# Patient Record
Sex: MALE | Race: WHITE | NOT HISPANIC OR LATINO | Employment: OTHER | URBAN - METROPOLITAN AREA
[De-identification: names, ages, dates, MRNs, and addresses within clinical notes are randomized per-mention and may not be internally consistent; named-entity substitution may affect disease eponyms.]

---

## 2017-05-23 ENCOUNTER — HOSPITAL ENCOUNTER (OUTPATIENT)
Dept: LAB | Facility: HOSPITAL | Age: 67
Setting detail: SPECIMEN
Discharge: HOME | End: 2017-05-23
Attending: INTERNAL MEDICINE | Admitting: INTERNAL MEDICINE

## 2017-11-20 ENCOUNTER — HOSPITAL ENCOUNTER (OUTPATIENT)
Dept: LAB | Facility: HOSPITAL | Age: 67
Setting detail: SPECIMEN
Discharge: HOME | End: 2017-11-20
Attending: INTERNAL MEDICINE | Admitting: INTERNAL MEDICINE

## 2017-11-20 LAB
ALBUMIN SERPL-MCNC: 3.9 G/DL (ref 3.5–4.8)
ALBUMIN/GLOB SERPL: 1.6 {RATIO} (ref 1–1.7)
ALP SERPL-CCNC: 97 IU/L (ref 32–91)
ALT SERPL-CCNC: 24 IU/L (ref 17–63)
ANION GAP SERPL CALC-SCNC: 12.7 MMOL/L (ref 10–20)
AST SERPL-CCNC: 17 IU/L (ref 15–41)
BILIRUB SERPL-MCNC: 0.7 MG/DL (ref 0.3–1.2)
BUN SERPL-MCNC: 19 MG/DL (ref 8–20)
BUN/CREAT SERPL: 23.8 (ref 6.2–20.3)
CALCIUM SERPL-MCNC: 9.5 MG/DL (ref 8.9–10.3)
CHLORIDE SERPL-SCNC: 101 MMOL/L (ref 101–111)
CHOLEST SERPL-MCNC: 125 MG/DL
CHOLEST/HDLC SERPL: 3.3 {RATIO}
CONV CO2: 27 MMOL/L (ref 22–32)
CONV LDL CHOLESTEROL DIRECT: 67 MG/DL (ref 0–100)
CONV MICROALBUM.,U,RANDOM: 161 MG/L
CONV TOTAL PROTEIN: 6.4 G/DL (ref 6.1–7.9)
CREAT 24H UR-MCNC: 94 MG/DL
CREAT UR-MCNC: 0.8 MG/DL (ref 0.7–1.2)
GLOBULIN UR ELPH-MCNC: 2.5 G/DL (ref 2.5–3.8)
GLUCOSE SERPL-MCNC: 260 MG/DL (ref 65–99)
HDLC SERPL-MCNC: 38 MG/DL
LDLC/HDLC SERPL: 1.8 {RATIO}
LIPID INTERPRETATION: ABNORMAL
MICROALBUMIN/CREAT UR: 171.3 UG/MG
POTASSIUM SERPL-SCNC: 4.7 MMOL/L (ref 3.6–5.1)
SODIUM SERPL-SCNC: 136 MMOL/L (ref 136–144)
TRIGL SERPL-MCNC: 179 MG/DL
TSH SERPL-ACNC: 2.07 UIU/ML (ref 0.34–5.6)
VLDLC SERPL CALC-MCNC: 20 MG/DL

## 2018-01-29 ENCOUNTER — OFFICE (OUTPATIENT)
Dept: URBAN - METROPOLITAN AREA PATHOLOGY 4 | Facility: PATHOLOGY | Age: 68
End: 2018-01-29

## 2018-01-29 ENCOUNTER — ON CAMPUS - OUTPATIENT (OUTPATIENT)
Dept: URBAN - METROPOLITAN AREA HOSPITAL 2 | Facility: HOSPITAL | Age: 68
End: 2018-01-29

## 2018-01-29 ENCOUNTER — HOSPITAL ENCOUNTER (OUTPATIENT)
Dept: OTHER | Facility: HOSPITAL | Age: 68
Setting detail: SPECIMEN
Discharge: HOME | End: 2018-01-29
Attending: INTERNAL MEDICINE | Admitting: INTERNAL MEDICINE

## 2018-01-29 VITALS
OXYGEN SATURATION: 91 % | HEIGHT: 72 IN | HEART RATE: 76 BPM | DIASTOLIC BLOOD PRESSURE: 63 MMHG | DIASTOLIC BLOOD PRESSURE: 51 MMHG | SYSTOLIC BLOOD PRESSURE: 109 MMHG | DIASTOLIC BLOOD PRESSURE: 43 MMHG | DIASTOLIC BLOOD PRESSURE: 58 MMHG | RESPIRATION RATE: 20 BRPM | SYSTOLIC BLOOD PRESSURE: 131 MMHG | HEART RATE: 80 BPM | DIASTOLIC BLOOD PRESSURE: 93 MMHG | DIASTOLIC BLOOD PRESSURE: 75 MMHG | TEMPERATURE: 97.2 F | HEART RATE: 81 BPM | RESPIRATION RATE: 16 BRPM | DIASTOLIC BLOOD PRESSURE: 69 MMHG | DIASTOLIC BLOOD PRESSURE: 85 MMHG | HEART RATE: 88 BPM | SYSTOLIC BLOOD PRESSURE: 186 MMHG | SYSTOLIC BLOOD PRESSURE: 110 MMHG | SYSTOLIC BLOOD PRESSURE: 127 MMHG | HEART RATE: 82 BPM | HEART RATE: 87 BPM | OXYGEN SATURATION: 95 % | OXYGEN SATURATION: 99 % | SYSTOLIC BLOOD PRESSURE: 150 MMHG | OXYGEN SATURATION: 97 % | SYSTOLIC BLOOD PRESSURE: 126 MMHG | HEART RATE: 84 BPM | WEIGHT: 275.6 LBS | OXYGEN SATURATION: 96 % | SYSTOLIC BLOOD PRESSURE: 104 MMHG | DIASTOLIC BLOOD PRESSURE: 82 MMHG | RESPIRATION RATE: 18 BRPM | HEART RATE: 83 BPM | OXYGEN SATURATION: 98 % | SYSTOLIC BLOOD PRESSURE: 119 MMHG

## 2018-01-29 DIAGNOSIS — K62.1 RECTAL POLYP: ICD-10-CM

## 2018-01-29 DIAGNOSIS — D12.5 BENIGN NEOPLASM OF SIGMOID COLON: ICD-10-CM

## 2018-01-29 DIAGNOSIS — D12.4 BENIGN NEOPLASM OF DESCENDING COLON: ICD-10-CM

## 2018-01-29 DIAGNOSIS — Z86.010 PERSONAL HISTORY OF COLONIC POLYPS: ICD-10-CM

## 2018-01-29 DIAGNOSIS — D17.79 BENIGN LIPOMATOUS NEOPLASM OF OTHER SITES: ICD-10-CM

## 2018-01-29 DIAGNOSIS — K57.30 DIVERTICULOSIS OF LARGE INTESTINE WITHOUT PERFORATION OR ABS: ICD-10-CM

## 2018-01-29 DIAGNOSIS — D12.2 BENIGN NEOPLASM OF ASCENDING COLON: ICD-10-CM

## 2018-01-29 DIAGNOSIS — K63.5 POLYP OF COLON: ICD-10-CM

## 2018-01-29 LAB
GI HISTOLOGY: A. UNSPECIFIED: (no result)
GI HISTOLOGY: B. UNSPECIFIED: (no result)
GI HISTOLOGY: C. UNSPECIFIED: (no result)
GI HISTOLOGY: D. UNSPECIFIED: (no result)
GI HISTOLOGY: PDF REPORT: (no result)

## 2018-01-29 PROCEDURE — 45385 COLONOSCOPY W/LESION REMOVAL: CPT | Mod: PT | Performed by: INTERNAL MEDICINE

## 2018-01-29 PROCEDURE — 45380 COLONOSCOPY AND BIOPSY: CPT | Mod: 59,PT | Performed by: INTERNAL MEDICINE

## 2018-01-29 PROCEDURE — 88305 TISSUE EXAM BY PATHOLOGIST: CPT | Mod: 26 | Performed by: INTERNAL MEDICINE

## 2018-01-29 RX ADMIN — PROPOFOL: 10 INJECTION, EMULSION INTRAVENOUS at 10:59

## 2018-06-04 ENCOUNTER — HOSPITAL ENCOUNTER (OUTPATIENT)
Dept: LAB | Facility: HOSPITAL | Age: 68
Setting detail: SPECIMEN
Discharge: HOME | End: 2018-06-04
Attending: INTERNAL MEDICINE | Admitting: INTERNAL MEDICINE

## 2019-01-09 ENCOUNTER — HOSPITAL ENCOUNTER (OUTPATIENT)
Dept: LAB | Facility: HOSPITAL | Age: 69
Setting detail: SPECIMEN
Discharge: HOME | End: 2019-01-09
Attending: INTERNAL MEDICINE | Admitting: INTERNAL MEDICINE

## 2019-01-09 LAB
ALBUMIN SERPL-MCNC: 3.5 G/DL (ref 3.5–4.8)
ALBUMIN/GLOB SERPL: 1.4 {RATIO} (ref 1–1.7)
ALP SERPL-CCNC: 104 IU/L (ref 32–91)
ALT SERPL-CCNC: 30 IU/L (ref 17–63)
ANION GAP SERPL CALC-SCNC: 13.1 MMOL/L (ref 10–20)
AST SERPL-CCNC: 21 IU/L (ref 15–41)
BILIRUB SERPL-MCNC: 0.3 MG/DL (ref 0.3–1.2)
BUN SERPL-MCNC: 14 MG/DL (ref 8–20)
BUN/CREAT SERPL: 17.5 (ref 6.2–20.3)
CALCIUM SERPL-MCNC: 8.8 MG/DL (ref 8.9–10.3)
CHLORIDE SERPL-SCNC: 103 MMOL/L (ref 101–111)
CHOLEST SERPL-MCNC: 101 MG/DL
CHOLEST/HDLC SERPL: 3.6 {RATIO}
CONV CO2: 26 MMOL/L (ref 22–32)
CONV LDL CHOLESTEROL DIRECT: 60 MG/DL (ref 0–100)
CONV MICROALBUM.,U,RANDOM: 353 MG/L
CONV TOTAL PROTEIN: 6 G/DL (ref 6.1–7.9)
CREAT 24H UR-MCNC: 103 MG/DL
CREAT UR-MCNC: 0.8 MG/DL (ref 0.7–1.2)
GLOBULIN UR ELPH-MCNC: 2.5 G/DL (ref 2.5–3.8)
GLUCOSE SERPL-MCNC: 113 MG/DL (ref 65–99)
HDLC SERPL-MCNC: 28 MG/DL
LDLC/HDLC SERPL: 2.2 {RATIO}
LIPID INTERPRETATION: ABNORMAL
MICROALBUMIN/CREAT UR: 342.7 UG/MG
POTASSIUM SERPL-SCNC: 4.1 MMOL/L (ref 3.6–5.1)
SODIUM SERPL-SCNC: 138 MMOL/L (ref 136–144)
TRIGL SERPL-MCNC: 129 MG/DL
VLDLC SERPL CALC-MCNC: 12.9 MG/DL

## 2019-07-03 DIAGNOSIS — E10.49 TYPE 1 DIABETES MELLITUS WITH OTHER DIABETIC NEUROLOGICAL COMPLICATION (HCC): Primary | ICD-10-CM

## 2019-07-03 DIAGNOSIS — E55.9 VITAMIN D DEFICIENCY: ICD-10-CM

## 2019-07-03 PROBLEM — Z80.1 FAMILY HISTORY OF MALIGNANT NEOPLASM OF TRACHEA, BRONCHUS AND LUNG: Status: ACTIVE | Noted: 2019-07-03

## 2019-07-16 ENCOUNTER — LAB (OUTPATIENT)
Dept: LAB | Facility: HOSPITAL | Age: 69
End: 2019-07-16

## 2019-07-16 DIAGNOSIS — E10.49 TYPE 1 DIABETES MELLITUS WITH OTHER DIABETIC NEUROLOGICAL COMPLICATION (HCC): ICD-10-CM

## 2019-07-16 DIAGNOSIS — E55.9 VITAMIN D DEFICIENCY: ICD-10-CM

## 2019-07-16 LAB
25(OH)D3 SERPL-MCNC: 30.4 NG/ML (ref 30–100)
HBA1C MFR BLD: 7.4 % (ref 3.5–5.6)

## 2019-07-16 PROCEDURE — 82306 VITAMIN D 25 HYDROXY: CPT | Performed by: NURSE PRACTITIONER

## 2019-07-16 PROCEDURE — 36415 COLL VENOUS BLD VENIPUNCTURE: CPT

## 2019-07-16 PROCEDURE — 83036 HEMOGLOBIN GLYCOSYLATED A1C: CPT | Performed by: NURSE PRACTITIONER

## 2019-07-23 ENCOUNTER — OFFICE VISIT (OUTPATIENT)
Dept: ENDOCRINOLOGY | Facility: CLINIC | Age: 69
End: 2019-07-23

## 2019-07-23 VITALS
BODY MASS INDEX: 36.84 KG/M2 | HEART RATE: 74 BPM | WEIGHT: 272 LBS | DIASTOLIC BLOOD PRESSURE: 78 MMHG | HEIGHT: 72 IN | SYSTOLIC BLOOD PRESSURE: 142 MMHG | OXYGEN SATURATION: 98 %

## 2019-07-23 DIAGNOSIS — E10.49 TYPE 1 DIABETES MELLITUS WITH OTHER DIABETIC NEUROLOGICAL COMPLICATION (HCC): Primary | ICD-10-CM

## 2019-07-23 DIAGNOSIS — I10 ESSENTIAL HYPERTENSION: ICD-10-CM

## 2019-07-23 DIAGNOSIS — E78.5 HYPERLIPIDEMIA, UNSPECIFIED HYPERLIPIDEMIA TYPE: ICD-10-CM

## 2019-07-23 DIAGNOSIS — E55.9 VITAMIN D DEFICIENCY: ICD-10-CM

## 2019-07-23 LAB — GLUCOSE BLDC GLUCOMTR-MCNC: 169 MG/DL (ref 70–130)

## 2019-07-23 PROCEDURE — 82962 GLUCOSE BLOOD TEST: CPT | Performed by: INTERNAL MEDICINE

## 2019-07-23 PROCEDURE — 99214 OFFICE O/P EST MOD 30 MIN: CPT | Performed by: INTERNAL MEDICINE

## 2019-07-23 RX ORDER — ASCORBIC ACID 500 MG
500 TABLET ORAL DAILY
COMMUNITY

## 2019-07-23 RX ORDER — BLOOD SUGAR DIAGNOSTIC
1 STRIP MISCELLANEOUS 3 TIMES DAILY
Refills: 11 | COMMUNITY
Start: 2019-05-31 | End: 2023-03-20

## 2019-07-23 RX ORDER — BIMATOPROST 0.01 %
1 DROPS OPHTHALMIC (EYE) NIGHTLY
Refills: 2 | COMMUNITY
Start: 2019-07-08

## 2019-07-23 RX ORDER — ATORVASTATIN CALCIUM 20 MG/1
20 TABLET, FILM COATED ORAL
Refills: 3 | COMMUNITY
Start: 2019-07-06 | End: 2023-03-20

## 2019-07-23 RX ORDER — ASPIRIN 81 MG/1
81 TABLET ORAL DAILY
COMMUNITY
Start: 2015-11-16 | End: 2023-03-20

## 2019-07-23 RX ORDER — LISINOPRIL 20 MG/1
20 TABLET ORAL DAILY
Refills: 1 | COMMUNITY
Start: 2019-05-13 | End: 2019-07-23 | Stop reason: DRUGHIGH

## 2019-07-23 RX ORDER — MV-MN/FOLIC ACID/LUTEIN/HRB178 200-175MCG
1 TABLET ORAL DAILY
COMMUNITY
Start: 2015-11-16

## 2019-07-23 RX ORDER — PEN NEEDLE, DIABETIC 31 GX5/16"
NEEDLE, DISPOSABLE MISCELLANEOUS
Refills: 5 | COMMUNITY
Start: 2019-06-26 | End: 2023-03-20

## 2019-07-23 RX ORDER — LANCETS
EACH MISCELLANEOUS 3 TIMES DAILY
Refills: 10 | COMMUNITY
Start: 2019-04-22 | End: 2023-03-20

## 2019-07-23 RX ORDER — INSULIN GLARGINE 300 U/ML
80 INJECTION, SOLUTION SUBCUTANEOUS
Refills: 2 | COMMUNITY
Start: 2019-06-10 | End: 2020-02-26

## 2019-07-23 RX ORDER — LISINOPRIL 40 MG/1
40 TABLET ORAL DAILY
Qty: 90 TABLET | Refills: 4 | Status: SHIPPED | OUTPATIENT
Start: 2019-07-23 | End: 2020-07-22

## 2019-07-23 RX ORDER — INSULIN LISPRO 100 [IU]/ML
26 INJECTION, SOLUTION INTRAVENOUS; SUBCUTANEOUS
Refills: 2 | COMMUNITY
Start: 2019-05-24 | End: 2020-02-07

## 2019-07-23 NOTE — PATIENT INSTRUCTIONS
Increase Humalog to 26 units before meals.  Call if blood sugars are running under 100 or over 200.  Increase lisinopril to 2 daily until finished.  Then take 40 mg daily.  Continue exercise.  F/u in 6 months, with fasting labs prior.

## 2019-07-29 NOTE — PROGRESS NOTES
[facility]        Patient Care Team:  Neel Gusman MD as PCP - General  Neel Gusman MD as PCP - Claims Attributed    Chief Complaint:    Chief Complaint   Patient presents with   • Diabetes     type 1          Subjective   Here for diabetes f/u  Blood sugars up & down in am  Exercise program: golf  Had L eye laser this month  Interval History:     Patient Complaints: none  Patient Denies:  Severe hypoglycemia  History taken from: patient    Review of Systems:   Review of Systems   Eyes: Negative for blurred vision.   Gastrointestinal: Negative for nausea.   Endocrine: Negative for polyuria.   Neurological: Negative for headache.     Lost  3 lb since last visit    Objective     Vital Signs      Vitals:    07/23/19 0742   BP: 142/78   Pulse: 74   SpO2: 98%         Physical Exam:     General Appearance:    Alert, cooperative, in no acute distress. Obese   Head:    Normocephalic, without obvious abnormality, atraumatic   Eyes:            Lids and lashes normal, conjunctivae and sclerae normal, no   icterus, no pallor, corneas clear, PERRLA   Throat:   No oral lesions,  oral mucosa moist. Dentures   Neck:   No adenopathy, supple,  no thyromegaly, no   carotid bruit   Lungs:     Clear / decreased breath sounds    Heart:    Regular rhythm and normal rate   Chest Wall:    No abnormalities observed   Abdomen:     Normal bowel sounds, soft, obese                 Extremities:   Moves all extremities well, no edema               Pulses:   Pulses palpable and equal bilaterally   Skin:   Dry. No bruising or rash   Neurologic:  DTR absent, able to feel the 10g monofilament          Results Review:    I have reviewed the patient's new clinical results, labs & imaging.    Medication Review:   Prior to Admission medications    Medication Sig Start Date End Date Taking? Authorizing Provider   ACCU-CHEK HOLLI PLUS test strip 3 (Three) Times a Day. for testing three times daily breakfast lunch  and supper dx:e11.65 5/31/19  Yes ProviderDez MD   ACCU-CHEK SOFTCLIX LANCETS lancets 3 (Three) Times a Day. Breakfast lunch and supper testing dx:e11.65 4/22/19  Yes Dez Ambrose MD   aspirin (ADULT ASPIRIN EC LOW STRENGTH) 81 MG EC tablet Take one daily 11/16/15  Yes Dez Ambrose MD   atorvastatin (LIPITOR) 20 MG tablet Take 20 mg by mouth every night at bedtime. 7/6/19  Yes Dez Ambrose MD B-D ULTRAFINE III SHORT PEN 31G X 8 MM misc Use for injections four times daily 6/26/19  Yes Dez Ambrose MD   HUMALOG KWIKPEN 100 UNIT/ML solution pen-injector Inject 26 units ac breakfast, lunch, and supper with a sliding scale, max daily dose 100. Dx:e11.65 5/24/19  Yes ProviderDez MD   LUMIGAN 0.01 % ophthalmic drops Instill 1 drop into both eyes daily at bedtime 7/8/19  Yes ProviderDez MD   metFORMIN (GLUCOPHAGE) 1000 MG tablet Take 1,000 mg by mouth 2 (Two) Times a Day. 6/25/19  Yes ProviderDez MD   Multiple Vitamins-Minerals (GWENDOLYN MULTIVITAMIN FOR MEN) tablet Take one daily 11/16/15  Yes ProviderDez MD TOUJEO SOLOSTAR 300 UNIT/ML solution pen-injector inject 80 units at bedtime dx:e11.65 6/10/19  Yes ProviderDez MD   vitamin C (ASCORBIC ACID) 250 MG tablet Take 250 mg by mouth Daily.   Yes ProviderDez MD   lisinopril (PRINIVIL,ZESTRIL) 40 MG tablet Take 1 tablet by mouth Daily. 7/23/19 7/22/20  Julian Hall MD       Lab Results (most recent)     Procedure Component Value Units Date/Time    POC Glucose Fingerstick [[id number]]  (Abnormal) Collected:  07/23/19 0749    Specimen:  Blood Updated:  07/23/19 0749     Glucose 169 mg/dL      Comment: ate@ 630am this morning, 1.5 hours ago, insulin@ 6am               Lab Results   Component Value Date    HGBA1C 7.4 (H) 07/16/2019      Lab Results   Component Value Date    GLUCOSE 113 (H) 01/09/2019    BUN 14 01/09/2019    CREATININE 0.8 01/09/2019    BCR 17.5  01/09/2019    K 4.1 01/09/2019    CO2 26 01/09/2019    CALCIUM 8.8 (L) 01/09/2019    ALBUMIN 3.5 01/09/2019    LABIL2 1.4 01/09/2019    AST 21 01/09/2019    ALT 30 01/09/2019    CHOL 101 01/09/2019    LDL 60 01/09/2019    HDL 28 (L) 01/09/2019    TRIG 129 01/09/2019     Lab Results   Component Value Date    TSH 2.07 11/20/2017    PMTH39TW 30.4 07/16/2019       Assessment/Plan     Gerry was seen today for diabetes.    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        Increase Humalog to 26 units before meals.  Call if blood sugars are running under 100 or over 200.  Increase lisinopril to 2 daily until finished.  Then take 40 mg daily.  Continue exercise.          Julian Hall MD  07/29/19  9:55 AM         no

## 2019-10-16 ENCOUNTER — APPOINTMENT (OUTPATIENT)
Dept: PREADMISSION TESTING | Facility: HOSPITAL | Age: 69
End: 2019-10-16

## 2019-10-16 VITALS
DIASTOLIC BLOOD PRESSURE: 85 MMHG | BODY MASS INDEX: 37.25 KG/M2 | HEIGHT: 72 IN | HEART RATE: 66 BPM | OXYGEN SATURATION: 97 % | SYSTOLIC BLOOD PRESSURE: 178 MMHG | WEIGHT: 275 LBS

## 2019-10-16 RX ORDER — CHOLECALCIFEROL (VITAMIN D3) 125 MCG
1 CAPSULE ORAL DAILY
COMMUNITY

## 2019-10-16 RX ORDER — TAMSULOSIN HYDROCHLORIDE 0.4 MG/1
2 CAPSULE ORAL
COMMUNITY

## 2019-10-21 ENCOUNTER — ANESTHESIA EVENT (OUTPATIENT)
Dept: PERIOP | Facility: HOSPITAL | Age: 69
End: 2019-10-21

## 2019-10-21 NOTE — H&P
Patient Care Team:  Neel Gusman MD as PCP - General  Neel Gusman MD as PCP - Claims Attributed    Chief complaint Nonclearing diabetic vitreous hemorrhage    HPI Decreased vision from diabetic vitreous hemorrhage OD    Review of Systems  Review of Systems   Constitution: Negative.   HENT: Negative.    Eyes: Negative.    Cardiovascular: Negative.    Respiratory: Negative.    Endocrine: Negative.    Skin: Negative.    Musculoskeletal: Negative.    Gastrointestinal: Negative.    Genitourinary: Negative.    Neurological: Negative.    Psychiatric/Behavioral: Negative.    Allergic/Immunologic: Negative.    All other systems reviewed and are negative.      Physical Exam  Physical Exam   Constitutional: He is oriented to person, place, and time.   HENT:   Head: Normocephalic.   Cardiovascular: Normal rate, regular rhythm and normal heart sounds.   Pulmonary/Chest: Effort normal and breath sounds normal.   Neurological: He is alert and oriented to person, place, and time. He has normal strength.       No Known Allergies    History  Past Medical History:   Diagnosis Date   • Diabetes mellitus (CMS/HCC)    • Hyperlipidemia    • Hypertension        Past Surgical History:   Procedure Laterality Date   • HERNIA REPAIR     • KNEE ARTHROSCOPY Left    • TONSILLECTOMY         Social History     Tobacco Use   • Smoking status: Former Smoker     Last attempt to quit: 2018     Years since quittin.8   Substance Use Topics   • Alcohol use: Yes     Comment: occasionally   • Drug use: Yes     Types: Marijuana       No family history on file.    Vital Signs  There were no vitals taken for this visit.         Assessment:  (Diabetic vitreous hemorrhage OD).     Plan:   (Vitrectomy and endolaser OD).       Howard S. Lazarus, MD  10/21/19  3:13 PM

## 2019-10-22 ENCOUNTER — ANESTHESIA (OUTPATIENT)
Dept: PERIOP | Facility: HOSPITAL | Age: 69
End: 2019-10-22

## 2019-10-22 ENCOUNTER — HOSPITAL ENCOUNTER (OUTPATIENT)
Facility: HOSPITAL | Age: 69
Setting detail: HOSPITAL OUTPATIENT SURGERY
Discharge: HOME | End: 2019-10-22
Attending: OPHTHALMOLOGY | Admitting: OPHTHALMOLOGY

## 2019-10-22 VITALS
DIASTOLIC BLOOD PRESSURE: 74 MMHG | HEIGHT: 72 IN | HEART RATE: 82 BPM | RESPIRATION RATE: 16 BRPM | TEMPERATURE: 97.9 F | WEIGHT: 269.4 LBS | BODY MASS INDEX: 36.49 KG/M2 | SYSTOLIC BLOOD PRESSURE: 170 MMHG | OXYGEN SATURATION: 99 %

## 2019-10-22 LAB
GLUCOSE BLDC GLUCOMTR-MCNC: 200 MG/DL (ref 70–105)
GLUCOSE BLDC GLUCOMTR-MCNC: 254 MG/DL (ref 70–105)

## 2019-10-22 PROCEDURE — 25010000002 HYALURONIDASE (HUMAN) 150 UNIT/ML SOLUTION 1 ML VIAL: Performed by: OPHTHALMOLOGY

## 2019-10-22 PROCEDURE — 25010000003 CEFAZOLIN PER 500 MG: Performed by: OPHTHALMOLOGY

## 2019-10-22 PROCEDURE — 25010000002 HYDRALAZINE PER 20 MG: Performed by: NURSE ANESTHETIST, CERTIFIED REGISTERED

## 2019-10-22 PROCEDURE — 25010000002 DEXAMETHASONE PER 1 MG: Performed by: OPHTHALMOLOGY

## 2019-10-22 PROCEDURE — 25010000002 PROPOFOL 10 MG/ML EMULSION: Performed by: NURSE ANESTHETIST, CERTIFIED REGISTERED

## 2019-10-22 PROCEDURE — 63710000001 INSULIN REGULAR HUMAN PER 5 UNITS: Performed by: NURSE ANESTHETIST, CERTIFIED REGISTERED

## 2019-10-22 PROCEDURE — 82962 GLUCOSE BLOOD TEST: CPT

## 2019-10-22 RX ORDER — PHENYLEPHRINE HCL 2.5 %
1 DROPS OPHTHALMIC (EYE)
Status: COMPLETED | OUTPATIENT
Start: 2019-10-22 | End: 2019-10-22

## 2019-10-22 RX ORDER — CIPROFLOXACIN HYDROCHLORIDE 3.5 MG/ML
1 SOLUTION/ DROPS TOPICAL
Status: COMPLETED | OUTPATIENT
Start: 2019-10-22 | End: 2019-10-22

## 2019-10-22 RX ORDER — PROMETHAZINE HYDROCHLORIDE 25 MG/ML
6.25 INJECTION, SOLUTION INTRAMUSCULAR; INTRAVENOUS ONCE AS NEEDED
Status: DISCONTINUED | OUTPATIENT
Start: 2019-10-22 | End: 2019-10-22 | Stop reason: HOSPADM

## 2019-10-22 RX ORDER — ATROPINE SULFATE 10 MG/ML
SOLUTION/ DROPS OPHTHALMIC AS NEEDED
Status: DISCONTINUED | OUTPATIENT
Start: 2019-10-22 | End: 2019-10-22 | Stop reason: HOSPADM

## 2019-10-22 RX ORDER — SODIUM CHLORIDE 0.9 % (FLUSH) 0.9 %
3 SYRINGE (ML) INJECTION EVERY 12 HOURS SCHEDULED
Status: DISCONTINUED | OUTPATIENT
Start: 2019-10-22 | End: 2019-10-22 | Stop reason: HOSPADM

## 2019-10-22 RX ORDER — SODIUM CHLORIDE, SODIUM LACTATE, POTASSIUM CHLORIDE, CALCIUM CHLORIDE 600; 310; 30; 20 MG/100ML; MG/100ML; MG/100ML; MG/100ML
9 INJECTION, SOLUTION INTRAVENOUS CONTINUOUS PRN
Status: DISCONTINUED | OUTPATIENT
Start: 2019-10-22 | End: 2019-10-22 | Stop reason: HOSPADM

## 2019-10-22 RX ORDER — DIPHENHYDRAMINE HYDROCHLORIDE 50 MG/ML
12.5 INJECTION INTRAMUSCULAR; INTRAVENOUS
Status: DISCONTINUED | OUTPATIENT
Start: 2019-10-22 | End: 2019-10-22 | Stop reason: HOSPADM

## 2019-10-22 RX ORDER — PROMETHAZINE HYDROCHLORIDE 25 MG/1
25 TABLET ORAL ONCE AS NEEDED
Status: DISCONTINUED | OUTPATIENT
Start: 2019-10-22 | End: 2019-10-22 | Stop reason: HOSPADM

## 2019-10-22 RX ORDER — IPRATROPIUM BROMIDE AND ALBUTEROL SULFATE 2.5; .5 MG/3ML; MG/3ML
3 SOLUTION RESPIRATORY (INHALATION) ONCE AS NEEDED
Status: DISCONTINUED | OUTPATIENT
Start: 2019-10-22 | End: 2019-10-22 | Stop reason: HOSPADM

## 2019-10-22 RX ORDER — ACETAMINOPHEN 650 MG/1
650 SUPPOSITORY RECTAL ONCE AS NEEDED
Status: DISCONTINUED | OUTPATIENT
Start: 2019-10-22 | End: 2019-10-22 | Stop reason: HOSPADM

## 2019-10-22 RX ORDER — LIDOCAINE HYDROCHLORIDE 20 MG/ML
INJECTION, SOLUTION EPIDURAL; INFILTRATION; INTRACAUDAL; PERINEURAL AS NEEDED
Status: DISCONTINUED | OUTPATIENT
Start: 2019-10-22 | End: 2019-10-22 | Stop reason: SURG

## 2019-10-22 RX ORDER — ONDANSETRON 2 MG/ML
4 INJECTION INTRAMUSCULAR; INTRAVENOUS ONCE AS NEEDED
Status: DISCONTINUED | OUTPATIENT
Start: 2019-10-22 | End: 2019-10-22 | Stop reason: HOSPADM

## 2019-10-22 RX ORDER — PHENYLEPHRINE HCL IN 0.9% NACL 0.5 MG/5ML
.5-3 SYRINGE (ML) INTRAVENOUS
Status: DISCONTINUED | OUTPATIENT
Start: 2019-10-22 | End: 2019-10-22 | Stop reason: HOSPADM

## 2019-10-22 RX ORDER — ACETAMINOPHEN 325 MG/1
650 TABLET ORAL ONCE AS NEEDED
Status: DISCONTINUED | OUTPATIENT
Start: 2019-10-22 | End: 2019-10-22 | Stop reason: HOSPADM

## 2019-10-22 RX ORDER — NEOMYCIN SULFATE, POLYMYXIN B SULFATE AND BACITRACIN ZINC 3.5; 10000; 4 MG/G; [USP'U]/G; [USP'U]/G
OINTMENT OPHTHALMIC AS NEEDED
Status: DISCONTINUED | OUTPATIENT
Start: 2019-10-22 | End: 2019-10-22 | Stop reason: HOSPADM

## 2019-10-22 RX ORDER — HYDRALAZINE HYDROCHLORIDE 20 MG/ML
INJECTION INTRAMUSCULAR; INTRAVENOUS AS NEEDED
Status: DISCONTINUED | OUTPATIENT
Start: 2019-10-22 | End: 2019-10-22 | Stop reason: SURG

## 2019-10-22 RX ORDER — PROPOFOL 10 MG/ML
VIAL (ML) INTRAVENOUS AS NEEDED
Status: DISCONTINUED | OUTPATIENT
Start: 2019-10-22 | End: 2019-10-22 | Stop reason: SURG

## 2019-10-22 RX ORDER — HYDRALAZINE HYDROCHLORIDE 20 MG/ML
5 INJECTION INTRAMUSCULAR; INTRAVENOUS
Status: DISCONTINUED | OUTPATIENT
Start: 2019-10-22 | End: 2019-10-22 | Stop reason: HOSPADM

## 2019-10-22 RX ORDER — SODIUM CHLORIDE 0.9 % (FLUSH) 0.9 %
3-10 SYRINGE (ML) INJECTION AS NEEDED
Status: DISCONTINUED | OUTPATIENT
Start: 2019-10-22 | End: 2019-10-22 | Stop reason: HOSPADM

## 2019-10-22 RX ORDER — HOMATROPINE HYDROBROMIDE OPHTHALMIC 50 MG/ML
1 SOLUTION OPHTHALMIC
Status: COMPLETED | OUTPATIENT
Start: 2019-10-22 | End: 2019-10-22

## 2019-10-22 RX ORDER — DEXAMETHASONE SODIUM PHOSPHATE 4 MG/ML
INJECTION, SOLUTION INTRA-ARTICULAR; INTRALESIONAL; INTRAMUSCULAR; INTRAVENOUS; SOFT TISSUE AS NEEDED
Status: DISCONTINUED | OUTPATIENT
Start: 2019-10-22 | End: 2019-10-22 | Stop reason: HOSPADM

## 2019-10-22 RX ORDER — SODIUM CHLORIDE, SODIUM LACTATE, POTASSIUM CHLORIDE, CALCIUM CHLORIDE 600; 310; 30; 20 MG/100ML; MG/100ML; MG/100ML; MG/100ML
20 INJECTION, SOLUTION INTRAVENOUS CONTINUOUS
Status: DISCONTINUED | OUTPATIENT
Start: 2019-10-22 | End: 2019-10-22 | Stop reason: HOSPADM

## 2019-10-22 RX ORDER — EPHEDRINE SULFATE 50 MG/ML
5 INJECTION, SOLUTION INTRAVENOUS ONCE AS NEEDED
Status: DISCONTINUED | OUTPATIENT
Start: 2019-10-22 | End: 2019-10-22 | Stop reason: HOSPADM

## 2019-10-22 RX ORDER — PROMETHAZINE HYDROCHLORIDE 25 MG/1
25 SUPPOSITORY RECTAL ONCE AS NEEDED
Status: DISCONTINUED | OUTPATIENT
Start: 2019-10-22 | End: 2019-10-22 | Stop reason: HOSPADM

## 2019-10-22 RX ORDER — BALANCED SALT SOLUTION 6.4; .75; .48; .3; 3.9; 1.7 MG/ML; MG/ML; MG/ML; MG/ML; MG/ML; MG/ML
SOLUTION OPHTHALMIC AS NEEDED
Status: DISCONTINUED | OUTPATIENT
Start: 2019-10-22 | End: 2019-10-22 | Stop reason: HOSPADM

## 2019-10-22 RX ORDER — LABETALOL HYDROCHLORIDE 5 MG/ML
5 INJECTION, SOLUTION INTRAVENOUS
Status: DISCONTINUED | OUTPATIENT
Start: 2019-10-22 | End: 2019-10-22 | Stop reason: HOSPADM

## 2019-10-22 RX ADMIN — CIPROFLOXACIN 1 DROP: 3 SOLUTION OPHTHALMIC at 13:51

## 2019-10-22 RX ADMIN — INSULIN HUMAN 8 UNITS: 100 INJECTION, SOLUTION PARENTERAL at 14:23

## 2019-10-22 RX ADMIN — PHENYLEPHRINE HYDROCHLORIDE 1 DROP: 25 SOLUTION/ DROPS OPHTHALMIC at 14:01

## 2019-10-22 RX ADMIN — PROPOFOL 100 MG: 10 INJECTION, EMULSION INTRAVENOUS at 14:29

## 2019-10-22 RX ADMIN — CIPROFLOXACIN 1 DROP: 3 SOLUTION OPHTHALMIC at 14:00

## 2019-10-22 RX ADMIN — CIPROFLOXACIN 1 DROP: 3 SOLUTION OPHTHALMIC at 13:45

## 2019-10-22 RX ADMIN — HOMATROPINE HYDROBROMIDE OPHTHALMIC 1 DROP: 50 SOLUTION OPHTHALMIC at 13:46

## 2019-10-22 RX ADMIN — HYDRALAZINE HYDROCHLORIDE 10 MG: 20 INJECTION INTRAMUSCULAR; INTRAVENOUS at 14:54

## 2019-10-22 RX ADMIN — PHENYLEPHRINE HYDROCHLORIDE 1 DROP: 25 SOLUTION/ DROPS OPHTHALMIC at 13:55

## 2019-10-22 RX ADMIN — PHENYLEPHRINE HYDROCHLORIDE 1 DROP: 25 SOLUTION/ DROPS OPHTHALMIC at 13:50

## 2019-10-22 RX ADMIN — LIDOCAINE HYDROCHLORIDE 50 MG: 20 INJECTION, SOLUTION EPIDURAL; INFILTRATION; INTRACAUDAL; PERINEURAL at 14:29

## 2019-10-22 RX ADMIN — HOMATROPINE HYDROBROMIDE OPHTHALMIC 1 DROP: 50 SOLUTION OPHTHALMIC at 13:53

## 2019-10-22 RX ADMIN — HOMATROPINE HYDROBROMIDE OPHTHALMIC 1 DROP: 50 SOLUTION OPHTHALMIC at 14:05

## 2019-10-22 RX ADMIN — SODIUM CHLORIDE, SODIUM LACTATE, POTASSIUM CHLORIDE, AND CALCIUM CHLORIDE 20 ML/HR: 600; 310; 30; 20 INJECTION, SOLUTION INTRAVENOUS at 13:55

## 2019-10-22 RX ADMIN — HYDRALAZINE HYDROCHLORIDE 10 MG: 20 INJECTION INTRAMUSCULAR; INTRAVENOUS at 14:40

## 2019-10-22 NOTE — ANESTHESIA POSTPROCEDURE EVALUATION
Patient: Gerry Torres    Procedure Summary     Date:  10/22/19 Room / Location:  [facility] OR 05 / [facility] MAIN OR    Anesthesia Start:  1421 Anesthesia Stop:  1500    Procedure:  25G VITRECTOMY-ENDOLASER (Right Eye) Diagnosis:  (Type 2 diabetes mellitus with proliferative diabetic retinopathy with macular edema, right eye; Vitreous hemorrhage, right eye)    Surgeon:  Lazarus, Howard S., MD Provider:  Harshil Wong MD    Anesthesia Type:  MAC ASA Status:  3          Anesthesia Type: MAC  Last vitals  BP   170/74 (10/22/19 1515)   Temp   97.9 °F (36.6 °C) (10/22/19 1515)   Pulse   82 (10/22/19 1515)   Resp   16 (10/22/19 1515)     SpO2   99 % (10/22/19 1515)     Post Anesthesia Care and Evaluation    Pain management: adequate  Airway patency: patent  Anesthetic complications: No anesthetic complications  PONV Status: none  Cardiovascular status: acceptable  Respiratory status: acceptable  Hydration status: acceptable

## 2019-10-22 NOTE — OP NOTE
VITRECTOMY PARS PLANA 25GA  Procedure Report    Patient Name:  Gerry Torres  YOB: 1950    Date of Surgery:  10/22/2019     Indications: Nonclearing diabetic vitreous hemorrhage    Pre-op Diagnosis:   Type 2 diabetes mellitus with proliferative diabetic retinopathy with macular edema, right eye; Vitreous hemorrhage, right eye       Post-Op Diagnosis Codes:  Nonclearing diabetic vitreous hemorrhage secondary to proliferative diabetic retinopathy of the right eye    Procedure/CPT® Codes:      Procedure(s):  25G VITRECTOMY-panretinal ENDOLASER    Staff:       Anesthesia: Monitored Anesthesia Care    Implants:    Nothing was implanted during the procedure    Complications: None    Description of Procedure: After obtaining informed consent, the patient was taken to the operating room where a peribulbar block was administered to the operative eye.  The patient was then prepped and draped in the customary manner.  Using a 25 gauge entry system, an infusion cannula was inserted inferotemporally 3.5 mm posterior to the limbus.  Additional cannulas were inserted supratemporally superonasally in a similar manner.  A DORC dual chandelier was inserted superiorly.  The eye was entered with a light pipe vitreous cutter.  The vitreous was trimmed to the periphery for 360° Peripheral vitreous was removed employing scleral depression for visualization.  Laser was then applied in a peripheral scatter pattern for 360 degrees.   The periphery was  examined with scleral depression and no peripheral breaks or tears were noted.. The cannulas were removed and the eye was left slightly soft to palpation.  Subconjunctival injections of Decadron and cefazolin were administered into the inferior fornix. A drop of atropine and triple antibiotic ointment were placed on the eye and it was covered with a patch and Keenan shield.  The patient was taken to the postoperative recovery area in stable condition.      Howard S. Lazarus, MD      Date: 10/22/2019  Time: 3:13 PM

## 2019-10-22 NOTE — ANESTHESIA PREPROCEDURE EVALUATION
Anesthesia Evaluation     Patient summary reviewed and Nursing notes reviewed   NPO Solid Status: > 8 hours  NPO Liquid Status: > 8 hours           Airway   Mallampati: II  TM distance: >3 FB  Neck ROM: full  No difficulty expected  Dental    (+) upper dentures and partials    Pulmonary    Cardiovascular     (+) hypertension, hyperlipidemia,       Neuro/Psych  GI/Hepatic/Renal/Endo    (+)   diabetes mellitus using insulin,     Musculoskeletal     Abdominal    Substance History      OB/GYN          Other                        Anesthesia Plan    ASA 3     MAC     intravenous induction   Anesthetic plan, all risks, benefits, and alternatives have been provided, discussed and informed consent has been obtained with: patient.    Plan discussed with CRNA.

## 2019-11-08 ENCOUNTER — INPATIENT HOSPITAL (OUTPATIENT)
Dept: URBAN - METROPOLITAN AREA HOSPITAL 76 | Facility: HOSPITAL | Age: 69
End: 2019-11-08

## 2019-11-08 DIAGNOSIS — F10.10 ALCOHOL ABUSE, UNCOMPLICATED: ICD-10-CM

## 2019-11-08 DIAGNOSIS — D72.829 ELEVATED WHITE BLOOD CELL COUNT, UNSPECIFIED: ICD-10-CM

## 2019-11-08 DIAGNOSIS — R10.13 EPIGASTRIC PAIN: ICD-10-CM

## 2019-11-08 DIAGNOSIS — K85.90 ACUTE PANCREATITIS WITHOUT NECROSIS OR INFECTION, UNSPECIFIE: ICD-10-CM

## 2019-11-08 DIAGNOSIS — R93.2 ABNORMAL FINDINGS ON DIAGNOSTIC IMAGING OF LIVER AND BILIARY: ICD-10-CM

## 2019-11-08 PROCEDURE — 99222 1ST HOSP IP/OBS MODERATE 55: CPT | Performed by: NURSE PRACTITIONER

## 2019-11-09 ENCOUNTER — INPATIENT HOSPITAL (OUTPATIENT)
Dept: URBAN - METROPOLITAN AREA HOSPITAL 76 | Facility: HOSPITAL | Age: 69
End: 2019-11-09

## 2019-11-09 DIAGNOSIS — E11.9 TYPE 2 DIABETES MELLITUS WITHOUT COMPLICATIONS: ICD-10-CM

## 2019-11-09 DIAGNOSIS — D72.829 ELEVATED WHITE BLOOD CELL COUNT, UNSPECIFIED: ICD-10-CM

## 2019-11-09 DIAGNOSIS — D64.89 OTHER SPECIFIED ANEMIAS: ICD-10-CM

## 2019-11-09 DIAGNOSIS — K85.90 ACUTE PANCREATITIS WITHOUT NECROSIS OR INFECTION, UNSPECIFIE: ICD-10-CM

## 2019-11-09 DIAGNOSIS — R93.2 ABNORMAL FINDINGS ON DIAGNOSTIC IMAGING OF LIVER AND BILIARY: ICD-10-CM

## 2019-11-09 DIAGNOSIS — F10.10 ALCOHOL ABUSE, UNCOMPLICATED: ICD-10-CM

## 2019-11-09 PROCEDURE — 99232 SBSQ HOSP IP/OBS MODERATE 35: CPT | Performed by: INTERNAL MEDICINE

## 2019-11-10 PROCEDURE — 99232 SBSQ HOSP IP/OBS MODERATE 35: CPT | Performed by: INTERNAL MEDICINE

## 2019-11-11 ENCOUNTER — INPATIENT HOSPITAL (OUTPATIENT)
Dept: URBAN - METROPOLITAN AREA HOSPITAL 76 | Facility: HOSPITAL | Age: 69
End: 2019-11-11

## 2019-11-11 DIAGNOSIS — K85.90 ACUTE PANCREATITIS WITHOUT NECROSIS OR INFECTION, UNSPECIFIE: ICD-10-CM

## 2019-11-11 DIAGNOSIS — D72.829 ELEVATED WHITE BLOOD CELL COUNT, UNSPECIFIED: ICD-10-CM

## 2019-11-11 PROCEDURE — 99232 SBSQ HOSP IP/OBS MODERATE 35: CPT | Performed by: NURSE PRACTITIONER

## 2019-11-12 PROCEDURE — 99232 SBSQ HOSP IP/OBS MODERATE 35: CPT | Performed by: NURSE PRACTITIONER

## 2019-12-09 ENCOUNTER — OFFICE (OUTPATIENT)
Dept: URBAN - METROPOLITAN AREA CLINIC 64 | Facility: CLINIC | Age: 69
End: 2019-12-09

## 2019-12-09 VITALS
DIASTOLIC BLOOD PRESSURE: 75 MMHG | WEIGHT: 268 LBS | HEART RATE: 71 BPM | HEIGHT: 72 IN | SYSTOLIC BLOOD PRESSURE: 149 MMHG

## 2019-12-09 DIAGNOSIS — I10 ESSENTIAL (PRIMARY) HYPERTENSION: ICD-10-CM

## 2019-12-09 DIAGNOSIS — R93.3 ABNORMAL FINDINGS ON DIAGNOSTIC IMAGING OF OTHER PARTS OF DI: ICD-10-CM

## 2019-12-09 DIAGNOSIS — K85.20 ALCOHOL INDUCED ACUTE PANCREATITIS WITHOUT NECROSIS OR INFEC: ICD-10-CM

## 2019-12-09 PROCEDURE — 99214 OFFICE O/P EST MOD 30 MIN: CPT | Performed by: INTERNAL MEDICINE

## 2019-12-18 ENCOUNTER — ON CAMPUS - OUTPATIENT (OUTPATIENT)
Dept: URBAN - METROPOLITAN AREA HOSPITAL 77 | Facility: HOSPITAL | Age: 69
End: 2019-12-18

## 2019-12-18 DIAGNOSIS — R93.3 ABNORMAL FINDINGS ON DIAGNOSTIC IMAGING OF OTHER PARTS OF DI: ICD-10-CM

## 2019-12-18 DIAGNOSIS — K85.20 ALCOHOL INDUCED ACUTE PANCREATITIS WITHOUT NECROSIS OR INFEC: ICD-10-CM

## 2019-12-18 DIAGNOSIS — K44.9 DIAPHRAGMATIC HERNIA WITHOUT OBSTRUCTION OR GANGRENE: ICD-10-CM

## 2019-12-18 DIAGNOSIS — K86.89 OTHER SPECIFIED DISEASES OF PANCREAS: ICD-10-CM

## 2019-12-18 PROCEDURE — 43238 EGD US FINE NEEDLE BX/ASPIR: CPT | Performed by: INTERNAL MEDICINE

## 2020-01-09 PROBLEM — Z83.3 FAMILY HISTORY OF DIABETES MELLITUS: Status: ACTIVE | Noted: 2020-01-09

## 2020-02-07 RX ORDER — INSULIN LISPRO 100 [IU]/ML
INJECTION, SOLUTION INTRAVENOUS; SUBCUTANEOUS
Qty: 90 ML | Refills: 3 | Status: SHIPPED | OUTPATIENT
Start: 2020-02-07 | End: 2020-02-07

## 2020-02-07 RX ORDER — INSULIN LISPRO 100 [IU]/ML
INJECTION, SOLUTION INTRAVENOUS; SUBCUTANEOUS
Qty: 90 ML | Refills: 3 | Status: SHIPPED | OUTPATIENT
Start: 2020-02-07 | End: 2023-03-20

## 2020-02-07 NOTE — TELEPHONE ENCOUNTER
Wife called concerned that pt had a severe low BG yesterday after lunch and went to the ED.  Wife states pt's appetite has been poor and not eating as much. Per MD s/o, instructed wife to lower pt's Humalog to 22 units ac if BG is >100 or 10 units if BG is <100.  Wife states pt is checking his BG's QID ac and hs but had not been writing them down since home from the hospital on 2/4/20.  Asked wife to have pt write BG's down going forward and call them in next week.  MD at [facility] told wife pt may benefit from an insulin pump.  Wife wants pt to talk with Dr. JOLLEY about a pump at March f/u appt.

## 2020-02-26 RX ORDER — INSULIN GLARGINE 300 U/ML
INJECTION, SOLUTION SUBCUTANEOUS
Qty: 30 ML | Refills: 2 | Status: SHIPPED | OUTPATIENT
Start: 2020-02-26 | End: 2023-03-20 | Stop reason: SDUPTHER

## 2021-02-02 ENCOUNTER — ON CAMPUS - OUTPATIENT (OUTPATIENT)
Dept: URBAN - METROPOLITAN AREA HOSPITAL 77 | Facility: HOSPITAL | Age: 71
End: 2021-02-02

## 2021-02-02 DIAGNOSIS — D12.3 BENIGN NEOPLASM OF TRANSVERSE COLON: ICD-10-CM

## 2021-02-02 DIAGNOSIS — Z86.010 PERSONAL HISTORY OF COLONIC POLYPS: ICD-10-CM

## 2021-02-02 PROCEDURE — 45385 COLONOSCOPY W/LESION REMOVAL: CPT | Mod: PT | Performed by: INTERNAL MEDICINE

## 2022-06-04 ENCOUNTER — HOSPITAL ENCOUNTER (OUTPATIENT)
Facility: HOSPITAL | Age: 72
Setting detail: OBSERVATION
Discharge: HOME | End: 2022-06-05
Attending: EMERGENCY MEDICINE | Admitting: EMERGENCY MEDICINE

## 2022-06-04 DIAGNOSIS — N18.9 ACUTE RENAL FAILURE SUPERIMPOSED ON CHRONIC KIDNEY DISEASE, UNSPECIFIED CKD STAGE, UNSPECIFIED ACUTE RENAL FAILURE TYPE: Primary | ICD-10-CM

## 2022-06-04 DIAGNOSIS — E87.5 HYPERKALEMIA: ICD-10-CM

## 2022-06-04 DIAGNOSIS — N17.9 ACUTE RENAL FAILURE SUPERIMPOSED ON CHRONIC KIDNEY DISEASE, UNSPECIFIED CKD STAGE, UNSPECIFIED ACUTE RENAL FAILURE TYPE: Primary | ICD-10-CM

## 2022-06-04 LAB
ANION GAP SERPL CALCULATED.3IONS-SCNC: 15 MMOL/L (ref 5–15)
BACTERIA UR QL AUTO: ABNORMAL /HPF
BASOPHILS # BLD AUTO: 0.1 10*3/MM3 (ref 0–0.2)
BASOPHILS NFR BLD AUTO: 0.6 % (ref 0–1.5)
BILIRUB UR QL STRIP: NEGATIVE
BUN SERPL-MCNC: 88 MG/DL (ref 8–23)
BUN/CREAT SERPL: 21.1 (ref 7–25)
CALCIUM SPEC-SCNC: 8.3 MG/DL (ref 8.6–10.5)
CHLORIDE SERPL-SCNC: 107 MMOL/L (ref 98–107)
CLARITY UR: CLEAR
CO2 SERPL-SCNC: 18 MMOL/L (ref 22–29)
COLOR UR: YELLOW
CREAT SERPL-MCNC: 4.17 MG/DL (ref 0.76–1.27)
DEPRECATED RDW RBC AUTO: 48.6 FL (ref 37–54)
EGFRCR SERPLBLD CKD-EPI 2021: 14.6 ML/MIN/1.73
EOSINOPHIL # BLD AUTO: 0.2 10*3/MM3 (ref 0–0.4)
EOSINOPHIL NFR BLD AUTO: 1.6 % (ref 0.3–6.2)
ERYTHROCYTE [DISTWIDTH] IN BLOOD BY AUTOMATED COUNT: 14 % (ref 12.3–15.4)
GLUCOSE BLDC GLUCOMTR-MCNC: 230 MG/DL (ref 70–105)
GLUCOSE SERPL-MCNC: 46 MG/DL (ref 65–99)
GLUCOSE UR STRIP-MCNC: NEGATIVE MG/DL
HCT VFR BLD AUTO: 36.7 % (ref 37.5–51)
HGB BLD-MCNC: 11.5 G/DL (ref 13–17.7)
HGB UR QL STRIP.AUTO: NEGATIVE
HYALINE CASTS UR QL AUTO: ABNORMAL /LPF
KETONES UR QL STRIP: NEGATIVE
LEUKOCYTE ESTERASE UR QL STRIP.AUTO: NEGATIVE
LYMPHOCYTES # BLD AUTO: 3.1 10*3/MM3 (ref 0.7–3.1)
LYMPHOCYTES NFR BLD AUTO: 24.3 % (ref 19.6–45.3)
MCH RBC QN AUTO: 30.8 PG (ref 26.6–33)
MCHC RBC AUTO-ENTMCNC: 31.5 G/DL (ref 31.5–35.7)
MCV RBC AUTO: 97.9 FL (ref 79–97)
MONOCYTES # BLD AUTO: 1.4 10*3/MM3 (ref 0.1–0.9)
MONOCYTES NFR BLD AUTO: 11 % (ref 5–12)
NEUTROPHILS NFR BLD AUTO: 62.5 % (ref 42.7–76)
NEUTROPHILS NFR BLD AUTO: 7.9 10*3/MM3 (ref 1.7–7)
NITRITE UR QL STRIP: NEGATIVE
NRBC BLD AUTO-RTO: 0.1 /100 WBC (ref 0–0.2)
PH UR STRIP.AUTO: <=5 [PH] (ref 5–8)
PLATELET # BLD AUTO: 197 10*3/MM3 (ref 140–450)
PMV BLD AUTO: 10.2 FL (ref 6–12)
POTASSIUM SERPL-SCNC: 5.4 MMOL/L (ref 3.5–5.2)
PROT UR QL STRIP: ABNORMAL
RBC # BLD AUTO: 3.75 10*6/MM3 (ref 4.14–5.8)
RBC # UR STRIP: ABNORMAL /HPF
REF LAB TEST METHOD: ABNORMAL
SARS-COV-2 RNA PNL SPEC NAA+PROBE: NOT DETECTED
SODIUM SERPL-SCNC: 140 MMOL/L (ref 136–145)
SP GR UR STRIP: 1.01 (ref 1–1.03)
SQUAMOUS #/AREA URNS HPF: ABNORMAL /HPF
UROBILINOGEN UR QL STRIP: ABNORMAL
WBC # UR STRIP: ABNORMAL /HPF
WBC NRBC COR # BLD: 12.6 10*3/MM3 (ref 3.4–10.8)

## 2022-06-04 PROCEDURE — G0378 HOSPITAL OBSERVATION PER HR: HCPCS

## 2022-06-04 PROCEDURE — 99284 EMERGENCY DEPT VISIT MOD MDM: CPT

## 2022-06-04 PROCEDURE — 82962 GLUCOSE BLOOD TEST: CPT

## 2022-06-04 PROCEDURE — 87635 SARS-COV-2 COVID-19 AMP PRB: CPT | Performed by: EMERGENCY MEDICINE

## 2022-06-04 PROCEDURE — 85025 COMPLETE CBC W/AUTO DIFF WBC: CPT | Performed by: NURSE PRACTITIONER

## 2022-06-04 PROCEDURE — 81001 URINALYSIS AUTO W/SCOPE: CPT | Performed by: EMERGENCY MEDICINE

## 2022-06-04 PROCEDURE — 96360 HYDRATION IV INFUSION INIT: CPT

## 2022-06-04 PROCEDURE — 80048 BASIC METABOLIC PNL TOTAL CA: CPT | Performed by: NURSE PRACTITIONER

## 2022-06-04 PROCEDURE — 99283 EMERGENCY DEPT VISIT LOW MDM: CPT

## 2022-06-04 PROCEDURE — 96361 HYDRATE IV INFUSION ADD-ON: CPT

## 2022-06-04 PROCEDURE — C9803 HOPD COVID-19 SPEC COLLECT: HCPCS

## 2022-06-04 RX ORDER — FUROSEMIDE 20 MG/1
40 TABLET ORAL DAILY
COMMUNITY
End: 2022-06-05 | Stop reason: HOSPADM

## 2022-06-04 RX ORDER — ATORVASTATIN CALCIUM 20 MG/1
20 TABLET, FILM COATED ORAL NIGHTLY
Status: DISCONTINUED | OUTPATIENT
Start: 2022-06-04 | End: 2022-06-05 | Stop reason: HOSPADM

## 2022-06-04 RX ORDER — TAMSULOSIN HYDROCHLORIDE 0.4 MG/1
0.8 CAPSULE ORAL DAILY
COMMUNITY
End: 2023-03-20 | Stop reason: SDUPTHER

## 2022-06-04 RX ORDER — TAMSULOSIN HYDROCHLORIDE 0.4 MG/1
0.8 CAPSULE ORAL DAILY
Status: DISCONTINUED | OUTPATIENT
Start: 2022-06-05 | End: 2022-06-05

## 2022-06-04 RX ORDER — CHOLECALCIFEROL (VITAMIN D3) 125 MCG
5 CAPSULE ORAL NIGHTLY PRN
Status: DISCONTINUED | OUTPATIENT
Start: 2022-06-04 | End: 2022-06-05 | Stop reason: HOSPADM

## 2022-06-04 RX ORDER — LISINOPRIL AND HYDROCHLOROTHIAZIDE 25; 20 MG/1; MG/1
1 TABLET ORAL DAILY
COMMUNITY
End: 2022-06-05 | Stop reason: HOSPADM

## 2022-06-04 RX ORDER — ASPIRIN 81 MG/1
81 TABLET ORAL DAILY
Status: DISCONTINUED | OUTPATIENT
Start: 2022-06-04 | End: 2022-06-05 | Stop reason: HOSPADM

## 2022-06-04 RX ORDER — MULTIPLE VITAMINS W/ MINERALS TAB 9MG-400MCG
1 TAB ORAL DAILY
Status: DISCONTINUED | OUTPATIENT
Start: 2022-06-04 | End: 2022-06-05 | Stop reason: HOSPADM

## 2022-06-04 RX ORDER — SODIUM CHLORIDE 0.9 % (FLUSH) 0.9 %
10 SYRINGE (ML) INJECTION EVERY 12 HOURS SCHEDULED
Status: DISCONTINUED | OUTPATIENT
Start: 2022-06-04 | End: 2022-06-05 | Stop reason: HOSPADM

## 2022-06-04 RX ORDER — SODIUM CHLORIDE 0.9 % (FLUSH) 0.9 %
10 SYRINGE (ML) INJECTION AS NEEDED
Status: DISCONTINUED | OUTPATIENT
Start: 2022-06-04 | End: 2022-06-05 | Stop reason: HOSPADM

## 2022-06-04 RX ORDER — AMLODIPINE BESYLATE 5 MG/1
10 TABLET ORAL DAILY
Status: DISCONTINUED | OUTPATIENT
Start: 2022-06-04 | End: 2022-06-05 | Stop reason: HOSPADM

## 2022-06-04 RX ORDER — SODIUM CHLORIDE 9 MG/ML
100 INJECTION, SOLUTION INTRAVENOUS CONTINUOUS
Status: DISCONTINUED | OUTPATIENT
Start: 2022-06-04 | End: 2022-06-05 | Stop reason: HOSPADM

## 2022-06-04 RX ORDER — ONDANSETRON 2 MG/ML
4 INJECTION INTRAMUSCULAR; INTRAVENOUS EVERY 6 HOURS PRN
Status: DISCONTINUED | OUTPATIENT
Start: 2022-06-04 | End: 2022-06-05 | Stop reason: HOSPADM

## 2022-06-04 RX ORDER — ACETAMINOPHEN 325 MG/1
650 TABLET ORAL EVERY 4 HOURS PRN
Status: DISCONTINUED | OUTPATIENT
Start: 2022-06-04 | End: 2022-06-05 | Stop reason: HOSPADM

## 2022-06-04 RX ORDER — INSULIN GLARGINE 300 U/ML
47-57 INJECTION, SOLUTION SUBCUTANEOUS DAILY
Status: ON HOLD | COMMUNITY
Start: 2022-05-02 | End: 2023-03-25 | Stop reason: SDUPTHER

## 2022-06-04 RX ORDER — ASCORBIC ACID 500 MG
1000 TABLET ORAL DAILY
Status: DISCONTINUED | OUTPATIENT
Start: 2022-06-05 | End: 2022-06-05 | Stop reason: HOSPADM

## 2022-06-04 RX ORDER — AMLODIPINE BESYLATE 10 MG/1
10 TABLET ORAL DAILY
COMMUNITY

## 2022-06-04 RX ADMIN — METOPROLOL TARTRATE 25 MG: 25 TABLET, FILM COATED ORAL at 21:16

## 2022-06-04 RX ADMIN — SODIUM CHLORIDE 100 ML/HR: 9 INJECTION, SOLUTION INTRAVENOUS at 21:16

## 2022-06-04 RX ADMIN — Medication 10 ML: at 21:17

## 2022-06-04 RX ADMIN — SODIUM ZIRCONIUM CYCLOSILICATE 10 G: 10 POWDER, FOR SUSPENSION ORAL at 18:50

## 2022-06-04 NOTE — ED PROVIDER NOTES
Subjective   Patient is a 71 year old white male with history of diabetes, HTN , HLD, chronic kidney disease.  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  He presents today under instruction of his oncologist for abnormal labs.  He states he had routine labs done there today and was told his kidney function was worsening and was sent to ED for Evaluation.  Patient's creatitine normally runs in the low 2s.  He denies any new medications or changes to his medications.  He denies any recent illness.  He denies fever, chills, cough, congestion, vomiting, diarrhea, chest pain, or soa.  He reports no urinary symptoms and states he has been urinating without difficulty.  He states he is not on chemo or radiation at this time.          Review of Systems   Constitutional: Negative.    HENT: Negative.    Eyes: Negative.    Respiratory: Negative.    Cardiovascular: Negative.    Gastrointestinal: Negative.    Genitourinary: Negative.    Musculoskeletal: Negative.    Skin: Negative.    Neurological: Negative.        Past Medical History:   Diagnosis Date   • Diabetes mellitus (CMS/HCC)    • Hyperlipidemia    • Hypertension    • Sleep apnea        No Known Allergies    Past Surgical History:   Procedure Laterality Date   • HERNIA REPAIR  1962   • KNEE ARTHROSCOPY Left 1979   • TONSILLECTOMY  1974   • VITRECTOMY PARS PLANA Right 10/22/2019    Procedure: 25G VITRECTOMY-ENDOLASER;  Surgeon: Lazarus, Howard S., MD;  Location: [facility] OR;  Service: Ophthalmology       No family history on file.    Social History     Socioeconomic History   • Marital status:    Tobacco Use   • Smoking status: Former Smoker     Quit date: 12/2018     Years since quitting: 3.5   Substance and Sexual Activity   • Alcohol use: Yes     Alcohol/week: 4.0 standard drinks     Types: 4 Cans of beer per week   • Drug use: Yes     Frequency: 5.0 times per week      Types: Marijuana   • Sexual activity: Defer           Objective   Physical Exam  Vital signs and triage nurse note reviewed.  Constitutional: Awake, alert; well-developed and well-nourished. No acute distress is noted.  HEENT: Normocephalic, atraumatic; pupils are PERRL with intact EOM; oropharynx is pink and moist without exudate or erythema.  No drooling or pooling of oral secretions.  Neck: Supple, full range of motion without pain; no cervical lymphadenopathy. Normal phonation.  Cardiovascular: Regular rate and rhythm, normal S1-S2.  No murmur noted.  Pulmonary: Respiratory effort regular nonlabored, breath sounds clear to auscultation all fields.  Abdomen: Soft, nontender, nondistended with normoactive bowel sounds; no rebound or guarding.  Musculoskeletal: Independent range of motion of all extremities with no palpable tenderness or edema.  Neuro: Alert oriented x3, speech is clear and appropriate, GCS 15.    Skin: Flesh tone, warm, dry, intact; no erythematous or petechial rash or lesion.      Procedures           ED Course      Labs Reviewed   BASIC METABOLIC PANEL - Abnormal; Notable for the following components:       Result Value    Glucose 46 (*)     BUN 88 (*)     Creatinine 4.17 (*)     Potassium 5.4 (*)     CO2 18.0 (*)     Calcium 8.3 (*)     eGFR 14.6 (*)     All other components within normal limits    Narrative:     GFR Normal >60  Chronic Kidney Disease <60  Kidney Failure <15     CBC WITH AUTO DIFFERENTIAL - Abnormal; Notable for the following components:    WBC 12.60 (*)     RBC 3.75 (*)     Hemoglobin 11.5 (*)     Hematocrit 36.7 (*)     MCV 97.9 (*)     Neutrophils, Absolute 7.90 (*)     Monocytes, Absolute 1.40 (*)     All other components within normal limits   URINALYSIS W/ MICROSCOPIC IF INDICATED (NO CULTURE)   CBC AND DIFFERENTIAL    Narrative:     The following orders were created for panel order CBC & Differential.  Procedure                               Abnormality          Status                     ---------                               -----------         ------                     CBC Auto Differential[[id number]]        Abnormal            Final result                 Please view results for these tests on the individual orders.     No radiology results for the last day  Medications   sodium zirconium cyclosilicate (LOKELMA) pack 10 g (has no administration in time range)   sodium chloride 0.9 % flush 10 mL (has no administration in time range)   sodium chloride 0.9 % flush 10 mL (has no administration in time range)   acetaminophen (TYLENOL) tablet 650 mg (has no administration in time range)   ondansetron (ZOFRAN) injection 4 mg (has no administration in time range)   melatonin tablet 5 mg (has no administration in time range)   sodium chloride 0.9 % infusion (has no administration in time range)                                                MDM  Number of Diagnoses or Management Options  Acute renal failure superimposed on chronic kidney disease, unspecified CKD stage, unspecified acute renal failure type (HCC)  Hyperkalemia  Diagnosis management comments: Patient had an IV established.  He had labs obtained.      Workup:  CBC significant for WBC 12.6, no bands.  Metabolic panel significant for glucose 46, BUN 88, Creat 4.17, Potassium 5.4, CO2 18.    Patient was discussed with Dr. Tucker, on call nephrologist for Dr. Alaniz, who would like the patient to have a dose of Lokelma, hold his lisinopril/hctz and follow up labs in 3-4 days outpatient.  Case was also discussed with my attending, Dr. Ash, who would like the patient placed in observation for further management.      Patient given Lokelma.  He will be placed in observation unit, hydrated overnight, with repeat labs in the morning and inpatient nephrology consult.    Patient otherwise well appearing, in no distress, hemodynamically stable.  He was given food and orange juice for his hypoglycemia.  He has no complaints  at this time.      Diagnosis and treatment plan discussed with patient.  Patient agreeable to plan.          Amount and/or Complexity of Data Reviewed  Clinical lab tests: ordered and reviewed    Patient Progress  Patient progress: stable      Final diagnoses:   Acute renal failure superimposed on chronic kidney disease, unspecified CKD stage, unspecified acute renal failure type (HCC)   Hyperkalemia       ED Disposition  ED Disposition     ED Disposition   Decision to Admit    Condition   --    Comment   --             No follow-up provider specified.       Medication List      No changes were made to your prescriptions during this visit.          Delphine Muñoz, CURT  06/04/22 2620

## 2022-06-05 VITALS
RESPIRATION RATE: 14 BRPM | TEMPERATURE: 98.2 F | DIASTOLIC BLOOD PRESSURE: 78 MMHG | WEIGHT: 264.77 LBS | SYSTOLIC BLOOD PRESSURE: 158 MMHG | OXYGEN SATURATION: 96 % | HEIGHT: 72 IN | BODY MASS INDEX: 35.86 KG/M2 | HEART RATE: 60 BPM

## 2022-06-05 LAB
ANION GAP SERPL CALCULATED.3IONS-SCNC: 14 MMOL/L (ref 5–15)
BASOPHILS # BLD AUTO: 0.1 10*3/MM3 (ref 0–0.2)
BASOPHILS NFR BLD AUTO: 0.9 % (ref 0–1.5)
BUN SERPL-MCNC: 90 MG/DL (ref 8–23)
BUN/CREAT SERPL: 22.6 (ref 7–25)
CALCIUM SPEC-SCNC: 8.2 MG/DL (ref 8.6–10.5)
CHLORIDE SERPL-SCNC: 105 MMOL/L (ref 98–107)
CO2 SERPL-SCNC: 21 MMOL/L (ref 22–29)
CREAT SERPL-MCNC: 3.99 MG/DL (ref 0.76–1.27)
DEPRECATED RDW RBC AUTO: 47.7 FL (ref 37–54)
EGFRCR SERPLBLD CKD-EPI 2021: 15.4 ML/MIN/1.73
EOSINOPHIL # BLD AUTO: 0.3 10*3/MM3 (ref 0–0.4)
EOSINOPHIL NFR BLD AUTO: 2.2 % (ref 0.3–6.2)
ERYTHROCYTE [DISTWIDTH] IN BLOOD BY AUTOMATED COUNT: 13.9 % (ref 12.3–15.4)
GLUCOSE BLDC GLUCOMTR-MCNC: 111 MG/DL (ref 70–105)
GLUCOSE BLDC GLUCOMTR-MCNC: 171 MG/DL (ref 70–105)
GLUCOSE BLDC GLUCOMTR-MCNC: 25 MG/DL (ref 70–105)
GLUCOSE BLDC GLUCOMTR-MCNC: 38 MG/DL (ref 70–105)
GLUCOSE BLDC GLUCOMTR-MCNC: 83 MG/DL (ref 70–105)
GLUCOSE SERPL-MCNC: 235 MG/DL (ref 65–99)
HCT VFR BLD AUTO: 34.8 % (ref 37.5–51)
HGB BLD-MCNC: 11.1 G/DL (ref 13–17.7)
LYMPHOCYTES # BLD AUTO: 2 10*3/MM3 (ref 0.7–3.1)
LYMPHOCYTES NFR BLD AUTO: 17.2 % (ref 19.6–45.3)
MCH RBC QN AUTO: 30.9 PG (ref 26.6–33)
MCHC RBC AUTO-ENTMCNC: 31.8 G/DL (ref 31.5–35.7)
MCV RBC AUTO: 97.2 FL (ref 79–97)
MONOCYTES # BLD AUTO: 1.4 10*3/MM3 (ref 0.1–0.9)
MONOCYTES NFR BLD AUTO: 11.6 % (ref 5–12)
NEUTROPHILS NFR BLD AUTO: 68.1 % (ref 42.7–76)
NEUTROPHILS NFR BLD AUTO: 8 10*3/MM3 (ref 1.7–7)
NRBC BLD AUTO-RTO: 0.1 /100 WBC (ref 0–0.2)
PLATELET # BLD AUTO: 198 10*3/MM3 (ref 140–450)
PMV BLD AUTO: 10.8 FL (ref 6–12)
POTASSIUM SERPL-SCNC: 5.1 MMOL/L (ref 3.5–5.2)
RBC # BLD AUTO: 3.58 10*6/MM3 (ref 4.14–5.8)
SODIUM SERPL-SCNC: 140 MMOL/L (ref 136–145)
WBC NRBC COR # BLD: 11.8 10*3/MM3 (ref 3.4–10.8)

## 2022-06-05 PROCEDURE — G0378 HOSPITAL OBSERVATION PER HR: HCPCS

## 2022-06-05 PROCEDURE — 82962 GLUCOSE BLOOD TEST: CPT

## 2022-06-05 PROCEDURE — 85025 COMPLETE CBC W/AUTO DIFF WBC: CPT | Performed by: NURSE PRACTITIONER

## 2022-06-05 PROCEDURE — 36415 COLL VENOUS BLD VENIPUNCTURE: CPT | Performed by: NURSE PRACTITIONER

## 2022-06-05 PROCEDURE — 80048 BASIC METABOLIC PNL TOTAL CA: CPT | Performed by: NURSE PRACTITIONER

## 2022-06-05 PROCEDURE — 63710000001 INSULIN LISPRO (HUMAN) PER 5 UNITS: Performed by: PHYSICIAN ASSISTANT

## 2022-06-05 PROCEDURE — 96361 HYDRATE IV INFUSION ADD-ON: CPT

## 2022-06-05 RX ORDER — TAMSULOSIN HYDROCHLORIDE 0.4 MG/1
0.8 CAPSULE ORAL DAILY
Status: DISCONTINUED | OUTPATIENT
Start: 2022-06-05 | End: 2022-06-05 | Stop reason: HOSPADM

## 2022-06-05 RX ORDER — INSULIN LISPRO 100 [IU]/ML
16 INJECTION, SOLUTION INTRAVENOUS; SUBCUTANEOUS
Status: DISCONTINUED | OUTPATIENT
Start: 2022-06-05 | End: 2022-06-05 | Stop reason: HOSPADM

## 2022-06-05 RX ORDER — OLANZAPINE 10 MG/2ML
1 INJECTION, POWDER, LYOPHILIZED, FOR SOLUTION INTRAMUSCULAR
Status: DISCONTINUED | OUTPATIENT
Start: 2022-06-05 | End: 2022-06-05 | Stop reason: HOSPADM

## 2022-06-05 RX ORDER — INSULIN LISPRO 100 [IU]/ML
22 INJECTION, SOLUTION INTRAVENOUS; SUBCUTANEOUS
Status: DISCONTINUED | OUTPATIENT
Start: 2022-06-05 | End: 2022-06-05

## 2022-06-05 RX ORDER — INSULIN LISPRO 100 [IU]/ML
0-9 INJECTION, SOLUTION INTRAVENOUS; SUBCUTANEOUS
Status: DISCONTINUED | OUTPATIENT
Start: 2022-06-05 | End: 2022-06-05 | Stop reason: HOSPADM

## 2022-06-05 RX ORDER — NICOTINE POLACRILEX 4 MG
15 LOZENGE BUCCAL
Status: DISCONTINUED | OUTPATIENT
Start: 2022-06-05 | End: 2022-06-05 | Stop reason: HOSPADM

## 2022-06-05 RX ORDER — INSULIN LISPRO 100 [IU]/ML
0-9 INJECTION, SOLUTION INTRAVENOUS; SUBCUTANEOUS AS NEEDED
Status: DISCONTINUED | OUTPATIENT
Start: 2022-06-05 | End: 2022-06-05 | Stop reason: HOSPADM

## 2022-06-05 RX ORDER — DEXTROSE MONOHYDRATE 25 G/50ML
25 INJECTION, SOLUTION INTRAVENOUS
Status: DISCONTINUED | OUTPATIENT
Start: 2022-06-05 | End: 2022-06-05 | Stop reason: HOSPADM

## 2022-06-05 RX ADMIN — OXYCODONE HYDROCHLORIDE AND ACETAMINOPHEN 1000 MG: 500 TABLET ORAL at 08:36

## 2022-06-05 RX ADMIN — INSULIN LISPRO 2 UNITS: 100 INJECTION, SOLUTION INTRAVENOUS; SUBCUTANEOUS at 08:37

## 2022-06-05 RX ADMIN — TAMSULOSIN HYDROCHLORIDE 0.8 MG: 0.4 CAPSULE ORAL at 08:36

## 2022-06-05 RX ADMIN — METOPROLOL TARTRATE 25 MG: 25 TABLET, FILM COATED ORAL at 08:36

## 2022-06-05 RX ADMIN — DEXTROSE 15 G: 15 GEL ORAL at 11:34

## 2022-06-05 RX ADMIN — Medication 10 ML: at 08:37

## 2022-06-05 RX ADMIN — INSULIN LISPRO 16 UNITS: 100 INJECTION, SOLUTION INTRAVENOUS; SUBCUTANEOUS at 08:37

## 2022-06-05 RX ADMIN — AMLODIPINE BESYLATE 10 MG: 5 TABLET ORAL at 08:36

## 2022-06-05 RX ADMIN — MULTIPLE VITAMINS W/ MINERALS TAB 1 TABLET: TAB at 08:36

## 2022-06-05 RX ADMIN — SODIUM CHLORIDE 100 ML/HR: 9 INJECTION, SOLUTION INTRAVENOUS at 06:23

## 2022-06-05 NOTE — PLAN OF CARE
Problem: Adult Inpatient Plan of Care  Goal: Plan of Care Review  Outcome: Ongoing, Progressing  Goal: Patient-Specific Goal (Individualized)  Outcome: Ongoing, Progressing  Goal: Absence of Hospital-Acquired Illness or Injury  Outcome: Ongoing, Progressing  Intervention: Identify and Manage Fall Risk  Description: Perform standard risk assessment on admission using a validated tool or comprehensive approach appropriate to the patient; reassess fall risk frequently, with change in status or transfer to another level of care.  Communicate fall injury risk to interprofessional healthcare team.  Determine need for increased observation, equipment and environmental modification, such as low bed, signage and supportive, nonskid footwear.  Adjust safety measures to individual developmental age, stage and identified risk factors.  Reinforce the importance of safety and physical activity with patient and family.  Perform regular intentional rounding to assess need for position change, pain assessment and personal needs, including assistance with toileting.  Recent Flowsheet Documentation  Taken 6/5/2022 1300 by Sneha Rhodes RN  Safety Promotion/Fall Prevention:   assistive device/personal items within reach   clutter free environment maintained   safety round/check completed   room organization consistent  Taken 6/5/2022 1115 by Sneha Rhodes, RN  Safety Promotion/Fall Prevention:   assistive device/personal items within reach   clutter free environment maintained   safety round/check completed   room organization consistent  Intervention: Prevent Skin Injury  Description: Perform a screening for skin injury risk, such as pressure or moisture associated skin damage on admission and at regular intervals throughout hospital stay.  Keep all areas of skin (especially folds) clean and dry.  Maintain adequate skin hydration.  Relieve and redistribute pressure and protect bony prominences; implement measures based on  patient-specific risk factors.  Match turning and repositioning schedule to clinical condition.  Encourage weight shift frequently; assist with reposition if unable to complete independently.  Float heels off bed; avoid pressure on the Achilles tendon.  Keep skin free from extended contact with medical devices.  Encourage functional activity and mobility, as early as tolerated.  Use aids (e.g., slide boards, mechanical lift) during transfer.  Recent Flowsheet Documentation  Taken 6/5/2022 1300 by Sneha Rhodes RN  Body Position:   supine   position changed independently  Taken 6/5/2022 1115 by Sneha Rhodes RN  Body Position:   supine   position changed independently  Intervention: Prevent and Manage VTE (Venous Thromboembolism) Risk  Description: Assess for VTE (venous thromboembolism) risk.  Encourage and assist with early ambulation.  Initiate and maintain compression or other therapy, as indicated, based on identified risk in accordance with organizational protocol and provider order.  Encourage both active and passive leg exercises while in bed, if unable to ambulate.  Recent Flowsheet Documentation  Taken 6/5/2022 1300 by Sneha Rhodes RN  Activity Management:   up ad april   activity encouraged  Taken 6/5/2022 1115 by Sneha Rhodes RN  Activity Management: activity encouraged  Intervention: Prevent Infection  Description: Maintain skin and mucous membrane integrity; promote hand, oral and pulmonary hygiene.  Optimize fluid balance, nutrition, sleep and glycemic control to maximize infection resistance.  Identify potential sources of infection early to prevent or mitigate progression of infection (e.g., wound, lines, devices).  Evaluate ongoing need for invasive devices; remove promptly when no longer indicated.  Recent Flowsheet Documentation  Taken 6/5/2022 1300 by Sneha Rhodes RN  Infection Prevention:   single patient room provided   rest/sleep promoted  Taken 6/5/2022 1115 by Sneha Rhodes  RN  Infection Prevention:   single patient room provided   rest/sleep promoted  Goal: Optimal Comfort and Wellbeing  Outcome: Ongoing, Progressing  Intervention: Monitor Pain and Promote Comfort  Description: Assess pain level, treatment efficacy and patient response at regular intervals using a consistent pain scale.  Consider the presence and impact of preexisting chronic pain.  Encourage patient and caregiver involvement in pain assessment, interventions and safety measures.  Recent Flowsheet Documentation  Taken 6/5/2022 1115 by Sneha Rhodes RN  Pain Management Interventions: quiet environment facilitated  Intervention: Provide Person-Centered Care  Description: Use a family-focused approach to care.  Develop trust and rapport by proactively providing information, encouraging questions, addressing concerns and offering reassurance.  Acknowledge emotional response to hospitalization.  Recognize and utilize personal coping strategies.  Honor spiritual and cultural preferences.  Recent Flowsheet Documentation  Taken 6/5/2022 1115 by Sneha Rhodes RN  Trust Relationship/Rapport:   thoughts/feelings acknowledged   choices provided   care explained   empathic listening provided   emotional support provided   questions answered   questions encouraged   reassurance provided  Goal: Readiness for Transition of Care  Outcome: Ongoing, Progressing   Goal Outcome Evaluation:      Patient awaiting nephrology.

## 2022-06-05 NOTE — NURSING NOTE
BS 25, Patient alert, shaky and reports feeling that he can tell his blood sugar is low,  two 4 oz orange juices given and patient eating lunch tray. Repeat BS in 15 min, reading 38. Glucose gel given, will repeat BS in 15 min per protocol. Sean MCCOY.

## 2022-06-05 NOTE — CONSULTS
Name: Gerry Torres  Age: 71 y.o.  : 1950  Sex: male    23    Reason for Consultation:  Acute kidney injury    Chief Complaint:  Patient had blood work done which revealed worsening renal failure was advised to go to the emergency room.    History of Present Illness :  Patient is 71-year-old male with chronic kidney disease stage III followed by Dr. Alaniz.  He also has history of diabetes hypertension obesity obstructive sleep apnea.     Patient has history of pancreatic cancer for which he is undergoing chemotherapy.  He received as chemotherapy injection 3 to 4 days back.  Subsequently had blood work done which revealed worsening of renal function along withHyperkalemia.    Patient had a BUN of 88 creatinine was 4.1.  Serum potassium was 5.4.  Patient's Eesinet and diuretics were discontinued he was started on IV fluids.  His creatinine is down to 3.9 BUN is 19.    Review of Systems    Review of Systems   Constitutional: Negative.  Negative for appetite change, chills, diaphoresis, fatigue and fever.   HENT: Negative.  Negative for congestion, dental problem, ear discharge, facial swelling, mouth sores, nosebleeds, postnasal drip, sinus pressure, tinnitus and trouble swallowing.    Eyes: Negative.  Negative for pain, discharge, redness, itching and visual disturbance.   Respiratory: Negative.  Negative for cough, chest tightness, shortness of breath and wheezing.    Cardiovascular: Negative.  Negative for chest pain, palpitations and leg swelling.   Gastrointestinal: Negative.  Negative for abdominal distention, abdominal pain, blood in stool, constipation, diarrhea, nausea and vomiting.   Endocrine: Negative.  Negative for polydipsia, polyphagia and polyuria.   Genitourinary: Negative.  Negative for decreased urine volume, difficulty urinating, dysuria, flank pain, frequency, genital sores, hematuria, penile discharge, penile swelling, scrotal swelling, testicular pain and urgency.    Musculoskeletal: Negative.  Negative for arthralgias, back pain, joint swelling, myalgias, neck pain and neck stiffness.   Skin: Negative.  Negative for color change, pallor, rash and wound.   Allergic/Immunologic: Negative.    Neurological: Negative.  Negative for dizziness, tremors, seizures, syncope, speech difficulty, weakness, light-headedness, numbness and headaches.   Hematological: Negative.  Negative for adenopathy. Does not bruise/bleed easily.   Psychiatric/Behavioral: Negative.  Negative for agitation, behavioral problems, confusion, hallucinations and suicidal ideas. The patient is not nervous/anxious.      .ro    Past Medical History    Past Medical History:   Diagnosis Date   • Diabetes mellitus (CMS/HCC)    • Hyperlipidemia    • Hypertension    • Sleep apnea        Past Surgical History    Past Surgical History:   Procedure Laterality Date   • HERNIA REPAIR  1962   • KNEE ARTHROSCOPY Left 1979   • TONSILLECTOMY  1974   • VITRECTOMY PARS PLANA Right 10/22/2019    Procedure: 25G VITRECTOMY-ENDOLASER;  Surgeon: Lazarus, Howard S., MD;  Location: [facility] MAIN OR;  Service: Ophthalmology         Family History  No family history on file.    Objective:    Vital Signs  Temp:  [97.4 °F (36.3 °C)-98.5 °F (36.9 °C)] 97.6 °F (36.4 °C)  Heart Rate:  [55-71] 55  Resp:  [16-17] 17  BP: (125-181)/(58-80) 158/79        Intake/Output Summary (Last 24 hours) at 6/5/2022 1421  Last data filed at 6/5/2022 1320  Gross per 24 hour   Intake 240 ml   Output 800 ml   Net -560 ml             Physical Exam     Physical Exam  Constitutional:       General: He is not in acute distress.     Appearance: He is not diaphoretic.   HENT:      Head: Normocephalic and atraumatic.      Nose: Nose normal.   Eyes:      Pupils: Pupils are equal, round, and reactive to light.   Neck:      Thyroid: No thyromegaly.      Vascular: No JVD.      Trachea: No tracheal deviation.   Cardiovascular:      Rate and Rhythm: Normal rate and regular  rhythm.      Heart sounds: No murmur heard.    No friction rub. No gallop.   Pulmonary:      Effort: Pulmonary effort is normal. No respiratory distress.      Breath sounds: No stridor. No wheezing or rales.   Chest:      Chest wall: No tenderness.   Abdominal:      General: Bowel sounds are normal.      Palpations: Abdomen is soft. There is no mass.      Tenderness: There is no abdominal tenderness. There is no guarding or rebound.      Hernia: No hernia is present.   Musculoskeletal:         General: No tenderness or deformity. Normal range of motion.      Cervical back: Normal range of motion and neck supple.   Skin:     General: Skin is warm and dry.      Coloration: Skin is not pale.      Findings: No rash.   Neurological:      Mental Status: He is alert and oriented to person, place, and time.      Cranial Nerves: No cranial nerve deficit.      Motor: No abnormal muscle tone.      Coordination: Coordination normal.      Deep Tendon Reflexes: Reflexes are normal and symmetric. Reflexes normal.   Psychiatric:         Behavior: Behavior normal.         Thought Content: Thought content normal.         Judgment: Judgment normal.                Results Review:      Results from last 7 days   Lab Units 06/05/22  0517 06/04/22  1623   SODIUM mmol/L 140 140   CO2 mmol/L 21.0* 18.0*   BUN mg/dL 90* 88*   CREATININE mg/dL 3.99* 4.17*   CALCIUM mg/dL 8.2* 8.3*   EGFR mL/min/1.73 15.4* 14.6*       Results from last 7 days   Lab Units 06/05/22  0517 06/04/22  1623   WBC 10*3/mm3 11.80* 12.60*       Pertinent Imaging studies were reviewed      Medication Review:     amLODIPine, 10 mg, Oral, Daily  vitamin C, 1,000 mg, Oral, Daily  aspirin, 81 mg, Oral, Daily  atorvastatin, 20 mg, Oral, Nightly  insulin glargine, 50 Units, Subcutaneous, Nightly  insulin lispro, 0-9 Units, Subcutaneous, TID AC  insulin lispro, 16 Units, Subcutaneous, TID With Meals  metoprolol tartrate, 25 mg, Oral, BID  multivitamin with minerals, 1 tablet,  Oral, Daily  sodium chloride, 10 mL, Intravenous, Q12H  tamsulosin, 0.8 mg, Oral, Daily      sodium chloride, 100 mL/hr, Last Rate: 100 mL/hr (06/05/22 1115)        Assessment  Acute kidney injury on chronic renal disease increased creatinine at baseline likely secondary to peripheral vascular volume    Mild hyperkalemia likely secondary to renal failure and use of ACE inhibitor.    Hypertension    Diabetes    History of pancreatic cancer      Plan:  Patient's renal function is improved with hydration creatinine is down to 3.9.  Serum potassium 5.1.    Patient can be discharged from my standpoint.  Recommend holding off on ACE inhibitor's and diuretics and patient is seen by Dr. Alaniz in the office which is going to be in about a week's time.          Jonny Tucker MD  06/05/22  14:21 EDT  Tel: [phone]  Fax: [phone]

## 2022-06-05 NOTE — DISCHARGE SUMMARY
[facility]    Mary Le MD    CHIEF COMPLAINT:     Abnormal labs    HISTORY OF PRESENT ILLNESS:    Obtained from ED provider HPI on 6/4/2022:  Patient is a 71 year old white male with history of diabetes, HTN , HLD, chronic kidney disease.  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  He presents today under instruction of his oncologist for abnormal labs.  He states he had routine labs done there today and was told his kidney function was worsening and was sent to ED for Evaluation.  Patient's creatitine normally runs in the low 2s.  He denies any new medications or changes to his medications.  He denies any recent illness.  He denies fever, chills, cough, congestion, vomiting, diarrhea, chest pain, or soa.  He reports no urinary symptoms and states he has been urinating without difficulty.  He states he is not on chemo or radiation at this time.    6/5/2022: Patient confirms the HPI noted above including being contacted by outpatient provider regarding labs which were abnormal.  He denies any symptoms including dyspnea, muscle cramps, pain including chest pain, headache, nausea, vomiting or changes in bowel or bladder habits.  He does note that he has recently begun playing golf more frequently as the weather has improved and has been outdoors in the heat regularly.        Past Medical History:   Diagnosis Date   • Diabetes mellitus (CMS/HCC)    • Hyperlipidemia    • Hypertension    • Sleep apnea      Past Surgical History:   Procedure Laterality Date   • HERNIA REPAIR  1962   • KNEE ARTHROSCOPY Left 1979   • TONSILLECTOMY  1974   • VITRECTOMY PARS PLANA Right 10/22/2019    Procedure: 25G VITRECTOMY-ENDOLASER;  Surgeon: Lazarus, Howard S., MD;  Location: [facility];  Service: Ophthalmology     No family history on file.  Social History     Tobacco Use   • Smoking status: Former Smoker      Quit date: 12/2018     Years since quitting: 3.5   Substance Use Topics   • Alcohol use: Yes     Alcohol/week: 4.0 standard drinks     Types: 4 Cans of beer per week   • Drug use: Yes     Frequency: 5.0 times per week     Types: Marijuana     Medications Prior to Admission   Medication Sig Dispense Refill Last Dose   • ACCU-CHEK HOLLI PLUS test strip 1 each 3 (Three) Times a Day. for testing three times daily breakfast lunch and supper dx:e11.65  11 6/4/2022 at Unknown time   • ACCU-CHEK SOFTCLIX LANCETS lancets 3 (Three) Times a Day. Breakfast lunch and supper testing dx:e11.65  10 6/4/2022 at Unknown time   • amLODIPine (NORVASC) 10 MG tablet Take 10 mg by mouth Daily.   6/3/2022 at Unknown time   • B-D ULTRAFINE III SHORT PEN 31G X 8 MM misc Use for injections four times daily  5 6/4/2022 at Unknown time   • Cholecalciferol (VITAMIN D3) 2000 units tablet Take 2 capsules by mouth Daily.   6/4/2022 at Unknown time   • HUMALOG KWIKPEN 100 UNIT/ML solution pen-injector Inject 22 units ac breakfast, lunch, and supper with a sliding scale, max daily dose 100. Dx:e11.65 90 mL 3 6/4/2022 at Unknown time   • Insulin Glargine, 2 Unit Dial, (Toujeo Max SoloStar) 300 UNIT/ML solution pen-injector injection Inject 50 Units under the skin into the appropriate area as directed Daily.      • lisinopril-hydrochlorothiazide (PRINZIDE,ZESTORETIC) 20-25 MG per tablet Take 1 tablet by mouth Daily.   6/3/2022 at Unknown time   • tamsulosin (FLOMAX) 0.4 MG capsule 24 hr capsule Take 2 capsules by mouth every night at bedtime.   6/4/2022 at Unknown time   • TOUJEO SOLOSTAR 300 UNIT/ML solution pen-injector injection inject 80 units at bedtime dx:e11.65 30 mL 2 6/4/2022 at Unknown time   • vitamin C (ASCORBIC ACID) 250 MG tablet Take 1,000 mg by mouth Daily.   6/4/2022 at Unknown time   • aspirin (ADULT ASPIRIN EC LOW STRENGTH) 81 MG EC tablet Take 81 mg by mouth Daily. Take one daily      • atorvastatin (LIPITOR) 20 MG tablet Take 20 mg  by mouth every night at bedtime.  3    • furosemide (LASIX) 20 MG tablet Take 40 mg by mouth Daily.      • LUMIGAN 0.01 % ophthalmic drops Administer 1 drop to both eyes Every Night. Instill 1 drop into both eyes daily at bedtime  2    • metFORMIN (GLUCOPHAGE) 1000 MG tablet Take 1,000 mg by mouth 2 (Two) Times a Day.  0    • METOPROLOL TARTRATE PO Take 25 mg by mouth 2 (Two) Times a Day.      • Multiple Vitamins-Minerals (GWENDOLYN MULTIVITAMIN FOR MEN) tablet Take 1 tablet by mouth Daily. Take one daily      • tamsulosin (FLOMAX) 0.4 MG capsule 24 hr capsule Take 0.8 mg by mouth Daily.        Allergies:  Patient has no known allergies.    Immunization History   Administered Date(s) Administered   • Pneumococcal, Unspecified 01/01/2016           REVIEW OF SYSTEMS:    Review of Systems   Constitutional: Negative.   HENT: Negative.    Eyes: Negative.    Cardiovascular: Negative.    Respiratory: Negative.    Endocrine: Negative.    Skin: Negative.    Musculoskeletal: Negative.    Gastrointestinal: Negative.    Genitourinary: Negative.    Neurological: Negative.    Psychiatric/Behavioral: Negative.           Vital Signs  Temp:  [97.5 °F (36.4 °C)-98.5 °F (36.9 °C)] 97.6 °F (36.4 °C)  Heart Rate:  [55-71] 55  Resp:  [16-17] 17  BP: (125-181)/(58-80) 158/79          Physical Exam:  Physical Exam  Vitals reviewed.   Constitutional:       General: He is not in acute distress.     Appearance: Normal appearance. He is obese. He is not ill-appearing, toxic-appearing or diaphoretic.   HENT:      Head: Normocephalic and atraumatic.      Right Ear: External ear normal.      Left Ear: External ear normal.      Nose: Nose normal.      Mouth/Throat:      Mouth: Mucous membranes are moist.   Eyes:      Extraocular Movements: Extraocular movements intact.   Cardiovascular:      Rate and Rhythm: Normal rate and regular rhythm.      Pulses: Normal pulses.      Heart sounds: Normal heart sounds.   Pulmonary:      Effort: Pulmonary effort is  normal.      Breath sounds: Normal breath sounds.   Abdominal:      General: Bowel sounds are normal. There is no distension.      Palpations: Abdomen is soft.      Tenderness: There is no abdominal tenderness.   Musculoskeletal:         General: Normal range of motion.      Cervical back: Normal range of motion.      Right lower leg: No edema.      Left lower leg: No edema.   Skin:     General: Skin is warm and dry.      Capillary Refill: Capillary refill takes less than 2 seconds.   Neurological:      General: No focal deficit present.      Mental Status: He is alert and oriented to person, place, and time.   Psychiatric:         Mood and Affect: Mood normal.         Behavior: Behavior normal.         Thought Content: Thought content normal.         Judgment: Judgment normal.            Emotional Behavior:   Normal   Debilities:  None  Results Review:    I reviewed the patient's new clinical results.  Lab Results (most recent)     Procedure Component Value Units Date/Time    Basic Metabolic Panel [[id number]]  (Abnormal) Collected: 06/05/22 0517    Specimen: Blood Updated: 06/05/22 0601     Glucose 235 mg/dL      BUN 90 mg/dL      Creatinine 3.99 mg/dL      Sodium 140 mmol/L      Potassium 5.1 mmol/L      Chloride 105 mmol/L      CO2 21.0 mmol/L      Calcium 8.2 mg/dL      BUN/Creatinine Ratio 22.6     Anion Gap 14.0 mmol/L      eGFR 15.4 mL/min/1.73      Comment: National Kidney Foundation and American Society of Nephrology (ASN) Task Force recommended calculation based on the Chronic Kidney Disease Epidemiology Collaboration (CKD-EPI) equation refit without adjustment for race.       Narrative:      GFR Normal >60  Chronic Kidney Disease <60  Kidney Failure <15      CBC Auto Differential [[id number]]  (Abnormal) Collected: 06/05/22 0517    Specimen: Blood Updated: 06/05/22 0532     WBC 11.80 10*3/mm3      RBC 3.58 10*6/mm3      Hemoglobin 11.1 g/dL      Hematocrit 34.8 %      MCV 97.2 fL      MCH 30.9 pg      MCHC  31.8 g/dL      RDW 13.9 %      RDW-SD 47.7 fl      MPV 10.8 fL      Platelets 198 10*3/mm3      Neutrophil % 68.1 %      Lymphocyte % 17.2 %      Monocyte % 11.6 %      Eosinophil % 2.2 %      Basophil % 0.9 %      Neutrophils, Absolute 8.00 10*3/mm3      Lymphocytes, Absolute 2.00 10*3/mm3      Monocytes, Absolute 1.40 10*3/mm3      Eosinophils, Absolute 0.30 10*3/mm3      Basophils, Absolute 0.10 10*3/mm3      nRBC 0.1 /100 WBC     Urinalysis, Microscopic Only - Urine, Clean Catch [[id number]]  (Abnormal) Collected: 06/04/22 2209    Specimen: Urine, Clean Catch Updated: 06/04/22 2224     RBC, UA 0-2 /HPF      WBC, UA 0-2 /HPF      Bacteria, UA None Seen /HPF      Squamous Epithelial Cells, UA 0-2 /HPF      Hyaline Casts, UA 0-2 /LPF      Methodology Automated Microscopy    Urinalysis With Microscopic If Indicated (No Culture) - Urine, Clean Catch [[id number]]  (Abnormal) Collected: 06/04/22 2209    Specimen: Urine, Clean Catch Updated: 06/04/22 2224     Color, UA Yellow     Appearance, UA Clear     pH, UA <=5.0     Specific Gravity, UA 1.012     Glucose, UA Negative     Ketones, UA Negative     Bilirubin, UA Negative     Blood, UA Negative     Protein,  mg/dL (2+)     Leuk Esterase, UA Negative     Nitrite, UA Negative     Urobilinogen, UA 0.2 E.U./dL    POC Glucose Once [[id number]]  (Abnormal) Collected: 06/04/22 2011    Specimen: Blood Updated: 06/04/22 2013     Glucose 230 mg/dL      Comment: Serial Number: [id number]:  [id number]       COVID PRE-OP / PRE-PROCEDURE SCREENING ORDER (NO ISOLATION) - Swab, Nasopharynx [[id number]]  (Normal) Collected: 06/04/22 1849    Specimen: Swab from Nasopharynx Updated: 06/04/22 1915    Narrative:      The following orders were created for panel order COVID PRE-OP / PRE-PROCEDURE SCREENING ORDER (NO ISOLATION) - Swab, Nasopharynx.  Procedure                               Abnormality         Status                     ---------                                -----------         ------                     COVID-19,CEPHEID/CHRISTIE,CO...[[id number]]  Normal              Final result                 Please view results for these tests on the individual orders.    COVID-19,CEPHEID/CHRISTIE,COR/YULISA/PAD/ARSLAN IN-HOUSE(OR EMERGENT/ADD-ON),NP SWAB IN TRANSPORT MEDIA 3-4 HR TAT, RT-PCR - Swab, Nasopharynx [[id number]]  (Normal) Collected: 06/04/22 1849    Specimen: Swab from Nasopharynx Updated: 06/04/22 1915     COVID19 Not Detected    Narrative:      Fact sheet for providers: https://www.fda.gov/media/058815/download     Fact sheet for patients: https://www.fda.gov/media/041190/download  Fact sheet for providers: https://www.fda.gov/media/566788/download    Fact sheet for patients: https://www.fda.gov/media/003158/download    Test performed by PCR.    Basic Metabolic Panel [[id number]]  (Abnormal) Collected: 06/04/22 1623    Specimen: Blood Updated: 06/04/22 1654     Glucose 46 mg/dL      BUN 88 mg/dL      Creatinine 4.17 mg/dL      Sodium 140 mmol/L      Potassium 5.4 mmol/L      Comment: Slight hemolysis detected by analyzer. Results may be affected.        Chloride 107 mmol/L      CO2 18.0 mmol/L      Calcium 8.3 mg/dL      BUN/Creatinine Ratio 21.1     Anion Gap 15.0 mmol/L      eGFR 14.6 mL/min/1.73      Comment: <15 Indicative of kidney failure       Narrative:      GFR Normal >60  Chronic Kidney Disease <60  Kidney Failure <15      CBC & Differential [[id number]]  (Abnormal) Collected: 06/04/22 1623    Specimen: Blood Updated: 06/04/22 1628    Narrative:      The following orders were created for panel order CBC & Differential.  Procedure                               Abnormality         Status                     ---------                               -----------         ------                     CBC Auto Differential[[id number]]        Abnormal            Final result                 Please view results for these tests on the individual orders.    CBC Auto Differential [[id number]]   (Abnormal) Collected: 06/04/22 1623    Specimen: Blood Updated: 06/04/22 1628     WBC 12.60 10*3/mm3      RBC 3.75 10*6/mm3      Hemoglobin 11.5 g/dL      Hematocrit 36.7 %      MCV 97.9 fL      MCH 30.8 pg      MCHC 31.5 g/dL      RDW 14.0 %      RDW-SD 48.6 fl      MPV 10.2 fL      Platelets 197 10*3/mm3      Neutrophil % 62.5 %      Lymphocyte % 24.3 %      Monocyte % 11.0 %      Eosinophil % 1.6 %      Basophil % 0.6 %      Neutrophils, Absolute 7.90 10*3/mm3      Lymphocytes, Absolute 3.10 10*3/mm3      Monocytes, Absolute 1.40 10*3/mm3      Eosinophils, Absolute 0.20 10*3/mm3      Basophils, Absolute 0.10 10*3/mm3      nRBC 0.1 /100 WBC           Imaging Results (Most Recent)     None        not reviewed    ECG/EMG Results (most recent)     None        not reviewed            Microbiology Results (last 10 days)     Procedure Component Value - Date/Time    COVID PRE-OP / PRE-PROCEDURE SCREENING ORDER (NO ISOLATION) - Swab, Nasopharynx [[id number]]  (Normal) Collected: 06/04/22 1849    Lab Status: Final result Specimen: Swab from Nasopharynx Updated: 06/04/22 1915    Narrative:      The following orders were created for panel order COVID PRE-OP / PRE-PROCEDURE SCREENING ORDER (NO ISOLATION) - Swab, Nasopharynx.  Procedure                               Abnormality         Status                     ---------                               -----------         ------                     COVID-19,CEPHEID/CHRISTIE,CO...[[id number]]  Normal              Final result                 Please view results for these tests on the individual orders.    COVID-19,CEPHEID/CHRISTIE,COR/YULISA/PAD/ARSLAN IN-HOUSE(OR EMERGENT/ADD-ON),NP SWAB IN TRANSPORT MEDIA 3-4 HR TAT, RT-PCR - Swab, Nasopharynx [[id number]]  (Normal) Collected: 06/04/22 1849    Lab Status: Final result Specimen: Swab from Nasopharynx Updated: 06/04/22 1915     COVID19 Not Detected    Narrative:      Fact sheet for providers: https://www.fda.gov/media/365942/download     Fact  sheet for patients: https://www.fda.gov/media/869412/download  Fact sheet for providers: https://www.fda.gov/media/755699/download    Fact sheet for patients: https://www.fda.gov/media/731903/download    Test performed by PCR.          Assessment & Plan     Acute on chronic kidney failure (HCC)       Acute on chronic kidney injury with previous partial nephrectomy with hyperkalemia  - Creatine: 4.17, trended to 3.99 following admission, BUN: 88, BUN/creatinine ratio: 21.1, (Baseline creatinine: 1.7-2.0)  -Potassium initially 5.4, trended to 5.1 following admission and treatment  -UA showed 0-2 RBCs, 0-2 WBCs and 2+ protein  - Hold lisinopril-hydrochlorothiazide and Lasix  -Patient given Lokelma and saline infusion in ED  -Avoid nephrotoxic medication and IV dye unless urgently needed  -Monitor BMP and I's and O's while admitted  -Nephrology consulted in ED    Diabetes mellitus with history of neuroendocrine tumor of pancreas status post Whipple procedure  -Poorly controlled with glucoses ranging from 46-2 35 since admission  -Basal, prandial and sliding scale insulin  -Diabetic diet  -Monitor AC and HS    Hypertension  -Moderately controlled with a most recent blood pressure of 133/73 with fairly labile readings since presentation with a high of 181/66  - Continue metoprolol and amlodipine  -Hold lisinopril-hydrochlorothiazide and Lasix for now  - Monitor while admitted    BPH  -Flomax    Hyperlipidemia  -Statin    Obesity (BMI: 35.91)  -Encouraged on lifestyle modifications    I discussed the patients findings and my recommendations with patient and nursing staff.     Discharge Diagnosis:      Acute on chronic kidney failure (HCC)      Hospital Course  Patient is a 71 y.o. male presented with abnormal labs with increased serum creatinine and potassium noted on blood work at outpatient cancer center with an HPI noted above.  Serum creatinine was initially found to be 4.17 with a BUN of 88 and BUN/creatinine show of  21.1 with a baseline creatinine between 1.7 and 2.0 noted.  Potassium was elevated at 5.4 with UA being generally unremarkable.  Nephrology was contacted in ED who recommended patient be given Lokelma which was ordered along with saline infusion and he was admitted for continued monitoring overnight.  On the morning following admission creatinine had trended to 3.99 with a potassium of 5.1 which is generally patient's baseline potassium.  He was noted to have some hypoglycemia which was treated in the ED with glucose at that time being 46.  Continued monitoring showed glucose increased overnight to 235.  Basal, prandial and sliding scale insulin was ordered.  His lisinopril hydrochlorothiazide was also held.  Nephrology evaluated patient who recommended continuing to hold lisinopril-hydrochlorothiazide as well as Lasix and outpatient follow-up for reassessment in 1 to 2 weeks.  Patient was also given instructions to ensure adequate hydration especially when increasing activity while in warmer weather.  At this time patient is felt to be in good condition for discharge with close follow-up with his PCP as well as nephrology on an outpatient basis.  His full testing/results and plan were discussed with patient along with concerning/alarm symptoms which to call 911/return to the ED.  All questions were answered he verbalizes his understanding and agreement.    Past Medical History:     Past Medical History:   Diagnosis Date   • Diabetes mellitus (CMS/HCC)    • Hyperlipidemia    • Hypertension    • Sleep apnea        Past Surgical History:     Past Surgical History:   Procedure Laterality Date   • HERNIA REPAIR  1962   • KNEE ARTHROSCOPY Left 1979   • TONSILLECTOMY  1974   • VITRECTOMY PARS PLANA Right 10/22/2019    Procedure: 25G VITRECTOMY-ENDOLASER;  Surgeon: Lazarus, Howard S., MD;  Location: [facility] MAIN OR;  Service: Ophthalmology       Social History:   Social History     Socioeconomic History   • Marital status:     Tobacco Use   • Smoking status: Former Smoker     Quit date: 12/2018     Years since quitting: 3.5   Substance and Sexual Activity   • Alcohol use: Yes     Alcohol/week: 4.0 standard drinks     Types: 4 Cans of beer per week   • Drug use: Yes     Frequency: 5.0 times per week     Types: Marijuana   • Sexual activity: Defer       Procedures Performed         Consults:   Consults     Date and Time Order Name Status Description    6/4/2022  4:58 PM Nephrology (on -call MD unless specified)            Condition on Discharge:     Stable    Discharge Disposition  Home or Self Care    Discharge Medications     Discharge Medications      Continue These Medications      Instructions Start Date   Accu-Chek Eleni Plus test strip  Generic drug: glucose blood   1 each, 3 Times Daily, for testing three times daily breakfast lunch and supper dx:e11.65      Accu-Chek Softclix Lancets lancets   3 Times Daily, Breakfast lunch and supper testing dx:e11.65      Adult Aspirin EC Low Strength 81 MG EC tablet  Generic drug: aspirin   81 mg, Oral, Daily, Take one daily      amLODIPine 10 MG tablet  Commonly known as: NORVASC   10 mg, Oral, Daily      atorvastatin 20 MG tablet  Commonly known as: LIPITOR   20 mg, Oral, Every Night at Bedtime      B-D ULTRAFINE III SHORT PEN 31G X 8 MM misc  Generic drug: Insulin Pen Needle   Use for injections four times daily      HumaLOG KwikPen 100 UNIT/ML solution pen-injector  Generic drug: Insulin Lispro (1 Unit Dial)   Inject 22 units ac breakfast, lunch, and supper with a sliding scale, max daily dose 100. Dx:e11.65      Lumigan 0.01 % ophthalmic drops  Generic drug: bimatoprost   1 drop, Both Eyes, Nightly, Instill 1 drop into both eyes daily at bedtime      Manjinder Multivitamin for Men tablet tablet  Generic drug: multivitamin with minerals   1 tablet, Oral, Daily, Take one daily      METOPROLOL TARTRATE PO   25 mg, Oral, 2 Times Daily      tamsulosin 0.4 MG capsule 24 hr capsule  Commonly  known as: FLOMAX   2 capsules, Oral, Every Night at Bedtime      tamsulosin 0.4 MG capsule 24 hr capsule  Commonly known as: FLOMAX   0.8 mg, Oral, Daily      Toujeo SoloStar 300 UNIT/ML solution pen-injector injection  Generic drug: Insulin Glargine (1 Unit Dial)   inject 80 units at bedtime dx:e11.65      Toujeo Max SoloStar 300 UNIT/ML solution pen-injector injection  Generic drug: Insulin Glargine (2 Unit Dial)   50 Units, Subcutaneous, Daily      vitamin C 250 MG tablet  Commonly known as: ASCORBIC ACID   1,000 mg, Oral, Daily      Vitamin D3 50 MCG (2000 UT) tablet   2 capsules, Oral, Daily         Stop These Medications    furosemide 20 MG tablet  Commonly known as: LASIX     lisinopril-hydrochlorothiazide 20-25 MG per tablet  Commonly known as: PRINZIDE,ZESTORETIC     metFORMIN 1000 MG tablet  Commonly known as: GLUCOPHAGE            Discharge Diet:     Activity at Discharge:     Follow-up Appointments  No future appointments.  Additional Instructions for the Follow-ups that You Need to Schedule     Discharge Follow-up with PCP   As directed       Currently Documented PCP:    Mary Le MD    PCP Phone Number:    [phone]     Follow Up Details: 5 to 7 days         Discharge Follow-up with Specified Provider: Nephrology; 2 Weeks   As directed      To: Nephrology    Follow Up: 2 Weeks               Test Results Pending at Discharge       Risk for Readmission (LACE) Score: 6 (6/5/2022  6:00 AM)          Murtaza Jimenez PA-C  06/05/22  14:40 EDT

## 2022-06-05 NOTE — PLAN OF CARE
Problem: Adult Inpatient Plan of Care  Goal: Plan of Care Review  Outcome: Ongoing, Progressing  Goal: Patient-Specific Goal (Individualized)  Outcome: Ongoing, Progressing  Goal: Absence of Hospital-Acquired Illness or Injury  Outcome: Ongoing, Progressing  Intervention: Identify and Manage Fall Risk  Recent Flowsheet Documentation  Taken 6/5/2022 0320 by Annalise Parada RN  Safety Promotion/Fall Prevention: safety round/check completed  Taken 6/5/2022 0115 by Annalise Parada RN  Safety Promotion/Fall Prevention: safety round/check completed  Taken 6/4/2022 2320 by Annalise Parada RN  Safety Promotion/Fall Prevention: safety round/check completed  Taken 6/4/2022 2120 by Annalise Parada RN  Safety Promotion/Fall Prevention: safety round/check completed  Taken 6/4/2022 2001 by Annalise Parada RN  Safety Promotion/Fall Prevention: safety round/check completed  Intervention: Prevent and Manage VTE (Venous Thromboembolism) Risk  Recent Flowsheet Documentation  Taken 6/4/2022 2001 by Annalise Parada RN  VTE Prevention/Management:   bilateral   dorsiflexion/plantar flexion performed  Intervention: Prevent Infection  Recent Flowsheet Documentation  Taken 6/5/2022 0320 by Annalise Parada RN  Infection Prevention:   single patient room provided   rest/sleep promoted   hand hygiene promoted  Taken 6/5/2022 0115 by Annalise Parada RN  Infection Prevention:   hand hygiene promoted   rest/sleep promoted   single patient room provided  Taken 6/4/2022 2320 by Annalise Parada RN  Infection Prevention:   single patient room provided   rest/sleep promoted   hand hygiene promoted  Taken 6/4/2022 2120 by Annalise Parada RN  Infection Prevention:   hand hygiene promoted   rest/sleep promoted   single patient room provided  Taken 6/4/2022 2001 by Annalise Parada RN  Infection Prevention:   single patient room provided   rest/sleep promoted   hand hygiene promoted  Goal: Optimal Comfort and Wellbeing  Outcome: Ongoing, Progressing  Intervention: Provide  Person-Centered Care  Recent Flowsheet Documentation  Taken 6/4/2022 2001 by Annalise Parada, RN  Trust Relationship/Rapport:   care explained   questions answered   questions encouraged   thoughts/feelings acknowledged  Goal: Readiness for Transition of Care  Outcome: Ongoing, Progressing  Intervention: Mutually Develop Transition Plan  Recent Flowsheet Documentation  Taken 6/4/2022 2001 by Annalise Parada, RN  Equipment Currently Used at Home: none  Transportation Anticipated: family or friend will provide  Patient/Family Anticipated Services at Transition: none  Patient/Family Anticipates Transition to: home with family   Goal Outcome Evaluation:         Patient resting in bed comfortably.  No complaints of pain this shift.  Vitals stable and on room air.  Safety precautions in use.  Will continue to monitor.

## 2022-06-05 NOTE — PLAN OF CARE
Problem: Adult Inpatient Plan of Care  Goal: Plan of Care Review  6/5/2022 1555 by Sneha Rhodes RN  Outcome: Met  6/5/2022 1424 by Sneha Rhodes RN  Outcome: Ongoing, Progressing  Goal: Patient-Specific Goal (Individualized)  6/5/2022 1555 by Sneha Rhodes RN  Outcome: Met  6/5/2022 1424 by Sneha Rhodes RN  Outcome: Ongoing, Progressing  Goal: Absence of Hospital-Acquired Illness or Injury  6/5/2022 1555 by Sneha Rhodes RN  Outcome: Met  6/5/2022 1424 by Sneha Rhodes RN  Outcome: Ongoing, Progressing  Intervention: Identify and Manage Fall Risk  Description: Perform standard risk assessment on admission using a validated tool or comprehensive approach appropriate to the patient; reassess fall risk frequently, with change in status or transfer to another level of care.  Communicate fall injury risk to interprofessional healthcare team.  Determine need for increased observation, equipment and environmental modification, such as low bed, signage and supportive, nonskid footwear.  Adjust safety measures to individual developmental age, stage and identified risk factors.  Reinforce the importance of safety and physical activity with patient and family.  Perform regular intentional rounding to assess need for position change, pain assessment and personal needs, including assistance with toileting.  Recent Flowsheet Documentation  Taken 6/5/2022 1500 by Sneha Rhodes RN  Safety Promotion/Fall Prevention:   assistive device/personal items within reach   clutter free environment maintained   room organization consistent   safety round/check completed  Taken 6/5/2022 1300 by Sneha Rhodes RN  Safety Promotion/Fall Prevention:   assistive device/personal items within reach   clutter free environment maintained   safety round/check completed   room organization consistent  Taken 6/5/2022 1115 by Sneha Rhodes RN  Safety Promotion/Fall Prevention:   assistive device/personal items within reach   clutter free  environment maintained   safety round/check completed   room organization consistent  Intervention: Prevent Skin Injury  Description: Perform a screening for skin injury risk, such as pressure or moisture associated skin damage on admission and at regular intervals throughout hospital stay.  Keep all areas of skin (especially folds) clean and dry.  Maintain adequate skin hydration.  Relieve and redistribute pressure and protect bony prominences; implement measures based on patient-specific risk factors.  Match turning and repositioning schedule to clinical condition.  Encourage weight shift frequently; assist with reposition if unable to complete independently.  Float heels off bed; avoid pressure on the Achilles tendon.  Keep skin free from extended contact with medical devices.  Encourage functional activity and mobility, as early as tolerated.  Use aids (e.g., slide boards, mechanical lift) during transfer.  Recent Flowsheet Documentation  Taken 6/5/2022 1500 by Sneha Rhodes RN  Body Position:   position changed independently   sitting up in bed  Taken 6/5/2022 1300 by Sneha Rhodes RN  Body Position:   supine   position changed independently  Taken 6/5/2022 1115 by Sneha Rhodes RN  Body Position:   supine   position changed independently  Intervention: Prevent and Manage VTE (Venous Thromboembolism) Risk  Description: Assess for VTE (venous thromboembolism) risk.  Encourage and assist with early ambulation.  Initiate and maintain compression or other therapy, as indicated, based on identified risk in accordance with organizational protocol and provider order.  Encourage both active and passive leg exercises while in bed, if unable to ambulate.  Recent Flowsheet Documentation  Taken 6/5/2022 1500 by Sneha Rhodes RN  Activity Management:   activity encouraged   up ad april  Taken 6/5/2022 1300 by Sneha Rhodes RN  Activity Management:   up ad april   activity encouraged  Taken 6/5/2022 1115 by Sneha Rhodes  RN  Activity Management: activity encouraged  Intervention: Prevent Infection  Description: Maintain skin and mucous membrane integrity; promote hand, oral and pulmonary hygiene.  Optimize fluid balance, nutrition, sleep and glycemic control to maximize infection resistance.  Identify potential sources of infection early to prevent or mitigate progression of infection (e.g., wound, lines, devices).  Evaluate ongoing need for invasive devices; remove promptly when no longer indicated.  Recent Flowsheet Documentation  Taken 6/5/2022 1500 by Sneha Rhodes RN  Infection Prevention:   single patient room provided   rest/sleep promoted  Taken 6/5/2022 1300 by Sneha Rhodse RN  Infection Prevention:   single patient room provided   rest/sleep promoted  Taken 6/5/2022 1115 by Sneha Rhodes RN  Infection Prevention:   single patient room provided   rest/sleep promoted  Goal: Optimal Comfort and Wellbeing  6/5/2022 1555 by Sneha Rhodes RN  Outcome: Met  6/5/2022 1424 by Sneha Rhodes RN  Outcome: Ongoing, Progressing  Intervention: Monitor Pain and Promote Comfort  Description: Assess pain level, treatment efficacy and patient response at regular intervals using a consistent pain scale.  Consider the presence and impact of preexisting chronic pain.  Encourage patient and caregiver involvement in pain assessment, interventions and safety measures.  Recent Flowsheet Documentation  Taken 6/5/2022 1500 by Sneha Rhodes RN  Pain Management Interventions: quiet environment facilitated  Taken 6/5/2022 1115 by Sneha Rhodes RN  Pain Management Interventions: quiet environment facilitated  Intervention: Provide Person-Centered Care  Description: Use a family-focused approach to care.  Develop trust and rapport by proactively providing information, encouraging questions, addressing concerns and offering reassurance.  Acknowledge emotional response to hospitalization.  Recognize and utilize personal coping strategies.  Honor  spiritual and cultural preferences.  Recent Flowsheet Documentation  Taken 6/5/2022 1115 by Sneha Rhodes, RN  Trust Relationship/Rapport:   thoughts/feelings acknowledged   choices provided   care explained   empathic listening provided   emotional support provided   questions answered   questions encouraged   reassurance provided  Goal: Readiness for Transition of Care  6/5/2022 1555 by Sneha Rhodes, RN  Outcome: Met  6/5/2022 1424 by Sneha Rhodes, RN  Outcome: Ongoing, Progressing   Goal Outcome Evaluation:      Discharging home.

## 2022-06-06 NOTE — CASE MANAGEMENT/SOCIAL WORK
Case Management Discharge Note      Final Note: Discharged prior to CM assessment.     Transportation Services  Private: Car    Final Discharge Disposition Code: 01 - home or self-care

## 2023-01-09 ENCOUNTER — INPATIENT HOSPITAL (OUTPATIENT)
Dept: URBAN - METROPOLITAN AREA HOSPITAL 76 | Facility: HOSPITAL | Age: 73
End: 2023-01-09

## 2023-01-09 DIAGNOSIS — R07.89 OTHER CHEST PAIN: ICD-10-CM

## 2023-01-09 DIAGNOSIS — Z87.19 PERSONAL HISTORY OF OTHER DISEASES OF THE DIGESTIVE SYSTEM: ICD-10-CM

## 2023-01-09 DIAGNOSIS — D64.89 OTHER SPECIFIED ANEMIAS: ICD-10-CM

## 2023-01-09 DIAGNOSIS — I16.0 HYPERTENSIVE URGENCY: ICD-10-CM

## 2023-01-09 DIAGNOSIS — K59.00 CONSTIPATION, UNSPECIFIED: ICD-10-CM

## 2023-01-09 PROCEDURE — 99222 1ST HOSP IP/OBS MODERATE 55: CPT | Mod: FS | Performed by: NURSE PRACTITIONER

## 2023-02-15 ENCOUNTER — OFFICE (OUTPATIENT)
Dept: URBAN - METROPOLITAN AREA CLINIC 64 | Facility: CLINIC | Age: 73
End: 2023-02-15

## 2023-02-15 VITALS
DIASTOLIC BLOOD PRESSURE: 61 MMHG | HEIGHT: 72 IN | WEIGHT: 282 LBS | HEART RATE: 62 BPM | SYSTOLIC BLOOD PRESSURE: 125 MMHG

## 2023-02-15 DIAGNOSIS — N18.4 CHRONIC KIDNEY DISEASE, STAGE 4 (SEVERE): ICD-10-CM

## 2023-02-15 DIAGNOSIS — D3A.8 OTHER BENIGN NEUROENDOCRINE TUMORS: ICD-10-CM

## 2023-02-15 DIAGNOSIS — R10.13 EPIGASTRIC PAIN: ICD-10-CM

## 2023-02-15 DIAGNOSIS — R93.3 ABNORMAL FINDINGS ON DIAGNOSTIC IMAGING OF OTHER PARTS OF DI: ICD-10-CM

## 2023-02-15 PROCEDURE — 99214 OFFICE O/P EST MOD 30 MIN: CPT | Performed by: INTERNAL MEDICINE

## 2023-02-15 RX ORDER — PANTOPRAZOLE SODIUM 40 MG/1
40 TABLET, DELAYED RELEASE ORAL
Qty: 30 | Refills: 11 | Status: COMPLETED
Start: 2023-02-15 | End: 2024-08-22

## 2023-03-07 ENCOUNTER — ON CAMPUS - OUTPATIENT (OUTPATIENT)
Dept: URBAN - METROPOLITAN AREA HOSPITAL 77 | Facility: HOSPITAL | Age: 73
End: 2023-03-07

## 2023-03-07 DIAGNOSIS — Z90.411 ACQUIRED PARTIAL ABSENCE OF PANCREAS: ICD-10-CM

## 2023-03-07 DIAGNOSIS — R10.13 EPIGASTRIC PAIN: ICD-10-CM

## 2023-03-07 DIAGNOSIS — K31.84 GASTROPARESIS: ICD-10-CM

## 2023-03-07 DIAGNOSIS — K86.89 OTHER SPECIFIED DISEASES OF PANCREAS: ICD-10-CM

## 2023-03-07 DIAGNOSIS — K44.9 DIAPHRAGMATIC HERNIA WITHOUT OBSTRUCTION OR GANGRENE: ICD-10-CM

## 2023-03-07 DIAGNOSIS — Z85.09 PERSONAL HISTORY OF MALIGNANT NEOPLASM OF OTHER DIGESTIVE OR: ICD-10-CM

## 2023-03-07 DIAGNOSIS — R93.3 ABNORMAL FINDINGS ON DIAGNOSTIC IMAGING OF OTHER PARTS OF DI: ICD-10-CM

## 2023-03-07 DIAGNOSIS — T18.2XXA FOREIGN BODY IN STOMACH, INITIAL ENCOUNTER: ICD-10-CM

## 2023-03-07 DIAGNOSIS — R11.0 NAUSEA: ICD-10-CM

## 2023-03-07 DIAGNOSIS — K29.60 OTHER GASTRITIS WITHOUT BLEEDING: ICD-10-CM

## 2023-03-07 PROCEDURE — 43259 EGD US EXAM DUODENUM/JEJUNUM: CPT | Mod: XS | Performed by: INTERNAL MEDICINE

## 2023-03-07 PROCEDURE — 43247 EGD REMOVE FOREIGN BODY: CPT | Performed by: INTERNAL MEDICINE

## 2023-03-20 ENCOUNTER — APPOINTMENT (OUTPATIENT)
Dept: INTERVENTIONAL RADIOLOGY/VASCULAR | Facility: HOSPITAL | Age: 73
DRG: 673 | End: 2023-03-20
Payer: MEDICARE

## 2023-03-20 ENCOUNTER — APPOINTMENT (OUTPATIENT)
Dept: GENERAL RADIOLOGY | Facility: HOSPITAL | Age: 73
DRG: 673 | End: 2023-03-20
Payer: MEDICARE

## 2023-03-20 ENCOUNTER — HOSPITAL ENCOUNTER (INPATIENT)
Facility: HOSPITAL | Age: 73
LOS: 5 days | Discharge: HOME | DRG: 673 | End: 2023-03-25
Attending: EMERGENCY MEDICINE | Admitting: INTERNAL MEDICINE
Payer: MEDICARE

## 2023-03-20 DIAGNOSIS — N18.9 ACUTE KIDNEY INJURY SUPERIMPOSED ON CKD: Primary | ICD-10-CM

## 2023-03-20 DIAGNOSIS — E87.5 HYPERKALEMIA, DIMINISHED RENAL EXCRETION: ICD-10-CM

## 2023-03-20 DIAGNOSIS — E86.0 DEHYDRATION, MODERATE: ICD-10-CM

## 2023-03-20 DIAGNOSIS — D3A.8 PRIMARY PANCREATIC NEUROENDOCRINE TUMOR: ICD-10-CM

## 2023-03-20 DIAGNOSIS — N17.9 ACUTE KIDNEY INJURY SUPERIMPOSED ON CKD: Primary | ICD-10-CM

## 2023-03-20 LAB
ALBUMIN SERPL-MCNC: 3.9 G/DL (ref 3.5–5.2)
ALBUMIN/GLOB SERPL: 1.8 G/DL
ALP SERPL-CCNC: 100 U/L (ref 39–117)
ALT SERPL W P-5'-P-CCNC: 12 U/L (ref 1–41)
ANION GAP SERPL CALCULATED.3IONS-SCNC: 19 MMOL/L (ref 5–15)
APTT PPP: 29 SECONDS (ref 24–31)
AST SERPL-CCNC: 10 U/L (ref 1–40)
BACTERIA UR QL AUTO: ABNORMAL /HPF
BASOPHILS # BLD AUTO: 0.1 10*3/MM3 (ref 0–0.2)
BASOPHILS NFR BLD AUTO: 0.6 % (ref 0–1.5)
BILIRUB SERPL-MCNC: 0.3 MG/DL (ref 0–1.2)
BILIRUB UR QL STRIP: NEGATIVE
BUN SERPL-MCNC: 110 MG/DL (ref 8–23)
BUN/CREAT SERPL: 16 (ref 7–25)
CALCIUM SPEC-SCNC: 7.8 MG/DL (ref 8.6–10.5)
CHLORIDE SERPL-SCNC: 107 MMOL/L (ref 98–107)
CLARITY UR: ABNORMAL
CO2 SERPL-SCNC: 14 MMOL/L (ref 22–29)
COLOR UR: YELLOW
CREAT SERPL-MCNC: 6.89 MG/DL (ref 0.76–1.27)
DEPRECATED RDW RBC AUTO: 52.1 FL (ref 37–54)
EGFRCR SERPLBLD CKD-EPI 2021: 7.9 ML/MIN/1.73
EOSINOPHIL # BLD AUTO: 0.2 10*3/MM3 (ref 0–0.4)
EOSINOPHIL NFR BLD AUTO: 1.8 % (ref 0.3–6.2)
ERYTHROCYTE [DISTWIDTH] IN BLOOD BY AUTOMATED COUNT: 14.8 % (ref 12.3–15.4)
GLOBULIN UR ELPH-MCNC: 2.2 GM/DL
GLUCOSE BLDC GLUCOMTR-MCNC: 123 MG/DL (ref 70–105)
GLUCOSE BLDC GLUCOMTR-MCNC: 136 MG/DL (ref 70–105)
GLUCOSE BLDC GLUCOMTR-MCNC: 139 MG/DL (ref 70–105)
GLUCOSE SERPL-MCNC: 167 MG/DL (ref 65–99)
GLUCOSE UR STRIP-MCNC: NEGATIVE MG/DL
GRAN CASTS URNS QL MICRO: ABNORMAL /LPF
HBV SURFACE AB SER RIA-ACNC: NORMAL
HBV SURFACE AG SERPL QL IA: NORMAL
HCT VFR BLD AUTO: 28.2 % (ref 37.5–51)
HGB BLD-MCNC: 8.8 G/DL (ref 13–17.7)
HGB UR QL STRIP.AUTO: NEGATIVE
HOLD SPECIMEN: NORMAL
HOLD SPECIMEN: NORMAL
HYALINE CASTS UR QL AUTO: ABNORMAL /LPF
INR PPP: 1.06 (ref 0.93–1.1)
KETONES UR QL STRIP: ABNORMAL
LEUKOCYTE ESTERASE UR QL STRIP.AUTO: ABNORMAL
LIPASE SERPL-CCNC: 7 U/L (ref 13–60)
LYMPHOCYTES # BLD AUTO: 1.2 10*3/MM3 (ref 0.7–3.1)
LYMPHOCYTES NFR BLD AUTO: 12 % (ref 19.6–45.3)
MCH RBC QN AUTO: 29.4 PG (ref 26.6–33)
MCHC RBC AUTO-ENTMCNC: 31.2 G/DL (ref 31.5–35.7)
MCV RBC AUTO: 94.3 FL (ref 79–97)
MONOCYTES # BLD AUTO: 1.1 10*3/MM3 (ref 0.1–0.9)
MONOCYTES NFR BLD AUTO: 11.1 % (ref 5–12)
NEUTROPHILS NFR BLD AUTO: 7.3 10*3/MM3 (ref 1.7–7)
NEUTROPHILS NFR BLD AUTO: 74.5 % (ref 42.7–76)
NITRITE UR QL STRIP: NEGATIVE
NRBC BLD AUTO-RTO: 0.2 /100 WBC (ref 0–0.2)
NT-PROBNP SERPL-MCNC: 3192 PG/ML (ref 0–900)
PH UR STRIP.AUTO: <=5 [PH] (ref 5–8)
PLATELET # BLD AUTO: 164 10*3/MM3 (ref 140–450)
PMV BLD AUTO: 10.8 FL (ref 6–12)
POTASSIUM SERPL-SCNC: 5.7 MMOL/L (ref 3.5–5.2)
PROT SERPL-MCNC: 6.1 G/DL (ref 6–8.5)
PROT UR QL STRIP: ABNORMAL
PROTHROMBIN TIME: 10.9 SECONDS (ref 9.6–11.7)
RBC # BLD AUTO: 2.99 10*6/MM3 (ref 4.14–5.8)
RBC # UR STRIP: ABNORMAL /HPF
REF LAB TEST METHOD: ABNORMAL
SODIUM SERPL-SCNC: 140 MMOL/L (ref 136–145)
SP GR UR STRIP: 1.02 (ref 1–1.03)
SQUAMOUS #/AREA URNS HPF: ABNORMAL /HPF
UROBILINOGEN UR QL STRIP: ABNORMAL
WBC # UR STRIP: ABNORMAL /HPF
WBC NRBC COR # BLD: 9.8 10*3/MM3 (ref 3.4–10.8)
WHOLE BLOOD HOLD COAG: NORMAL
WHOLE BLOOD HOLD SPECIMEN: NORMAL

## 2023-03-20 PROCEDURE — 99152 MOD SED SAME PHYS/QHP 5/>YRS: CPT

## 2023-03-20 PROCEDURE — 93010 ELECTROCARDIOGRAM REPORT: CPT | Performed by: INTERNAL MEDICINE

## 2023-03-20 PROCEDURE — 36415 COLL VENOUS BLD VENIPUNCTURE: CPT

## 2023-03-20 PROCEDURE — 87086 URINE CULTURE/COLONY COUNT: CPT | Performed by: NURSE PRACTITIONER

## 2023-03-20 PROCEDURE — 25010000002 FENTANYL CITRATE (PF) 50 MCG/ML SOLUTION: Performed by: RADIOLOGY

## 2023-03-20 PROCEDURE — 87340 HEPATITIS B SURFACE AG IA: CPT | Performed by: INTERNAL MEDICINE

## 2023-03-20 PROCEDURE — 93005 ELECTROCARDIOGRAM TRACING: CPT | Performed by: NURSE PRACTITIONER

## 2023-03-20 PROCEDURE — 85025 COMPLETE CBC W/AUTO DIFF WBC: CPT | Performed by: NURSE PRACTITIONER

## 2023-03-20 PROCEDURE — C1894 INTRO/SHEATH, NON-LASER: HCPCS

## 2023-03-20 PROCEDURE — 25010000002 HYDROMORPHONE 1 MG/ML SOLUTION: Performed by: NURSE PRACTITIONER

## 2023-03-20 PROCEDURE — 81001 URINALYSIS AUTO W/SCOPE: CPT | Performed by: NURSE PRACTITIONER

## 2023-03-20 PROCEDURE — 77001 FLUOROGUIDE FOR VEIN DEVICE: CPT

## 2023-03-20 PROCEDURE — 86704 HEP B CORE ANTIBODY TOTAL: CPT | Performed by: INTERNAL MEDICINE

## 2023-03-20 PROCEDURE — 02HV33Z INSERTION OF INFUSION DEVICE INTO SUPERIOR VENA CAVA, PERCUTANEOUS APPROACH: ICD-10-PCS | Performed by: RADIOLOGY

## 2023-03-20 PROCEDURE — 25010000002 HEPARIN (PORCINE) PER 1000 UNITS: Performed by: RADIOLOGY

## 2023-03-20 PROCEDURE — 99285 EMERGENCY DEPT VISIT HI MDM: CPT

## 2023-03-20 PROCEDURE — 63710000001 INSULIN GLARGINE PER 5 UNITS: Performed by: NURSE PRACTITIONER

## 2023-03-20 PROCEDURE — 83880 ASSAY OF NATRIURETIC PEPTIDE: CPT | Performed by: NURSE PRACTITIONER

## 2023-03-20 PROCEDURE — 25010000002 MIDAZOLAM PER 1 MG: Performed by: RADIOLOGY

## 2023-03-20 PROCEDURE — 86706 HEP B SURFACE ANTIBODY: CPT | Performed by: INTERNAL MEDICINE

## 2023-03-20 PROCEDURE — C1750 CATH, HEMODIALYSIS,LONG-TERM: HCPCS

## 2023-03-20 PROCEDURE — 36558 INSERT TUNNELED CV CATH: CPT

## 2023-03-20 PROCEDURE — 82962 GLUCOSE BLOOD TEST: CPT

## 2023-03-20 PROCEDURE — 5A1D70Z PERFORMANCE OF URINARY FILTRATION, INTERMITTENT, LESS THAN 6 HOURS PER DAY: ICD-10-PCS | Performed by: INTERNAL MEDICINE

## 2023-03-20 PROCEDURE — 99153 MOD SED SAME PHYS/QHP EA: CPT

## 2023-03-20 PROCEDURE — 25010000002 MORPHINE PER 10 MG: Performed by: NURSE PRACTITIONER

## 2023-03-20 PROCEDURE — 0 LIDOCAINE 1 % SOLUTION: Performed by: RADIOLOGY

## 2023-03-20 PROCEDURE — 0JH63XZ INSERTION OF TUNNELED VASCULAR ACCESS DEVICE INTO CHEST SUBCUTANEOUS TISSUE AND FASCIA, PERCUTANEOUS APPROACH: ICD-10-PCS | Performed by: RADIOLOGY

## 2023-03-20 PROCEDURE — 85730 THROMBOPLASTIN TIME PARTIAL: CPT | Performed by: RADIOLOGY

## 2023-03-20 PROCEDURE — 83690 ASSAY OF LIPASE: CPT | Performed by: NURSE PRACTITIONER

## 2023-03-20 PROCEDURE — 25010000002 ONDANSETRON PER 1 MG: Performed by: NURSE PRACTITIONER

## 2023-03-20 PROCEDURE — 85610 PROTHROMBIN TIME: CPT | Performed by: RADIOLOGY

## 2023-03-20 PROCEDURE — 71045 X-RAY EXAM CHEST 1 VIEW: CPT

## 2023-03-20 PROCEDURE — 76937 US GUIDE VASCULAR ACCESS: CPT

## 2023-03-20 PROCEDURE — 80053 COMPREHEN METABOLIC PANEL: CPT | Performed by: NURSE PRACTITIONER

## 2023-03-20 RX ORDER — NICOTINE POLACRILEX 4 MG
15 LOZENGE BUCCAL
Status: DISCONTINUED | OUTPATIENT
Start: 2023-03-20 | End: 2023-03-22

## 2023-03-20 RX ORDER — DOXAZOSIN 8 MG/1
8 TABLET ORAL 2 TIMES DAILY
COMMUNITY

## 2023-03-20 RX ORDER — OLANZAPINE 10 MG/2ML
1 INJECTION, POWDER, LYOPHILIZED, FOR SOLUTION INTRAMUSCULAR
Status: DISCONTINUED | OUTPATIENT
Start: 2023-03-20 | End: 2023-03-22

## 2023-03-20 RX ORDER — CALCITRIOL 0.5 UG/1
0.5 CAPSULE, LIQUID FILLED ORAL 3 TIMES WEEKLY
COMMUNITY

## 2023-03-20 RX ORDER — IBANDRONATE SODIUM 150 MG/1
150 TABLET, FILM COATED ORAL
COMMUNITY

## 2023-03-20 RX ORDER — LEVOCETIRIZINE DIHYDROCHLORIDE 5 MG/1
5 TABLET, FILM COATED ORAL EVERY EVENING
COMMUNITY

## 2023-03-20 RX ORDER — SODIUM CHLORIDE 0.9 % (FLUSH) 0.9 %
10 SYRINGE (ML) INJECTION AS NEEDED
Status: DISCONTINUED | OUTPATIENT
Start: 2023-03-20 | End: 2023-03-25 | Stop reason: HOSPADM

## 2023-03-20 RX ORDER — INSULIN LISPRO 100 [IU]/ML
9 INJECTION, SOLUTION INTRAVENOUS; SUBCUTANEOUS EVERY MORNING
COMMUNITY
End: 2023-03-25 | Stop reason: HOSPADM

## 2023-03-20 RX ORDER — ACETAMINOPHEN 650 MG/1
650 SUPPOSITORY RECTAL EVERY 4 HOURS PRN
Status: DISCONTINUED | OUTPATIENT
Start: 2023-03-20 | End: 2023-03-25 | Stop reason: HOSPADM

## 2023-03-20 RX ORDER — FUROSEMIDE 40 MG/1
40 TABLET ORAL DAILY
COMMUNITY
End: 2023-03-25 | Stop reason: HOSPADM

## 2023-03-20 RX ORDER — HYDROCODONE BITARTRATE AND ACETAMINOPHEN 5; 325 MG/1; MG/1
1 TABLET ORAL EVERY 4 HOURS PRN
Status: DISCONTINUED | OUTPATIENT
Start: 2023-03-20 | End: 2023-03-25 | Stop reason: HOSPADM

## 2023-03-20 RX ORDER — LISINOPRIL AND HYDROCHLOROTHIAZIDE 25; 20 MG/1; MG/1
1 TABLET ORAL DAILY
COMMUNITY
End: 2023-03-25 | Stop reason: HOSPADM

## 2023-03-20 RX ORDER — DEXTROSE MONOHYDRATE 25 G/50ML
25 INJECTION, SOLUTION INTRAVENOUS
Status: DISCONTINUED | OUTPATIENT
Start: 2023-03-20 | End: 2023-03-22

## 2023-03-20 RX ORDER — CALCITRIOL 0.25 UG/1
0.5 CAPSULE, LIQUID FILLED ORAL 3 TIMES WEEKLY
Status: DISCONTINUED | OUTPATIENT
Start: 2023-03-20 | End: 2023-03-25 | Stop reason: HOSPADM

## 2023-03-20 RX ORDER — LATANOPROST 50 UG/ML
1 SOLUTION/ DROPS OPHTHALMIC NIGHTLY
Status: DISCONTINUED | OUTPATIENT
Start: 2023-03-20 | End: 2023-03-25 | Stop reason: HOSPADM

## 2023-03-20 RX ORDER — CETIRIZINE HYDROCHLORIDE 10 MG/1
10 TABLET ORAL DAILY
Status: DISCONTINUED | OUTPATIENT
Start: 2023-03-20 | End: 2023-03-25 | Stop reason: HOSPADM

## 2023-03-20 RX ORDER — ONDANSETRON 2 MG/ML
4 INJECTION INTRAMUSCULAR; INTRAVENOUS ONCE
Status: COMPLETED | OUTPATIENT
Start: 2023-03-20 | End: 2023-03-20

## 2023-03-20 RX ORDER — LIDOCAINE HYDROCHLORIDE 10 MG/ML
INJECTION, SOLUTION INFILTRATION; PERINEURAL AS NEEDED
Status: COMPLETED | OUTPATIENT
Start: 2023-03-20 | End: 2023-03-20

## 2023-03-20 RX ORDER — INSULIN LISPRO 100 [IU]/ML
9 INJECTION, SOLUTION INTRAVENOUS; SUBCUTANEOUS
Status: DISCONTINUED | OUTPATIENT
Start: 2023-03-20 | End: 2023-03-22

## 2023-03-20 RX ORDER — MIDAZOLAM HYDROCHLORIDE 1 MG/ML
INJECTION INTRAMUSCULAR; INTRAVENOUS AS NEEDED
Status: COMPLETED | OUTPATIENT
Start: 2023-03-20 | End: 2023-03-20

## 2023-03-20 RX ORDER — ONDANSETRON 2 MG/ML
4 INJECTION INTRAMUSCULAR; INTRAVENOUS EVERY 6 HOURS PRN
Status: DISCONTINUED | OUTPATIENT
Start: 2023-03-20 | End: 2023-03-25 | Stop reason: HOSPADM

## 2023-03-20 RX ORDER — HEPARIN SODIUM 5000 [USP'U]/ML
5000 INJECTION, SOLUTION INTRAVENOUS; SUBCUTANEOUS EVERY 8 HOURS SCHEDULED
Status: DISCONTINUED | OUTPATIENT
Start: 2023-03-21 | End: 2023-03-25 | Stop reason: HOSPADM

## 2023-03-20 RX ORDER — CYANOCOBALAMIN 1000 UG/ML
1000 INJECTION, SOLUTION INTRAMUSCULAR; SUBCUTANEOUS
COMMUNITY

## 2023-03-20 RX ORDER — ACETAMINOPHEN 160 MG/5ML
650 SOLUTION ORAL EVERY 4 HOURS PRN
Status: DISCONTINUED | OUTPATIENT
Start: 2023-03-20 | End: 2023-03-25 | Stop reason: HOSPADM

## 2023-03-20 RX ORDER — SODIUM CHLORIDE 0.9 % (FLUSH) 0.9 %
10 SYRINGE (ML) INJECTION EVERY 12 HOURS SCHEDULED
Status: DISCONTINUED | OUTPATIENT
Start: 2023-03-20 | End: 2023-03-25 | Stop reason: HOSPADM

## 2023-03-20 RX ORDER — INSULIN LISPRO 100 [IU]/ML
13 INJECTION, SOLUTION INTRAVENOUS; SUBCUTANEOUS
COMMUNITY
End: 2023-03-25 | Stop reason: HOSPADM

## 2023-03-20 RX ORDER — ONDANSETRON 4 MG/1
4 TABLET, FILM COATED ORAL EVERY 6 HOURS PRN
Status: DISCONTINUED | OUTPATIENT
Start: 2023-03-20 | End: 2023-03-25 | Stop reason: HOSPADM

## 2023-03-20 RX ORDER — ACETAMINOPHEN 325 MG/1
650 TABLET ORAL EVERY 4 HOURS PRN
Status: DISCONTINUED | OUTPATIENT
Start: 2023-03-20 | End: 2023-03-25 | Stop reason: HOSPADM

## 2023-03-20 RX ORDER — INSULIN LISPRO 100 [IU]/ML
9 INJECTION, SOLUTION INTRAVENOUS; SUBCUTANEOUS EVERY MORNING
Status: DISCONTINUED | OUTPATIENT
Start: 2023-03-21 | End: 2023-03-22

## 2023-03-20 RX ORDER — SODIUM CHLORIDE 9 MG/ML
40 INJECTION, SOLUTION INTRAVENOUS AS NEEDED
Status: DISCONTINUED | OUTPATIENT
Start: 2023-03-20 | End: 2023-03-25 | Stop reason: HOSPADM

## 2023-03-20 RX ORDER — NITROGLYCERIN 0.4 MG/1
0.4 TABLET SUBLINGUAL
Status: DISCONTINUED | OUTPATIENT
Start: 2023-03-20 | End: 2023-03-25 | Stop reason: HOSPADM

## 2023-03-20 RX ORDER — HEPARIN SODIUM 1000 [USP'U]/ML
INJECTION, SOLUTION INTRAVENOUS; SUBCUTANEOUS AS NEEDED
Status: COMPLETED | OUTPATIENT
Start: 2023-03-20 | End: 2023-03-20

## 2023-03-20 RX ORDER — FENTANYL CITRATE 50 UG/ML
INJECTION, SOLUTION INTRAMUSCULAR; INTRAVENOUS AS NEEDED
Status: COMPLETED | OUTPATIENT
Start: 2023-03-20 | End: 2023-03-20

## 2023-03-20 RX ORDER — METOCLOPRAMIDE 10 MG/1
10 TABLET ORAL 2 TIMES DAILY
Status: DISCONTINUED | OUTPATIENT
Start: 2023-03-20 | End: 2023-03-25 | Stop reason: HOSPADM

## 2023-03-20 RX ORDER — INSULIN LISPRO 100 [IU]/ML
9 INJECTION, SOLUTION INTRAVENOUS; SUBCUTANEOUS
COMMUNITY
End: 2023-03-25 | Stop reason: HOSPADM

## 2023-03-20 RX ORDER — INSULIN LISPRO 100 [IU]/ML
13 INJECTION, SOLUTION INTRAVENOUS; SUBCUTANEOUS
Status: DISCONTINUED | OUTPATIENT
Start: 2023-03-20 | End: 2023-03-22

## 2023-03-20 RX ORDER — MIDODRINE HYDROCHLORIDE 5 MG/1
5 TABLET ORAL 3 TIMES DAILY PRN
Status: DISCONTINUED | OUTPATIENT
Start: 2023-03-20 | End: 2023-03-25 | Stop reason: HOSPADM

## 2023-03-20 RX ORDER — HYDRALAZINE HYDROCHLORIDE 100 MG/1
100 TABLET, FILM COATED ORAL 3 TIMES DAILY
COMMUNITY

## 2023-03-20 RX ORDER — METOCLOPRAMIDE 10 MG/1
10 TABLET ORAL 4 TIMES DAILY
COMMUNITY

## 2023-03-20 RX ADMIN — HYDROMORPHONE HYDROCHLORIDE 1 MG: 1 INJECTION, SOLUTION INTRAMUSCULAR; INTRAVENOUS; SUBCUTANEOUS at 12:41

## 2023-03-20 RX ADMIN — MIDAZOLAM 1 MG: 1 INJECTION INTRAMUSCULAR; INTRAVENOUS at 14:53

## 2023-03-20 RX ADMIN — HEPARIN SODIUM 4500 UNITS: 1000 INJECTION, SOLUTION INTRAVENOUS; SUBCUTANEOUS at 15:26

## 2023-03-20 RX ADMIN — LIDOCAINE HYDROCHLORIDE 4 ML: 10 INJECTION, SOLUTION INFILTRATION; PERINEURAL at 15:00

## 2023-03-20 RX ADMIN — SODIUM CHLORIDE 500 ML: 9 INJECTION, SOLUTION INTRAVENOUS at 12:41

## 2023-03-20 RX ADMIN — ONDANSETRON 4 MG: 2 INJECTION INTRAMUSCULAR; INTRAVENOUS at 10:48

## 2023-03-20 RX ADMIN — MORPHINE SULFATE 4 MG: 4 INJECTION INTRAVENOUS at 10:48

## 2023-03-20 RX ADMIN — LIDOCAINE HYDROCHLORIDE 10 ML: 10 INJECTION, SOLUTION INFILTRATION; PERINEURAL at 15:04

## 2023-03-20 RX ADMIN — HYDROCODONE BITARTRATE AND ACETAMINOPHEN 1 TABLET: 5; 325 TABLET ORAL at 22:51

## 2023-03-20 RX ADMIN — FENTANYL CITRATE 100 MCG: 50 INJECTION, SOLUTION INTRAMUSCULAR; INTRAVENOUS at 14:52

## 2023-03-20 RX ADMIN — METOCLOPRAMIDE 10 MG: 10 TABLET ORAL at 21:03

## 2023-03-20 RX ADMIN — SODIUM CHLORIDE 500 ML: 9 INJECTION, SOLUTION INTRAVENOUS at 11:08

## 2023-03-20 RX ADMIN — INSULIN GLARGINE 47 UNITS: 100 INJECTION, SOLUTION SUBCUTANEOUS at 21:02

## 2023-03-20 RX ADMIN — Medication 10 ML: at 21:14

## 2023-03-20 RX ADMIN — HYDROCODONE BITARTRATE AND ACETAMINOPHEN 1 TABLET: 5; 325 TABLET ORAL at 18:21

## 2023-03-20 NOTE — ED NOTES
"Patient reports the he went to see his kidney doctor this morning and while in the office he was told that his \"kidney levels\" were up. He was told to come to the ER.  "

## 2023-03-20 NOTE — PLAN OF CARE
Goal Outcome Evaluation:  Plan of Care Reviewed With: patient, spouse        Progress: no change  Outcome Evaluation: pt had tunnel hd cath placed in IR this shift; will be going for 1st hd treatment today

## 2023-03-20 NOTE — CONSULTS
[facility]                                                                                             Nephrology Initial Consult Note    Patient Identification:  Name: Gerry Torres MRN: [id number]  Age: 72 y.o. : 1950  Sex: male  Date:3/20/2023    Requesting Physician: As per consult order.  Reason for Consultation: Progressive CKD with edema  Information from:patient/ family/ chart      History of Present Illness: This is a 72 y.o. year old male with stage IV chronic kidney disease followed in our office by Dr. Jame Alaniz.  History of pancreatic cancer status post Whipple procedure last year, renal mass status post partial nephrectomy and progressive CKD over the last 12 to 24 months.  His most recent creatinine was 4.1 and his lisinopril and HCTZ were held.  Arrangements are being made as an outpatient to place an AV fistula but his appointment is not until later this month to see vascular.  He was at his oncologist earlier today, labs were drawn that showed worsening renal failure, azotemia with hyperkalemia.  Patient's main complaint today is feeling swollen, some mild shortness of breath especially with exertion.  No chest pain.  Does describe some uremic symptoms today with nausea and decreased appetite.      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  (HCC) [E10.49]    • Type 1 diabetes mellitus with hyperglycemia (HCC) [E10.65]        Past Medical History:  Past Medical History:   Diagnosis Date   • Diabetes mellitus (HCC)    • Hyperlipidemia    • Hypertension    • Sleep apnea        Past Surgical History:  Past Surgical History:   Procedure Laterality Date   • HERNIA REPAIR  1962   • KNEE ARTHROSCOPY Left 1979   • SPLENECTOMY     • TONSILLECTOMY  1974   • VITRECTOMY PARS PLANA Right 10/22/2019    Procedure: 25G VITRECTOMY-ENDOLASER;  Surgeon: Lazarus, Howard S., MD;  Location: [facility] MAIN OR;  Service: Ophthalmology        Home Meds:   Medications Prior to Admission   Medication Sig Dispense Refill Last Dose   • amLODIPine (NORVASC) 10 MG tablet Take 1 tablet by mouth Daily.   3/19/2023   • calcitriol (ROCALTROL) 0.5 MCG capsule Take 1 capsule by mouth 3 (Three) Times a Week.   3/17/2023   • Cholecalciferol (VITAMIN D3) 2000 units tablet Take 1 tablet by mouth Daily.   3/19/2023   • cyanocobalamin 1000 MCG/ML injection Inject 1 mL into the appropriate muscle as directed by prescriber Every 30 (Thirty) Days.   3/19/2023   • doxazosin (CARDURA) 8 MG tablet Take 1 tablet by mouth 2 (Two) Times a Day.   3/19/2023   • furosemide (LASIX) 40 MG tablet Take 1 tablet by mouth Daily.   3/19/2023   • hydrALAZINE (APRESOLINE) 100 MG tablet Take 1 tablet by mouth 3 (Three) Times a Day.   3/19/2023   • ibandronate (BONIVA) 150 MG tablet Take 1 tablet by mouth Every 30 (Thirty) Days.   Past Week   • Insulin Glargine, 2 Unit Dial, (Toujeo Max SoloStar) 300 UNIT/ML solution pen-injector injection Inject 47-57 Units under the skin into the appropriate area as directed Daily.   3/19/2023   • Insulin Lispro, 1 Unit Dial, (HumaLOG KwikPen) 100 UNIT/ML solution pen-injector Inject 9 Units under the skin into the appropriate area as directed Every Morning.   3/19/2023   • Insulin Lispro, 1 Unit Dial, (HumaLOG KwikPen) 100 UNIT/ML solution pen-injector Inject 9 Units under the skin  into the appropriate area as directed Daily With Lunch.   3/19/2023   • Insulin Lispro, 1 Unit Dial, (HumaLOG KwikPen) 100 UNIT/ML solution pen-injector Inject 13 Units under the skin into the appropriate area as directed Daily With Dinner.   3/19/2023   • levocetirizine (XYZAL) 5 MG tablet Take 1 tablet by mouth Every Evening.   3/19/2023   • lisinopril-hydrochlorothiazide (PRINZIDE,ZESTORETIC) 20-25 MG per tablet Take 1 tablet by mouth Daily.   3/19/2023   • LUMIGAN 0.01 % ophthalmic drops Administer 1 drop to both eyes Every Night. Instill 1 drop into both eyes daily at bedtime  2 3/19/2023   • metoclopramide (REGLAN) 10 MG tablet Take 1 tablet by mouth 2 (Two) Times a Day. Morning and lunch   3/19/2023   • metoprolol tartrate (LOPRESSOR) 25 MG tablet Take 1 tablet by mouth 2 (Two) Times a Day.   3/19/2023   • Multiple Vitamins-Minerals (GWENDOLYN MULTIVITAMIN FOR MEN) tablet Take 1 tablet by mouth Daily. Take one daily   3/19/2023   • tamsulosin (FLOMAX) 0.4 MG capsule 24 hr capsule Take 2 capsules by mouth every night at bedtime.   3/19/2023   • vitamin C (ASCORBIC ACID) 500 MG tablet Take 1 tablet by mouth Daily.   3/19/2023       Current Meds:   Current Facility-Administered Medications   Medication Dose Route Frequency Provider Last Rate Last Admin   • acetaminophen (TYLENOL) tablet 650 mg  650 mg Oral Q4H PRN Keiry Riddle APRN        Or   • acetaminophen (TYLENOL) 160 MG/5ML solution 650 mg  650 mg Oral Q4H PRN Keiry Riddle APRN        Or   • acetaminophen (TYLENOL) suppository 650 mg  650 mg Rectal Q4H PRN Keiry Riddle APRN       • calcitriol (ROCALTROL) capsule 0.5 mcg  0.5 mcg Oral Once per day on Mon Wed Fri Keiry Riddle APRN       • cetirizine (zyrTEC) tablet 10 mg  10 mg Oral Daily Keiry Riddle APRN       • dextrose (D50W) (25 g/50 mL) IV injection 25 g  25 g Intravenous Q15 Min PRN Keiry Riddle, CURT       • dextrose (GLUTOSE) oral gel 15 g  15 g Oral Q15 Min PRN Keiry Riddle, APRN        • fentaNYL citrate (PF) (SUBLIMAZE) injection   Intravenous PRN Annmarie Madden MD   100 mcg at 03/20/23 1452   • glucagon (human recombinant) (GLUCAGEN DIAGNOSTIC) 1 mg in sterile water (preservative free) 1 mL injection  1 mg Intramuscular Q15 Min PRN Keiry Riddle APRN       • [START ON 3/21/2023] heparin (porcine) 5000 UNIT/ML injection 5,000 Units  5,000 Units Subcutaneous Q8H Keiry Riddle APRN       • HYDROcodone-acetaminophen (NORCO) 5-325 MG per tablet 1 tablet  1 tablet Oral Q4H PRN Keiry Riddle APRN       • insulin glargine (LANTUS, SEMGLEE) injection 47 Units  47 Units Subcutaneous Nightly Keiry Riddle APRN       • Insulin Lispro (1 Unit Dial) (HUMALOG) solution pen-injector 13 Units  13 Units Subcutaneous Daily With Dinner Keiry Riddle APRN       • [START ON 3/21/2023] Insulin Lispro (1 Unit Dial) (HUMALOG) solution pen-injector 9 Units  9 Units Subcutaneous QAM Keiry Riddle APRN       • Insulin Lispro (1 Unit Dial) (HUMALOG) solution pen-injector 9 Units  9 Units Subcutaneous Daily With Lunch Keiry Riddle APRN       • latanoprost (XALATAN) 0.005 % ophthalmic solution 1 drop  1 drop Both Eyes Nightly Keiry Riddle APRN       • lidocaine (XYLOCAINE) 1 % injection   Infiltration PRN Annmarie Madden MD   10 mL at 03/20/23 1504   • metoclopramide (REGLAN) tablet 10 mg  10 mg Oral BID Keiry Riddle APRN       • midazolam (VERSED) injection   Intravenous PRN Annmarie Madden MD   1 mg at 03/20/23 1453   • nitroglycerin (NITROSTAT) SL tablet 0.4 mg  0.4 mg Sublingual Q5 Min PRN Keiry Riddle APRN       • ondansetron (ZOFRAN) tablet 4 mg  4 mg Oral Q6H PRN Keiry Riddle APRN        Or   • ondansetron (ZOFRAN) injection 4 mg  4 mg Intravenous Q6H PRN Keiry Riddle APRN       • sodium chloride 0.9 % flush 10 mL  10 mL Intravenous PRN Keiry Riddle APRN       • sodium chloride 0.9 % flush 10 mL  10 mL Intravenous Q12H Keiry Riddle APRN       • sodium chloride 0.9 % flush 10  "mL  10 mL Intravenous PRN Keiry Riddle APRN       • sodium chloride 0.9 % infusion 40 mL  40 mL Intravenous PRN Keiry Riddle APRN           Allergies:  No Known Allergies    Social History:   Social History     Socioeconomic History   • Marital status:    Tobacco Use   • Smoking status: Former     Types: Cigarettes     Quit date: 2018     Years since quittin.3   Substance and Sexual Activity   • Alcohol use: Yes     Alcohol/week: 4.0 standard drinks     Types: 4 Cans of beer per week   • Drug use: Yes     Frequency: 5.0 times per week     Types: Marijuana   • Sexual activity: Defer        Family History:  No family history on file.     Review of Systems: as per HPI, in addition:    General:      Complains of weakness / fatigue,                       No fevers / chills                       no weight loss  HEENT:       no dysphagia / odynophagia  Neck:           normal range of motion, no swelling  Respiratory: no cough / congestion                      No shortness of air                       No wheezing  CV:              No chest pain                       No palpitations  Abdomen/GI: no nausea / vomiting                      No diarrhea / constipation                      No abdominal pain  :             no dysuria / urinary frequency                       No urgency, normal output  Endocrine:   no polyuria / polydipsia,                      No heat or cold intolerance  Skin:           no rashes or skin breakdown   Vascular:   + Edema                     No claudication  Psych:        no depression/ anxiety  Neuro:        no focal weakness, no seizures  Musculoskeletal: no joint pain or deformities      Physical Exam:  Vitals:   Temp (24hrs), Av.8 °F (36 °C), Min:96.8 °F (36 °C), Max:96.8 °F (36 °C)    BP 97/42   Pulse 50   Temp 96.8 °F (36 °C) (Oral)   Resp 10   Ht 182.9 cm (72\")   Wt 135 kg (296 lb 8.3 oz)   SpO2 94% Comment: 3lpm/nc  BMI 40.22 kg/m²   Intake/Output: "     Intake/Output Summary (Last 24 hours) at 3/20/2023 1518  Last data filed at 3/20/2023 1138  Gross per 24 hour   Intake 500 ml   Output --   Net 500 ml        Wt Readings from Last 1 Encounters:   03/20/23 0933 135 kg (296 lb 8.3 oz)       Exam:    General Appearance:  Awake, alert, oriented x3, no acute distress  Obese, chronically ill-appearing   Head and Face:  Normocephalic, atraumatic, mucus membranes moist, oropharynx clear   Eyes:  No icterus, pupils equal round and reactive to light, extraocular movements intact    ENMT: Moist mucosa, tongue symmetric    Neck: Supple  no jugular venous distention  no thyromegaly   Pulmonary:  Respiratory effort: Normal  Auscultation of lungs: Clear bilaterally  No wheezes  No rhonchi  Good air movement, good expansion   Chest wall:  No tenderness or deformity   Cardiovascular:  Auscultation of the heart: Normal rhythm, no murmurs  2+ edema of bilateral lower extremities   Abdomen:  Abdomen: soft, non-tender, normal bowel sounds all four quadrants, no masses   Liver and spleen: no hepatosplenomegaly   Musculoskeletal: Digits and nails: normal  Normal range of motion  No joint swelling or gross deformities    Skin: Skin inspection: color normal, no visible rashes or lesions  Skin palpation: texture, turgor normal, no palpable lesions   Lymphatic:  no cervical lymphadenopathy    Psychiatric: Judgement and insight: normal  Orientation to person place and time: normal  Mood and affect: normal       DATA:  Radiology and Labs:  The following labs independently reviewed by me, additional AM labs ordered  Old records independently reviewed showing progressive CKD now ESRD  The following radiologic studies independently viewed by me, findings chest x-ray showed borderline heart size with vascular congestion  Interval notes, chart personally reviewed by me.  I have reviewed and summarized old records as detailed above  Plan of care discussed with patient and his wife at bedside  New  problems include progressive CKD, now ESRD with hyperkalemia and metabolic acidosis and volume overload    Dialysis patient access: Nothing current    Risk/ complexity of medical care/ medical decision making: High risk, need for vascular access placement surgically and dialysis initiation  Chronic illness with severe exacerbation or progression: CKD      Labs:   Recent Results (from the past 24 hour(s))   ECG 12 Lead Drug Monitoring    Collection Time: 03/20/23  9:57 AM   Result Value Ref Range    QT Interval 466 ms   Comprehensive Metabolic Panel    Collection Time: 03/20/23 10:05 AM    Specimen: Arm, Left; Blood   Result Value Ref Range    Glucose 167 (H) 65 - 99 mg/dL     (H) 8 - 23 mg/dL    Creatinine 6.89 (H) 0.76 - 1.27 mg/dL    Sodium 140 136 - 145 mmol/L    Potassium 5.7 (H) 3.5 - 5.2 mmol/L    Chloride 107 98 - 107 mmol/L    CO2 14.0 (L) 22.0 - 29.0 mmol/L    Calcium 7.8 (L) 8.6 - 10.5 mg/dL    Total Protein 6.1 6.0 - 8.5 g/dL    Albumin 3.9 3.5 - 5.2 g/dL    ALT (SGPT) 12 1 - 41 U/L    AST (SGOT) 10 1 - 40 U/L    Alkaline Phosphatase 100 39 - 117 U/L    Total Bilirubin 0.3 0.0 - 1.2 mg/dL    Globulin 2.2 gm/dL    A/G Ratio 1.8 g/dL    BUN/Creatinine Ratio 16.0 7.0 - 25.0    Anion Gap 19.0 (H) 5.0 - 15.0 mmol/L    eGFR 7.9 (L) >60.0 mL/min/1.73   CBC Auto Differential    Collection Time: 03/20/23 10:05 AM    Specimen: Arm, Left; Blood   Result Value Ref Range    WBC 9.80 3.40 - 10.80 10*3/mm3    RBC 2.99 (L) 4.14 - 5.80 10*6/mm3    Hemoglobin 8.8 (L) 13.0 - 17.7 g/dL    Hematocrit 28.2 (L) 37.5 - 51.0 %    MCV 94.3 79.0 - 97.0 fL    MCH 29.4 26.6 - 33.0 pg    MCHC 31.2 (L) 31.5 - 35.7 g/dL    RDW 14.8 12.3 - 15.4 %    RDW-SD 52.1 37.0 - 54.0 fl    MPV 10.8 6.0 - 12.0 fL    Platelets 164 140 - 450 10*3/mm3    Neutrophil % 74.5 42.7 - 76.0 %    Lymphocyte % 12.0 (L) 19.6 - 45.3 %    Monocyte % 11.1 5.0 - 12.0 %    Eosinophil % 1.8 0.3 - 6.2 %    Basophil % 0.6 0.0 - 1.5 %    Neutrophils, Absolute 7.30  (H) 1.70 - 7.00 10*3/mm3    Lymphocytes, Absolute 1.20 0.70 - 3.10 10*3/mm3    Monocytes, Absolute 1.10 (H) 0.10 - 0.90 10*3/mm3    Eosinophils, Absolute 0.20 0.00 - 0.40 10*3/mm3    Basophils, Absolute 0.10 0.00 - 0.20 10*3/mm3    nRBC 0.2 0.0 - 0.2 /100 WBC   Green Top (Gel)    Collection Time: 03/20/23 10:05 AM   Result Value Ref Range    Extra Tube Hold for add-ons.    Lavender Top    Collection Time: 03/20/23 10:05 AM   Result Value Ref Range    Extra Tube hold for add-on    Gold Top - SST    Collection Time: 03/20/23 10:05 AM   Result Value Ref Range    Extra Tube Hold for add-ons.    Light Blue Top    Collection Time: 03/20/23 10:05 AM   Result Value Ref Range    Extra Tube Hold for add-ons.    Lipase    Collection Time: 03/20/23 10:05 AM    Specimen: Arm, Left; Blood   Result Value Ref Range    Lipase 7 (L) 13 - 60 U/L   BNP    Collection Time: 03/20/23 10:05 AM    Specimen: Arm, Left; Blood   Result Value Ref Range    proBNP 3,192.0 (H) 0.0 - 900.0 pg/mL   Protime-INR    Collection Time: 03/20/23 10:05 AM    Specimen: Arm, Left; Blood   Result Value Ref Range    Protime 10.9 9.6 - 11.7 Seconds    INR 1.06 0.93 - 1.10   aPTT    Collection Time: 03/20/23 10:05 AM    Specimen: Arm, Left; Blood   Result Value Ref Range    PTT 29.0 24.0 - 31.0 seconds   Hepatitis B Surface Antigen    Collection Time: 03/20/23 10:05 AM    Specimen: Arm, Left; Blood   Result Value Ref Range    Hepatitis B Surface Ag Non-Reactive Non-Reactive   Urinalysis With Culture If Indicated - Urine, Clean Catch    Collection Time: 03/20/23 12:33 PM    Specimen: Urine, Clean Catch   Result Value Ref Range    Color, UA Yellow Yellow, Straw    Appearance, UA Slightly Cloudy (A) Clear    pH, UA <=5.0 5.0 - 8.0    Specific Gravity, UA 1.018 1.005 - 1.030    Glucose, UA Negative Negative    Ketones, UA Trace (A) Negative    Bilirubin, UA Negative Negative    Blood, UA Negative Negative    Protein, UA >=300 mg/dL (3+) (A) Negative    Leuk Esterase,  UA Moderate (2+) (A) Negative    Nitrite, UA Negative Negative    Urobilinogen, UA 0.2 E.U./dL 0.2 - 1.0 E.U./dL   Urinalysis, Microscopic Only - Urine, Clean Catch    Collection Time: 03/20/23 12:33 PM    Specimen: Urine, Clean Catch   Result Value Ref Range    RBC, UA None Seen None Seen /HPF    WBC, UA 6-12 (A) None Seen /HPF    Bacteria, UA Trace (A) None Seen /HPF    Squamous Epithelial Cells, UA None Seen None Seen, 0-2 /HPF    Hyaline Casts, UA None Seen None Seen /LPF    Granular Casts, UA 0-2 None Seen /LPF    Methodology Manual Light Microscopy    POC Glucose Once    Collection Time: 03/20/23 12:37 PM    Specimen: Blood   Result Value Ref Range    Glucose 139 (H) 70 - 105 mg/dL   POC Glucose Once    Collection Time: 03/20/23  2:24 PM    Specimen: Blood   Result Value Ref Range    Glucose 123 (H) 70 - 105 mg/dL       Radiology:  Imaging Results (Last 24 Hours)     Procedure Component Value Units Date/Time    IR Insert Tunneled CV Catheter Without Port 5 Plus [[id number]] Resulted: 03/20/23 1432     Updated: 03/20/23 1432    XR Chest 1 View [[id number]] Collected: 03/20/23 1144     Updated: 03/20/23 1148    Narrative:      XR CHEST 1 VW    Date of Exam: 3/20/2023 11:01 AM EDT    Indication: wheezing.    Comparison: 4/18/2022    Findings:  Heart size is at the upper limits of normal. Proximal pulmonary arteries are enlarged suggesting changes of pulmonary arterial hypertension. There is minimal left basilar atelectasis. Lungs are otherwise clear. No definite pleural effusion or   pneumothorax.      Impression:      Impression:    1. Borderline heart size with enlarged main pulmonary artery segments suggesting changes of pulmonary arterial hypertension.  2. No focal airspace consolidation.    Electronically Signed: Grover Mccabe    3/20/2023 11:46 AM EDT    Workstation ID: [id number]               ASSESSMENT:   CKD stage IV with progression to ESRD  Worsening azotemia with uremia  Edema/volume overload  Metabolic  acidosis  Hyperkalemia  Anemia of CKD  Proteinuria    Acute kidney injury superimposed on CKD (HCC)  Atrophic left kidney  Renal cell carcinoma status post partial left nephrectomy  Pancreatic cancer status post Whipple  Hypertension  Diabetes mellitus type 2  Secondary hyperparathyroidism      DISCUSSION/PLAN:   Discussed with Dr. Alaniz.  Patient has likely progressed to end-stage renal disease and will need dialysis initiation  Indications for dialysis include uremia, volume overload, hyperkalemia and acidosis  Consult IR for tunneled catheter placement  Has an appointment later this month for AV fistula mapping with vascular  Start Epogen for anemia of CKD  Check phosphorus and add binders if levels elevated  Check iron studies  Will need outpatient dialysis arranged in [place]    Continue to monitor electrolytes and volume closely    I appreciate the consult request, please call if any questions      Garrett King M.D  [facility]  Office phone number: [phone]  Answering service phone number: [phone]      3/20/2023        Dictation via Dragon dictation software

## 2023-03-20 NOTE — ED NOTES
Provider informed about patient's blood pressure being somewhat low before giving pain medication. Provider gave the verbal to give pain medicine.

## 2023-03-20 NOTE — H&P
[facility] Medicine Services  History & Physical    Patient Name: Gerry Torres  : 1950  MRN: [id number]  Primary Care Physician:  Mary Le MD  Date of admission: 3/20/2023  Date and Time of Service: 3/20/2023 at 1330    Subjective      Chief Complaint: Back pain, shortness of breath, weight gain    History of Present Illness: Gerry Torres is a 72 y.o. male with CKD, hypertension, diabetes mellitus type 1, GREGG, neuroendocrine tumor, pancreatic cancer with splenectomy, partial pancreatitis, and 10% of left kidney removal who presented to [facility] on 3/20/2023 complaining of increased shortness of breath over the last 3 months in addition to 25 pound weight gain.  Patient states that his shortness of breath has progressively continued to increase, he was able to walk around his block 3 months ago and now is no longer to do so, states that he can walk to his mailbox and back without difficulty.  Patient states that he began having back pain the last few days, he was at his oncology office today and had labs drawn that demonstrated acute renal failure and presented to the emergency department based on those labs.  Patient states that he was anticipating having to go on dialysis at some point this year, he does not currently have a fistula in place, states he was due to be evaluated for fistula within the next few weeks.    His laboratory studies are positive for BNP of 3192, potassium 5.7, , creatinine 6.8, hemoglobin 8.8, UA with moderate leukocytes, 6-12 WBC, and trace bacteria with culture pending (patient denies dysuria), no significant abnormality on chest x-ray.    Personal History     Past Medical History:   Diagnosis Date   • Diabetes mellitus (HCC)    • Hyperlipidemia    • Hypertension    • Sleep apnea        Past Surgical History:   Procedure Laterality Date   • HERNIA REPAIR     • KNEE ARTHROSCOPY Left    • SPLENECTOMY     • TONSILLECTOMY      • VITRECTOMY PARS PLANA Right 10/22/2019    Procedure: 25G VITRECTOMY-ENDOLASER;  Surgeon: Lazarus, Howard S., MD;  Location: [facility] MAIN OR;  Service: Ophthalmology       Family History: family history is not on file. Otherwise pertinent FHx was reviewed and not pertinent to current issue.    Social History:  reports that he quit smoking about 4 years ago. His smoking use included cigarettes. He does not have any smokeless tobacco history on file. He reports current alcohol use of about 4.0 standard drinks per week. He reports current drug use. Frequency: 5.00 times per week. Drug: Marijuana.    Home Medications:  Prior to Admission Medications     Prescriptions Last Dose Informant Patient Reported? Taking?    amLODIPine (NORVASC) 10 MG tablet 3/19/2023  Yes Yes    Take 1 tablet by mouth Daily.    Cholecalciferol (VITAMIN D3) 2000 units tablet 3/19/2023  Yes Yes    Take 1 tablet by mouth Daily.    Insulin Glargine, 2 Unit Dial, (Toujeo Max SoloStar) 300 UNIT/ML solution pen-injector injection 3/19/2023  Yes Yes    Inject 47-57 Units under the skin into the appropriate area as directed Daily.    Insulin Lispro, 1 Unit Dial, (HumaLOG KwikPen) 100 UNIT/ML solution pen-injector 3/19/2023  Yes Yes    Inject 9 Units under the skin into the appropriate area as directed Every Morning.    Insulin Lispro, 1 Unit Dial, (HumaLOG KwikPen) 100 UNIT/ML solution pen-injector 3/19/2023  Yes Yes    Inject 9 Units under the skin into the appropriate area as directed Daily With Lunch.    Insulin Lispro, 1 Unit Dial, (HumaLOG KwikPen) 100 UNIT/ML solution pen-injector 3/19/2023  Yes Yes    Inject 13 Units under the skin into the appropriate area as directed Daily With Dinner.    LUMIGAN 0.01 % ophthalmic drops 3/19/2023  Yes Yes    Administer 1 drop to both eyes Every Night. Instill 1 drop into both eyes daily at bedtime    metoprolol tartrate (LOPRESSOR) 25 MG tablet 3/19/2023  Yes Yes    Take 1 tablet by mouth 2 (Two) Times a Day.     Multiple Vitamins-Minerals (GWENDOLYN MULTIVITAMIN FOR MEN) tablet 3/19/2023  Yes Yes    Take 1 tablet by mouth Daily. Take one daily    tamsulosin (FLOMAX) 0.4 MG capsule 24 hr capsule 3/19/2023  Yes Yes    Take 2 capsules by mouth every night at bedtime.    vitamin C (ASCORBIC ACID) 250 MG tablet   Yes No    Take 1,000 mg by mouth Daily.        ROS: Denies fever, positive shortness of breath, negative chest pain, negative syncope, increased edema bilateral lower extremities    Allergies:  No Known Allergies    Objective      Vitals:   Temp:  [96.8 °F (36 °C)] 96.8 °F (36 °C)  Heart Rate:  [50-64] 58  Resp:  [14-20] 14  BP: ()/(39-52) 117/48        Result Review    Result Review:  I have personally reviewed the results from the time of this admission to 3/20/2023 14:53 EDT and agree with these findings:  [x]  Laboratory  [x]  Microbiology  [x]  Radiology  [x]  EKG/Telemetry   []  Cardiology/Vascular   []  Pathology  []  Old records  []  Other:  Most notable findings include: See history of present illness    Physical exam  Constitutional: Alert and oriented  Neck: Palpable carotid pulses, no masses, no bruit  Cardiac: Normal S1-S2 no murmur, gallop, or rub, 1+ bilateral lower extremity edema  Pulmonary: Lung sounds clear, normal effort  Skin: Warm and dry without erythema or rash    Assessment & Plan        Active Hospital Problems:  Active Hospital Problems    Diagnosis    • **Acute kidney injury superimposed on CKD (HCC)      Plan:   ILIANA on CKD  -Renal consulted  -Tunnel cath placement this afternoon for HD initiation    Shortness of breath, elevated BNP, lower extremity edema  -Add serial troponins  -check echocardiogram  -Check EKG    Hypotension, history HTN  -Hold hypertensive medications    Back pain, CVA tenderness  -Follow UA culture    Normocytic anemia    DM 1   -Resume home insulin therapy    GREGG  -Home CPAP      DVT prophylaxis:  Medical and mechanical DVT prophylaxis orders are present.    CODE  STATUS:    Code Status (Patient has no pulse and is not breathing): CPR (Attempt to Resuscitate)  Medical Interventions (Patient has pulse or is breathing): Full Support    Admission Status:  I believe this patient meets inpatient status.    I discussed the patient's findings and my recommendations with patient and family.    This patient has been examined wearing appropriate Personal Protective Equipment and discussed with Bedside nurse. 03/20/23      Signature: Electronically signed by CURT Garibay, 03/20/23, 14:53 EDT.  [facility] Hospitalist Team

## 2023-03-20 NOTE — ED PROVIDER NOTES
Subjective   History of Present Illness  Patient is a 72-year-old white male who presents to the ER with complaints of 25 pound weight gain and back pain over the last few days.  He has a past medical history of a neuroendocrine tumor, type II DM, and pancreatic cancer, for which they removed his spleen, 10% of his left kidney, and half of his pancreas.  Patient admits to tenderness over the CVA area to the back.  He admits to bilateral lower extremity edema, which has acutely worsened. he denies any urinary symptoms such as dysuria, frequency, hesitancy, retention, or hematuria.  Patient denies shortness of air or chest pain.        Review of Systems   Constitutional: Negative for chills, fatigue and fever.   HENT: Negative for congestion, tinnitus and trouble swallowing.    Eyes: Negative for photophobia, discharge and redness.   Respiratory: Negative for cough and shortness of breath.    Cardiovascular: Negative for chest pain and palpitations.   Gastrointestinal: Negative for abdominal pain, diarrhea, nausea and vomiting.   Genitourinary: Positive for flank pain. Negative for dysuria, frequency and urgency.   Musculoskeletal: Positive for back pain. Negative for joint swelling and myalgias.   Skin: Negative for rash.   Neurological: Negative for dizziness and headaches.   Psychiatric/Behavioral: Negative for confusion.   All other systems reviewed and are negative.      Past Medical History:   Diagnosis Date   • Diabetes mellitus (CMS/HCC)    • Hyperlipidemia    • Hypertension    • Sleep apnea        No Known Allergies    Past Surgical History:   Procedure Laterality Date   • HERNIA REPAIR  1962   • KNEE ARTHROSCOPY Left 1979   • TONSILLECTOMY  1974   • VITRECTOMY PARS PLANA Right 10/22/2019    Procedure: 25G VITRECTOMY-ENDOLASER;  Surgeon: Lazarus, Howard S., MD;  [facility];  Service: Ophthalmology       No family history on file.    Social History     Socioeconomic History   • Marital status:     Tobacco Use   • Smoking status: Former     Types: Cigarettes     Quit date: 2018     Years since quittin.3   Substance and Sexual Activity   • Alcohol use: Yes     Alcohol/week: 4.0 standard drinks     Types: 4 Cans of beer per week   • Drug use: Yes     Frequency: 5.0 times per week     Types: Marijuana   • Sexual activity: Defer           Objective   Physical Exam  Vitals reviewed.   Constitutional:       General: He is not in acute distress.     Appearance: Normal appearance. He is well-developed. He is obese. He is not toxic-appearing.   HENT:      Head: Normocephalic and atraumatic.   Eyes:      Conjunctiva/sclera: Conjunctivae normal.      Pupils: Pupils are equal, round, and reactive to light.   Cardiovascular:      Rate and Rhythm: Normal rate and regular rhythm.      Pulses: Normal pulses.      Heart sounds: Normal heart sounds.   Pulmonary:      Effort: Pulmonary effort is normal.      Breath sounds: Normal breath sounds.   Abdominal:      General: Bowel sounds are normal. There is distension.      Palpations: Abdomen is soft.      Tenderness: There is no abdominal tenderness.   Musculoskeletal:         General: Swelling present. Normal range of motion.      Cervical back: Normal range of motion and neck supple.   Skin:     General: Skin is warm and dry.      Capillary Refill: Capillary refill takes 2 to 3 seconds.      Findings: No rash.   Neurological:      General: No focal deficit present.      Mental Status: He is alert and oriented to person, place, and time. Mental status is at baseline.      GCS: GCS eye subscore is 4. GCS verbal subscore is 5. GCS motor subscore is 6.      Motor: Motor function is intact.      Coordination: Coordination is intact.   Psychiatric:         Attention and Perception: Attention normal.         Mood and Affect: Mood is depressed. Affect is flat.         Speech: Speech normal.         Behavior: Behavior normal. Behavior is cooperative.         Cognition  "and Memory: Cognition and memory normal.         Procedures       EKG was reviewed by myself and interpreted as sinus rhythm with a rate of 60 no ectopy no ST elevation no previous for comparison read by Dr. Brewer    ED Course  ED Course as of 03/20/23 1250   Mon Mar 20, 2023   1214 Spoke with Dr. Vasquez who states he had talked to Dr. Cook earlier today about this patient and feels that he needs dialysis and states that he will contact IR for tunnel catheter placement [KW]   1246 It was discussed with Lizzie the nurse practitioner who admitted the patient for Dr. Salcido [KW]      ED Course User Index  [KW] Juli Ortiz, APRN    /43   Pulse 54   Temp 96.8 °F (36 °C) (Oral)   Resp 20   Ht 182.9 cm (72\")   Wt 135 kg (296 lb 8.3 oz)   SpO2 95%   BMI 40.22 kg/m²   Labs Reviewed   COMPREHENSIVE METABOLIC PANEL - Abnormal; Notable for the following components:       Result Value    Glucose 167 (*)      (*)     Creatinine 6.89 (*)     Potassium 5.7 (*)     CO2 14.0 (*)     Calcium 7.8 (*)     Anion Gap 19.0 (*)     eGFR 7.9 (*)     All other components within normal limits    Narrative:     GFR Normal >60  Chronic Kidney Disease <60  Kidney Failure <15    The GFR formula is only valid for adults with stable renal function between ages 18 and 70.   CBC WITH AUTO DIFFERENTIAL - Abnormal; Notable for the following components:    RBC 2.99 (*)     Hemoglobin 8.8 (*)     Hematocrit 28.2 (*)     MCHC 31.2 (*)     Lymphocyte % 12.0 (*)     Neutrophils, Absolute 7.30 (*)     Monocytes, Absolute 1.10 (*)     All other components within normal limits   LIPASE - Abnormal; Notable for the following components:    Lipase 7 (*)     All other components within normal limits   URINALYSIS W/ CULTURE IF INDICATED - Abnormal; Notable for the following components:    Appearance, UA Slightly Cloudy (*)     Ketones, UA Trace (*)     Protein, UA >=300 mg/dL (3+) (*)     Leuk Esterase, UA Moderate (2+) (*)     All other " components within normal limits    Narrative:     In absence of clinical symptoms, the presence of pyuria, bacteria, and/or nitrites on the urinalysis result does not correlate with infection.   BNP (IN-HOUSE) - Abnormal; Notable for the following components:    proBNP 3,192.0 (*)     All other components within normal limits    Narrative:     Among patients with dyspnea, NT-proBNP is highly sensitive for the detection of acute congestive heart failure. In addition NT-proBNP of <300 pg/ml effectively rules out acute congestive heart failure with 99% negative predictive value.    Results may be falsely decreased if patient taking Biotin.     POCT GLUCOSE FINGERSTICK - Abnormal; Notable for the following components:    Glucose 139 (*)     All other components within normal limits   RAINBOW DRAW    Narrative:     The following orders were created for panel order [place] Draw.  Procedure                               Abnormality         Status                     ---------                               -----------         ------                     Green Top (Gel)[[id number]]                                  Final result               Lavender Top[[id number]]                                     Final result               Gold Top - SST[[id number]]                                   Final result               Light Blue Top[[id number]]                                   Final result                 Please view results for these tests on the individual orders.   PROTIME-INR   APTT   HEPATITIS B SURFACE ANTIGEN   URINALYSIS, MICROSCOPIC ONLY   CBC AND DIFFERENTIAL    Narrative:     The following orders were created for panel order CBC & Differential.  Procedure                               Abnormality         Status                     ---------                               -----------         ------                     CBC Auto Differential[[id number]]        Abnormal            Final result                 Please view results  for these tests on the individual orders.   GREEN TOP   LAVENDER TOP   GOLD TOP - SST   LIGHT BLUE TOP     Medications   sodium chloride 0.9 % flush 10 mL (has no administration in time range)   sodium chloride 0.9 % bolus 500 mL (500 mL Intravenous New Bag 3/20/23 1241)   morphine injection 4 mg (4 mg Intravenous Given 3/20/23 1048)   ondansetron (ZOFRAN) injection 4 mg (4 mg Intravenous Given 3/20/23 1048)   sodium chloride 0.9 % bolus 500 mL (500 mL Intravenous New Bag 3/20/23 1108)   HYDROmorphone (DILAUDID) injection 1 mg (1 mg Intravenous Given 3/20/23 1241)     XR Chest 1 View    Result Date: 3/20/2023  Impression: 1. Borderline heart size with enlarged main pulmonary artery segments suggesting changes of pulmonary arterial hypertension. 2. No focal airspace consolidation. Electronically Signed: Grover Mccabe  3/20/2023 11:46 AM EDT  Workstation ID: [id number]                                           Medical Decision Making  Patient is a 72-year-old gentleman who presents from the oncology office after having blood work done this morning and found to have elevated potassium and subsequently elevated BUN and creatinine the patient has known kidney disease but has had significant decline and is dehydrated today.  This is worrisome for acute renal failure requiring dialysis as well as hyperkalemia affecting the heart and needing correction.  Had IV established and blood work was obtained today patient's BUN was found to be 110 and creatinine 6.9 this was compared to 9 months ago when his  BUN was 90 and creatinine was 4-the patient also has an elevated potassium today of 5.7 which is directly related to the decreasing kidney function he is also found to be dehydrated with a CO2 of 14 his chest x-ray was interpreted by myself and read by radiology as having no acute findings.  The patient was discussed with the on-call nephrology will be admitted for further evaluation and possible dialysis after tunneled catheter  placement.    Patient was discussed with Lizzie parekh nurse practitioner awaiting admitted to Dr. Buck    Acute kidney injury superimposed on CKD (HCC): complicated acute illness or injury that poses a threat to life or bodily functions  Dehydration, moderate: complicated acute illness or injury  Hyperkalemia, diminished renal excretion: complicated acute illness or injury  Primary pancreatic neuroendocrine tumor: complicated acute illness or injury that poses a threat to life or bodily functions  Amount and/or Complexity of Data Reviewed  External Data Reviewed: notes.     Details: Reviewed old note where the patient saw nephrology on February 13, 2023 and was planning for-graft for dialysis  Labs: ordered. Decision-making details documented in ED Course.  Radiology: ordered and independent interpretation performed. Decision-making details documented in ED Course.  ECG/medicine tests: ordered and independent interpretation performed. Decision-making details documented in ED Course.      Risk  Prescription drug management.  Parenteral controlled substances.  Decision regarding hospitalization.          Final diagnoses:   Acute kidney injury superimposed on CKD (HCC)   Dehydration, moderate   Primary pancreatic neuroendocrine tumor   Hyperkalemia, diminished renal excretion       ED Disposition  ED Disposition     ED Disposition   Decision to Admit    Condition   --    Comment   Level of Care: Telemetry [5]   Admitting Physician: ISRAEL HERNANDEZ [[id number]]   Attending Physician: ISRAEL HERNANDEZ [[id number]]               No follow-up provider specified.       Medication List      No changes were made to your prescriptions during this visit.          Juli Ortiz, APRN  03/20/23 [id number]

## 2023-03-21 ENCOUNTER — APPOINTMENT (OUTPATIENT)
Dept: CARDIOLOGY | Facility: HOSPITAL | Age: 73
DRG: 673 | End: 2023-03-21
Payer: MEDICARE

## 2023-03-21 LAB
ALBUMIN SERPL-MCNC: 3.6 G/DL (ref 3.5–5.2)
ALBUMIN/GLOB SERPL: 1.6 G/DL
ALP SERPL-CCNC: 95 U/L (ref 39–117)
ALT SERPL W P-5'-P-CCNC: 12 U/L (ref 1–41)
ANION GAP SERPL CALCULATED.3IONS-SCNC: 18 MMOL/L (ref 5–15)
AST SERPL-CCNC: 16 U/L (ref 1–40)
BACTERIA SPEC AEROBE CULT: NO GROWTH
BASOPHILS # BLD AUTO: 0.1 10*3/MM3 (ref 0–0.2)
BASOPHILS NFR BLD AUTO: 0.6 % (ref 0–1.5)
BILIRUB SERPL-MCNC: 0.3 MG/DL (ref 0–1.2)
BUN SERPL-MCNC: 90 MG/DL (ref 8–23)
BUN/CREAT SERPL: 14 (ref 7–25)
CALCIUM SPEC-SCNC: 7.7 MG/DL (ref 8.6–10.5)
CHLORIDE SERPL-SCNC: 105 MMOL/L (ref 98–107)
CO2 SERPL-SCNC: 17 MMOL/L (ref 22–29)
CREAT SERPL-MCNC: 6.41 MG/DL (ref 0.76–1.27)
DEPRECATED RDW RBC AUTO: 52.1 FL (ref 37–54)
EGFRCR SERPLBLD CKD-EPI 2021: 8.7 ML/MIN/1.73
EOSINOPHIL # BLD AUTO: 0.1 10*3/MM3 (ref 0–0.4)
EOSINOPHIL NFR BLD AUTO: 1.5 % (ref 0.3–6.2)
ERYTHROCYTE [DISTWIDTH] IN BLOOD BY AUTOMATED COUNT: 14.9 % (ref 12.3–15.4)
FERRITIN SERPL-MCNC: 188 NG/ML (ref 30–400)
GEN 5 2HR TROPONIN T REFLEX: 96 NG/L
GLOBULIN UR ELPH-MCNC: 2.2 GM/DL
GLUCOSE BLDC GLUCOMTR-MCNC: 121 MG/DL (ref 70–105)
GLUCOSE BLDC GLUCOMTR-MCNC: 141 MG/DL (ref 70–105)
GLUCOSE SERPL-MCNC: 136 MG/DL (ref 65–99)
HCT VFR BLD AUTO: 25.6 % (ref 37.5–51)
HGB BLD-MCNC: 8 G/DL (ref 13–17.7)
IRON 24H UR-MRATE: 75 MCG/DL (ref 59–158)
IRON SATN MFR SERPL: 29 % (ref 20–50)
LYMPHOCYTES # BLD AUTO: 0.9 10*3/MM3 (ref 0.7–3.1)
LYMPHOCYTES NFR BLD AUTO: 11 % (ref 19.6–45.3)
MAGNESIUM SERPL-MCNC: 1.7 MG/DL (ref 1.6–2.4)
MCH RBC QN AUTO: 29.4 PG (ref 26.6–33)
MCHC RBC AUTO-ENTMCNC: 31.2 G/DL (ref 31.5–35.7)
MCV RBC AUTO: 94.1 FL (ref 79–97)
MONOCYTES # BLD AUTO: 1.1 10*3/MM3 (ref 0.1–0.9)
MONOCYTES NFR BLD AUTO: 12.7 % (ref 5–12)
NEUTROPHILS NFR BLD AUTO: 6.4 10*3/MM3 (ref 1.7–7)
NEUTROPHILS NFR BLD AUTO: 74.2 % (ref 42.7–76)
NRBC BLD AUTO-RTO: 0.3 /100 WBC (ref 0–0.2)
PHOSPHATE SERPL-MCNC: 9.5 MG/DL (ref 2.5–4.5)
PLATELET # BLD AUTO: 131 10*3/MM3 (ref 140–450)
PMV BLD AUTO: 11.5 FL (ref 6–12)
POTASSIUM SERPL-SCNC: 5 MMOL/L (ref 3.5–5.2)
PROT SERPL-MCNC: 5.8 G/DL (ref 6–8.5)
RBC # BLD AUTO: 2.72 10*6/MM3 (ref 4.14–5.8)
SODIUM SERPL-SCNC: 140 MMOL/L (ref 136–145)
TIBC SERPL-MCNC: 258 MCG/DL (ref 298–536)
TRANSFERRIN SERPL-MCNC: 173 MG/DL (ref 200–360)
TROPONIN T DELTA: 11 NG/L
TROPONIN T SERPL HS-MCNC: 85 NG/L
WBC NRBC COR # BLD: 8.7 10*3/MM3 (ref 3.4–10.8)

## 2023-03-21 PROCEDURE — 25010000002 MORPHINE PER 10 MG: Performed by: INTERNAL MEDICINE

## 2023-03-21 PROCEDURE — 84100 ASSAY OF PHOSPHORUS: CPT | Performed by: INTERNAL MEDICINE

## 2023-03-21 PROCEDURE — 84484 ASSAY OF TROPONIN QUANT: CPT | Performed by: NURSE PRACTITIONER

## 2023-03-21 PROCEDURE — 83036 HEMOGLOBIN GLYCOSYLATED A1C: CPT | Performed by: INTERNAL MEDICINE

## 2023-03-21 PROCEDURE — 82728 ASSAY OF FERRITIN: CPT | Performed by: INTERNAL MEDICINE

## 2023-03-21 PROCEDURE — 80053 COMPREHEN METABOLIC PANEL: CPT | Performed by: INTERNAL MEDICINE

## 2023-03-21 PROCEDURE — 83735 ASSAY OF MAGNESIUM: CPT | Performed by: INTERNAL MEDICINE

## 2023-03-21 PROCEDURE — 25010000002 HEPARIN (PORCINE) PER 1000 UNITS: Performed by: INTERNAL MEDICINE

## 2023-03-21 PROCEDURE — 93306 TTE W/DOPPLER COMPLETE: CPT | Performed by: INTERNAL MEDICINE

## 2023-03-21 PROCEDURE — 63710000001 INSULIN LISPRO (HUMAN) PER 5 UNITS: Performed by: NURSE PRACTITIONER

## 2023-03-21 PROCEDURE — 83540 ASSAY OF IRON: CPT | Performed by: INTERNAL MEDICINE

## 2023-03-21 PROCEDURE — 84466 ASSAY OF TRANSFERRIN: CPT | Performed by: INTERNAL MEDICINE

## 2023-03-21 PROCEDURE — 82962 GLUCOSE BLOOD TEST: CPT

## 2023-03-21 PROCEDURE — 85025 COMPLETE CBC W/AUTO DIFF WBC: CPT | Performed by: INTERNAL MEDICINE

## 2023-03-21 PROCEDURE — 63710000001 INSULIN GLARGINE PER 5 UNITS: Performed by: NURSE PRACTITIONER

## 2023-03-21 PROCEDURE — 84484 ASSAY OF TROPONIN QUANT: CPT | Performed by: INTERNAL MEDICINE

## 2023-03-21 PROCEDURE — 93306 TTE W/DOPPLER COMPLETE: CPT

## 2023-03-21 PROCEDURE — 25010000002 HEPARIN (PORCINE) PER 1000 UNITS: Performed by: NURSE PRACTITIONER

## 2023-03-21 RX ORDER — OXYCODONE HYDROCHLORIDE 5 MG/1
10 TABLET ORAL EVERY 4 HOURS PRN
Status: DISCONTINUED | OUTPATIENT
Start: 2023-03-21 | End: 2023-03-25 | Stop reason: HOSPADM

## 2023-03-21 RX ORDER — MORPHINE SULFATE 2 MG/ML
2 INJECTION, SOLUTION INTRAMUSCULAR; INTRAVENOUS EVERY 4 HOURS PRN
Status: DISCONTINUED | OUTPATIENT
Start: 2023-03-21 | End: 2023-03-25 | Stop reason: HOSPADM

## 2023-03-21 RX ORDER — NALOXONE HCL 0.4 MG/ML
0.4 VIAL (ML) INJECTION
Status: DISCONTINUED | OUTPATIENT
Start: 2023-03-21 | End: 2023-03-25 | Stop reason: HOSPADM

## 2023-03-21 RX ORDER — HEPARIN SODIUM 1000 [USP'U]/ML
5000 INJECTION, SOLUTION INTRAVENOUS; SUBCUTANEOUS AS NEEDED
Status: DISCONTINUED | OUTPATIENT
Start: 2023-03-21 | End: 2023-03-25 | Stop reason: HOSPADM

## 2023-03-21 RX ORDER — HEPARIN SODIUM 1000 [USP'U]/ML
2000 INJECTION, SOLUTION INTRAVENOUS; SUBCUTANEOUS AS NEEDED
Status: DISCONTINUED | OUTPATIENT
Start: 2023-03-21 | End: 2023-03-25 | Stop reason: HOSPADM

## 2023-03-21 RX ORDER — ALBUMIN (HUMAN) 12.5 G/50ML
12.5 SOLUTION INTRAVENOUS
Status: DISCONTINUED | OUTPATIENT
Start: 2023-03-21 | End: 2023-03-25 | Stop reason: HOSPADM

## 2023-03-21 RX ORDER — HEPARIN SODIUM 1000 [USP'U]/ML
4000 INJECTION, SOLUTION INTRAVENOUS; SUBCUTANEOUS AS NEEDED
Status: DISCONTINUED | OUTPATIENT
Start: 2023-03-21 | End: 2023-03-21

## 2023-03-21 RX ADMIN — MORPHINE SULFATE 2 MG: 2 INJECTION, SOLUTION INTRAMUSCULAR; INTRAVENOUS at 21:13

## 2023-03-21 RX ADMIN — HEPARIN SODIUM 5000 UNITS: 5000 INJECTION INTRAVENOUS; SUBCUTANEOUS at 15:44

## 2023-03-21 RX ADMIN — Medication 10 ML: at 08:49

## 2023-03-21 RX ADMIN — OXYCODONE HYDROCHLORIDE 10 MG: 5 TABLET ORAL at 15:44

## 2023-03-21 RX ADMIN — INSULIN LISPRO 13 UNITS: 100 INJECTION, SOLUTION INTRAVENOUS; SUBCUTANEOUS at 17:12

## 2023-03-21 RX ADMIN — METOCLOPRAMIDE 10 MG: 10 TABLET ORAL at 08:49

## 2023-03-21 RX ADMIN — INSULIN LISPRO 9 UNITS: 100 INJECTION, SOLUTION INTRAVENOUS; SUBCUTANEOUS at 08:49

## 2023-03-21 RX ADMIN — HEPARIN SODIUM 5000 UNITS: 5000 INJECTION INTRAVENOUS; SUBCUTANEOUS at 08:52

## 2023-03-21 RX ADMIN — CETIRIZINE HYDROCHLORIDE 10 MG: 10 TABLET, FILM COATED ORAL at 08:49

## 2023-03-21 RX ADMIN — LATANOPROST 1 DROP: 50 SOLUTION/ DROPS OPHTHALMIC at 21:27

## 2023-03-21 RX ADMIN — INSULIN GLARGINE 47 UNITS: 100 INJECTION, SOLUTION SUBCUTANEOUS at 21:13

## 2023-03-21 RX ADMIN — HEPARIN SODIUM 4500 UNITS: 1000 INJECTION INTRAVENOUS; SUBCUTANEOUS at 14:13

## 2023-03-21 RX ADMIN — MORPHINE SULFATE 2 MG: 2 INJECTION, SOLUTION INTRAMUSCULAR; INTRAVENOUS at 17:12

## 2023-03-21 RX ADMIN — Medication 10 ML: at 21:14

## 2023-03-21 RX ADMIN — METOCLOPRAMIDE 10 MG: 10 TABLET ORAL at 21:13

## 2023-03-21 RX ADMIN — HEPARIN SODIUM 5000 UNITS: 5000 INJECTION INTRAVENOUS; SUBCUTANEOUS at 21:13

## 2023-03-21 NOTE — PLAN OF CARE
Goal Outcome Evaluation:   Pt received HD today. Oxycodone added for pain management. VSS. Awaiting for outpatient dialysis centers to approve patient for treatment upon discharge.

## 2023-03-21 NOTE — PROGRESS NOTES
"    [facility] Medicine Services   Daily Progress Note    Patient Name: Gerry Torres  : 1950  MRN: [id number]  Primary Care Physician:  Mary Le MD  Date of admission: 3/20/2023  Date and Time of Service: 3/21/2023 at 0800      Subjective      Chief Complaint: Shortness of breath, abnormal labs    Patient Reports did well with first dialysis treatment yesterday    Subjective:  Symptoms:  No shortness of breath or chest pain.    Diet:  No nausea.    Activity level: Normal.    Pain:  He reports no pain.          Objective      Vitals:   Temp:  [97.2 °F (36.2 °C)-97.8 °F (36.6 °C)] 97.3 °F (36.3 °C)  Heart Rate:  [48-72] 71  Resp:  [9-18] 16  BP: ()/(38-68) 129/68  Objective:  General Appearance:  Comfortable.    Vital signs: (most recent): Blood pressure 129/68, pulse 71, temperature 97.3 °F (36.3 °C), temperature source Oral, resp. rate 16, height 182.9 cm (72\"), weight 135 kg (297 lb 14.4 oz), SpO2 91 %.    Output: Producing urine.    Lungs:  Normal effort and normal respiratory rate.  Breath sounds clear to auscultation.  (Room air)  Heart: Normal rate.  Regular rhythm.  S1 normal and S2 normal.  No murmur.   Abdomen: Abdomen is soft and non-distended.    Extremities: There is dependent edema (Trace pretibial).    Neurological: Patient is alert and oriented to person, place and time.    Skin:  Warm and dry.  (Right-sided tunneled cath in place, no hematoma or drainage noted)                Result Review    Result Review:  I have personally reviewed the results from the time of this admission to 3/21/2023 11:12 EDT and agree with these findings:  [x]  Laboratory  [x]  Microbiology  []  Radiology  [x]  EKG/Telemetry   [x]  Cardiology/Vascular   []  Pathology  []  Old records  []  Other:  Most notable findings include: Elevated troponin, EKG normal sinus rhythm, BUN continues to be elevated at 90, mild thrombocytopenia of 131      Assessment & Plan      Brief Patient " Summary:  Gerry Torres is a 72 y.o. male who was admitted directly from his oncologist office with abnormal labs, underwent tunnel cath placement and first dialysis treatment yesterday.  Work-up for shortness of breath so far negative for cardiac source.      calcitriol, 0.5 mcg, Oral, Once per day on Mon Wed Fri  cetirizine, 10 mg, Oral, Daily  epoetin janes/janes-epbx, 10,000 Units, Subcutaneous, Once per day on Mon Wed Fri  heparin (porcine), 5,000 Units, Subcutaneous, Q8H  insulin glargine, 47 Units, Subcutaneous, Nightly  insulin lispro, 13 Units, Subcutaneous, Daily With Dinner  insulin lispro, 9 Units, Subcutaneous, QAM  insulin lispro, 9 Units, Subcutaneous, Daily With Lunch  latanoprost, 1 drop, Both Eyes, Nightly  metoclopramide, 10 mg, Oral, BID  sodium chloride, 10 mL, Intravenous, Q12H             Active Hospital Problems:  Active Hospital Problems    Diagnosis    • **Acute kidney injury superimposed on CKD (HCC)      Plan:     Progression to ESRD with azotemia, hyperkalemia  -Renal following  -Tunnel cath placement 3/20/2023 for HD initiation, plan for fistula later this month  -follow K+, improved today s/p HD     Shortness of breath, elevated BNP/elevated troponin, lower extremity edema  -Elevated troponin likely due to new onset ESRD, repeat troponin  -echocardiogram pending  -EKG without acute ST changes  -Obtain stress test from [facility]     Hypotension, history HTN  -Continue to hold hypertensive medications for now  -stable off of antihypertensives     Back pain, CVA tenderness  -Follow UA culture, NGTD     Normocytic anemia of CKD  -epogen per renal    Thrombocytopenia  -Probably reactive, continue to trend     DM 1   -Resumed home insulin therapy  -stable    Pancreatic cancer, renal mass s/p whipple procedure/splenectomy partial nephrectomy 2022     GREGG  -Home CPAP      DVT prophylaxis:  Medical and mechanical DVT prophylaxis orders are present.    CODE STATUS:    Code Status  (Patient has no pulse and is not breathing): CPR (Attempt to Resuscitate)  Medical Interventions (Patient has pulse or is breathing): Full Support      Disposition:  I expect patient to be discharged 1 to 2 days depending upon outpatient dialysis center acceptance.    This patient has been examined wearing appropriate Personal Protective Equipment and discussed with Attending MD, bedside nurse, patient. 03/21/23      Electronically signed by CURT Garibay, 03/21/23, 11:12 EDT.  [facility] Hospitalist Team

## 2023-03-21 NOTE — PLAN OF CARE
Goal Outcome Evaluation:         Discussed plan of care with pt. Pt was able to rest most of the night. No major complaints.

## 2023-03-21 NOTE — DISCHARGE PLACEMENT REQUEST
"Yumiko Lundy (72 y.o. Male)     Date of Birth   1950    Social Security Number       Address   [address] [place] [facility]    Home Phone   [phone]    MRN   [id number]       Holiness   Jew    Marital Status                               Admission Date   3/20/23    Admission Type   Emergency    Admitting Provider   Gabriel Casanova MD    Attending Provider   Gabriel Casanova MD    Department, Room/Bed   [facility] 3A MEDICAL INPATIENT, 311/1       Discharge Date       Discharge Disposition       Discharge Destination                               Attending Provider: Gabriel Casanova MD    Allergies: No Known Allergies    Isolation: None   Infection: None   Code Status: CPR    Ht: 182.9 cm (72\")   Wt: 135 kg (297 lb 14.4 oz)    Admission Cmt: None   Principal Problem: Acute kidney injury superimposed on CKD (HCC) [N17.9,N18.9]                 Active Insurance as of 3/20/2023     Primary Coverage     Payor Plan Insurance Group Employer/Plan Group    MEDICARE MEDICARE A & B      Payor Plan Address Payor Plan Phone Number Payor Plan Fax Number Effective Dates    [address] [phone]  6/1/2016 - None Entered    [facility] [id number]       Subscriber Name Subscriber Birth Date Member ID       YUMIKO LUNDY 1950 [id number]           Secondary Coverage     Payor Plan Insurance Group Employer/Plan Group    [employer]HEM BLUE CROSS ANTHEM BLUE CROSS BLUE SHIELD PPO [id number]     Payor Plan Address Payor Plan Phone Number Payor Plan Fax Number Effective Dates    [address] [phone]  1/1/2019 - None Entered    [facility] [id number]       Subscriber Name Subscriber Birth Date Member ID       YUMIKO LUNDY 1950 [id number]                 Emergency Contacts      (Rel.) Home Phone Work Phone Mobile Phone    KAMRONYASMEEN TERRELL (Spouse) [phone] -- --               History & Physical      Keiry Riddle APRN at 03/20/23 1255              [facility] " Hospital Medicine Services  History & Physical    Patient Name: Gerry Torres  : 1950  MRN: [id number]  Primary Care Physician:  Mary Le MD  Date of admission: 3/20/2023  Date and Time of Service: 3/20/2023 at 1330    Subjective       Chief Complaint: Back pain, shortness of breath, weight gain    History of Present Illness: Gerry Torres is a 72 y.o. male with CKD, hypertension, diabetes mellitus type 1, GREGG, neuroendocrine tumor, pancreatic cancer with splenectomy, partial pancreatitis, and 10% of left kidney removal who presented to [facility] on 3/20/2023 complaining of increased shortness of breath over the last 3 months in addition to 25 pound weight gain.  Patient states that his shortness of breath has progressively continued to increase, he was able to walk around his block 3 months ago and now is no longer to do so, states that he can walk to his mailbox and back without difficulty.  Patient states that he began having back pain the last few days, he was at his oncology office today and had labs drawn that demonstrated acute renal failure and presented to the emergency department based on those labs.  Patient states that he was anticipating having to go on dialysis at some point this year, he does not currently have a fistula in place, states he was due to be evaluated for fistula within the next few weeks.    His laboratory studies are positive for BNP of 3192, potassium 5.7, , creatinine 6.8, hemoglobin 8.8, UA with moderate leukocytes, 6-12 WBC, and trace bacteria with culture pending (patient denies dysuria), no significant abnormality on chest x-ray.    Personal History     Past Medical History:   Diagnosis Date   • Diabetes mellitus (HCC)    • Hyperlipidemia    • Hypertension    • Sleep apnea        Past Surgical History:   Procedure Laterality Date   • HERNIA REPAIR     • KNEE ARTHROSCOPY Left    • SPLENECTOMY     • TONSILLECTOMY     • VITRECTOMY  PARS PLANA Right 10/22/2019    Procedure: 25G VITRECTOMY-ENDOLASER;  Surgeon: Lazarus, Howard S., MD;  Location: [facility] MAIN OR;  Service: Ophthalmology       Family History: family history is not on file. Otherwise pertinent FHx was reviewed and not pertinent to current issue.    Social History:  reports that he quit smoking about 4 years ago. His smoking use included cigarettes. He does not have any smokeless tobacco history on file. He reports current alcohol use of about 4.0 standard drinks per week. He reports current drug use. Frequency: 5.00 times per week. Drug: Marijuana.    Home Medications:  Prior to Admission Medications     Prescriptions Last Dose Informant Patient Reported? Taking?    amLODIPine (NORVASC) 10 MG tablet 3/19/2023  Yes Yes    Take 1 tablet by mouth Daily.    Cholecalciferol (VITAMIN D3) 2000 units tablet 3/19/2023  Yes Yes    Take 1 tablet by mouth Daily.    Insulin Glargine, 2 Unit Dial, (Toujeo Max SoloStar) 300 UNIT/ML solution pen-injector injection 3/19/2023  Yes Yes    Inject 47-57 Units under the skin into the appropriate area as directed Daily.    Insulin Lispro, 1 Unit Dial, (HumaLOG KwikPen) 100 UNIT/ML solution pen-injector 3/19/2023  Yes Yes    Inject 9 Units under the skin into the appropriate area as directed Every Morning.    Insulin Lispro, 1 Unit Dial, (HumaLOG KwikPen) 100 UNIT/ML solution pen-injector 3/19/2023  Yes Yes    Inject 9 Units under the skin into the appropriate area as directed Daily With Lunch.    Insulin Lispro, 1 Unit Dial, (HumaLOG KwikPen) 100 UNIT/ML solution pen-injector 3/19/2023  Yes Yes    Inject 13 Units under the skin into the appropriate area as directed Daily With Dinner.    LUMIGAN 0.01 % ophthalmic drops 3/19/2023  Yes Yes    Administer 1 drop to both eyes Every Night. Instill 1 drop into both eyes daily at bedtime    metoprolol tartrate (LOPRESSOR) 25 MG tablet 3/19/2023  Yes Yes    Take 1 tablet by mouth 2 (Two) Times a Day.    Multiple  Vitamins-Minerals (GWENDOLYN MULTIVITAMIN FOR MEN) tablet 3/19/2023  Yes Yes    Take 1 tablet by mouth Daily. Take one daily    tamsulosin (FLOMAX) 0.4 MG capsule 24 hr capsule 3/19/2023  Yes Yes    Take 2 capsules by mouth every night at bedtime.    vitamin C (ASCORBIC ACID) 250 MG tablet   Yes No    Take 1,000 mg by mouth Daily.        ROS: Denies fever, positive shortness of breath, negative chest pain, negative syncope, increased edema bilateral lower extremities    Allergies:  No Known Allergies    Objective       Vitals:   Temp:  [96.8 °F (36 °C)] 96.8 °F (36 °C)  Heart Rate:  [50-64] 58  Resp:  [14-20] 14  BP: ()/(39-52) 117/48        Result Review    Result Review:  I have personally reviewed the results from the time of this admission to 3/20/2023 14:53 EDT and agree with these findings:  [x]  Laboratory  [x]  Microbiology  [x]  Radiology  [x]  EKG/Telemetry   []  Cardiology/Vascular   []  Pathology  []  Old records  []  Other:  Most notable findings include: See history of present illness    Physical exam  Constitutional: Alert and oriented  Neck: Palpable carotid pulses, no masses, no bruit  Cardiac: Normal S1-S2 no murmur, gallop, or rub, 1+ bilateral lower extremity edema  Pulmonary: Lung sounds clear, normal effort  Skin: Warm and dry without erythema or rash    Assessment & Plan        Active Hospital Problems:  Active Hospital Problems    Diagnosis    • **Acute kidney injury superimposed on CKD (HCC)      Plan:   ILIANA on CKD  -Renal consulted  -Tunnel cath placement this afternoon for HD initiation    Shortness of breath, elevated BNP, lower extremity edema  -Add serial troponins  -check echocardiogram  -Check EKG    Hypotension, history HTN  -Hold hypertensive medications    Back pain, CVA tenderness  -Follow UA culture    Normocytic anemia    DM 1   -Resume home insulin therapy    GREGG  -Home CPAP      DVT prophylaxis:  Medical and mechanical DVT prophylaxis orders are present.    CODE STATUS:   "  Code Status (Patient has no pulse and is not breathing): CPR (Attempt to Resuscitate)  Medical Interventions (Patient has pulse or is breathing): Full Support    Admission Status:  I believe this patient meets inpatient status.    I discussed the patient's findings and my recommendations with patient and family.    This patient has been examined wearing appropriate Personal Protective Equipment and discussed with Bedside nurse. 23      Signature: Electronically signed by CURT Garibay, 23, 14:53 EDT.  [facility] Hospitalist Team    Electronically signed by Keiry Riddle APRN at 23 1503            Physician Progress Notes (most recent note)      Keiry Riddle APRN at 23 1112              [facility] Medicine Services   Daily Progress Note    Patient Name: Gerry Torres  : 1950  MRN: [id number]  Primary Care Physician:  Mary Le MD  Date of admission: 3/20/2023  Date and Time of Service: 3/21/2023 at 0800      Subjective       Chief Complaint: Shortness of breath, abnormal labs    Patient Reports did well with first dialysis treatment yesterday    Subjective:  Symptoms:  No shortness of breath or chest pain.    Diet:  No nausea.    Activity level: Normal.    Pain:  He reports no pain.          Objective       Vitals:   Temp:  [97.2 °F (36.2 °C)-97.8 °F (36.6 °C)] 97.3 °F (36.3 °C)  Heart Rate:  [48-72] 71  Resp:  [9-18] 16  BP: ()/(38-68) 129/68  Objective:  General Appearance:  Comfortable.    Vital signs: (most recent): Blood pressure 129/68, pulse 71, temperature 97.3 °F (36.3 °C), temperature source Oral, resp. rate 16, height 182.9 cm (72\"), weight 135 kg (297 lb 14.4 oz), SpO2 91 %.    Output: Producing urine.    Lungs:  Normal effort and normal respiratory rate.  Breath sounds clear to auscultation.  (Room air)  Heart: Normal rate.  Regular rhythm.  S1 normal and S2 normal.  No murmur.   Abdomen: Abdomen is soft and non-distended.  "   Extremities: There is dependent edema (Trace pretibial).    Neurological: Patient is alert and oriented to person, place and time.    Skin:  Warm and dry.  (Right-sided tunneled cath in place, no hematoma or drainage noted)                Result Review    Result Review:  I have personally reviewed the results from the time of this admission to 3/21/2023 11:12 EDT and agree with these findings:  [x]  Laboratory  [x]  Microbiology  []  Radiology  [x]  EKG/Telemetry   [x]  Cardiology/Vascular   []  Pathology  []  Old records  []  Other:  Most notable findings include: Elevated troponin, EKG normal sinus rhythm, BUN continues to be elevated at 90, mild thrombocytopenia of 131      Assessment & Plan      Brief Patient Summary:  Gerry Torres is a 72 y.o. male who was admitted directly from his oncologist office with abnormal labs, underwent tunnel cath placement and first dialysis treatment yesterday.  Work-up for shortness of breath so far negative for cardiac source.      calcitriol, 0.5 mcg, Oral, Once per day on Mon Wed Fri  cetirizine, 10 mg, Oral, Daily  epoetin janes/janes-epbx, 10,000 Units, Subcutaneous, Once per day on Mon Wed Fri  heparin (porcine), 5,000 Units, Subcutaneous, Q8H  insulin glargine, 47 Units, Subcutaneous, Nightly  insulin lispro, 13 Units, Subcutaneous, Daily With Dinner  insulin lispro, 9 Units, Subcutaneous, QAM  insulin lispro, 9 Units, Subcutaneous, Daily With Lunch  latanoprost, 1 drop, Both Eyes, Nightly  metoclopramide, 10 mg, Oral, BID  sodium chloride, 10 mL, Intravenous, Q12H             Active Hospital Problems:  Active Hospital Problems    Diagnosis    • **Acute kidney injury superimposed on CKD (HCC)      Plan:     Progression to ESRD with azotemia, hyperkalemia  -Renal following  -Tunnel cath placement 3/20/2023 for HD initiation, plan for fistula later this month  -follow K+, improved today s/p HD     Shortness of breath, elevated BNP/elevated troponin, lower extremity  edema  -Elevated troponin likely due to new onset ESRD, repeat troponin  -echocardiogram pending  -EKG without acute ST changes  -Obtain stress test from [facility]     Hypotension, history HTN  -Continue to hold hypertensive medications for now  -stable off of antihypertensives     Back pain, CVA tenderness  -Follow UA culture, NGTD     Normocytic anemia of CKD  -epogen per renal    Thrombocytopenia  -Probably reactive, continue to trend     DM 1   -Resumed home insulin therapy  -stable    Pancreatic cancer, renal mass s/p whipple procedure/splenectomy partial nephrectomy      GREGG  -Home CPAP      DVT prophylaxis:  Medical and mechanical DVT prophylaxis orders are present.    CODE STATUS:    Code Status (Patient has no pulse and is not breathing): CPR (Attempt to Resuscitate)  Medical Interventions (Patient has pulse or is breathing): Full Support      Disposition:  I expect patient to be discharged 1 to 2 days depending upon outpatient dialysis center acceptance.    This patient has been examined wearing appropriate Personal Protective Equipment and discussed with Attending MD, bedside nurse, patient. 23      Electronically signed by CURT Garibay, 23, 11:12 EDT.  [facility] Hospitalist Team             Electronically signed by Keiry Riddle APRN at 23 1119          Consult Notes (most recent note)      Garrett King MD at 23 1518      Consult Orders    1. Nephrology (on -call MD unless specified) [[id number]] ordered by Keiry Riddle APRN at 23 1208                                                                                                               [facility]                                                                                             Nephrology Initial Consult Note    Patient Identification:  Name: Gerry Torres MRN: [id number]  Age: 72 y.o. : 1950  Sex: male  Date:3/20/2023    Requesting Physician:  As per consult order.  Reason for Consultation: Progressive CKD with edema  Information from:patient/ family/ chart      History of Present Illness: This is a 72 y.o. year old male with stage IV chronic kidney disease followed in our office by Dr. Jame Alaniz.  History of pancreatic cancer status post Whipple procedure last year, renal mass status post partial nephrectomy and progressive CKD over the last 12 to 24 months.  His most recent creatinine was 4.1 and his lisinopril and HCTZ were held.  Arrangements are being made as an outpatient to place an AV fistula but his appointment is not until later this month to see vascular.  He was at his oncologist earlier today, labs were drawn that showed worsening renal failure, azotemia with hyperkalemia.  Patient's main complaint today is feeling swollen, some mild shortness of breath especially with exertion.  No chest pain.  Does describe some uremic symptoms today with nausea and decreased appetite.      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) [E10.49]    • Type 1 diabetes mellitus with hyperglycemia (HCC) [E10.65]        Past Medical History:  Past Medical History:   Diagnosis Date   • Diabetes mellitus (HCC)    • Hyperlipidemia    • Hypertension    • Sleep apnea        Past Surgical History:  Past Surgical History:   Procedure Laterality Date   • HERNIA REPAIR  1962   • KNEE ARTHROSCOPY Left 1979   • SPLENECTOMY     •  TONSILLECTOMY  1974   • VITRECTOMY PARS PLANA Right 10/22/2019    Procedure: 25G VITRECTOMY-ENDOLASER;  Surgeon: Lazarus, Howard S., MD;  Location: [facility] MAIN OR;  Service: Ophthalmology        Home Meds:   Medications Prior to Admission   Medication Sig Dispense Refill Last Dose   • amLODIPine (NORVASC) 10 MG tablet Take 1 tablet by mouth Daily.   3/19/2023   • calcitriol (ROCALTROL) 0.5 MCG capsule Take 1 capsule by mouth 3 (Three) Times a Week.   3/17/2023   • Cholecalciferol (VITAMIN D3) 2000 units tablet Take 1 tablet by mouth Daily.   3/19/2023   • cyanocobalamin 1000 MCG/ML injection Inject 1 mL into the appropriate muscle as directed by prescriber Every 30 (Thirty) Days.   3/19/2023   • doxazosin (CARDURA) 8 MG tablet Take 1 tablet by mouth 2 (Two) Times a Day.   3/19/2023   • furosemide (LASIX) 40 MG tablet Take 1 tablet by mouth Daily.   3/19/2023   • hydrALAZINE (APRESOLINE) 100 MG tablet Take 1 tablet by mouth 3 (Three) Times a Day.   3/19/2023   • ibandronate (BONIVA) 150 MG tablet Take 1 tablet by mouth Every 30 (Thirty) Days.   Past Week   • Insulin Glargine, 2 Unit Dial, (Toujeo Max SoloStar) 300 UNIT/ML solution pen-injector injection Inject 47-57 Units under the skin into the appropriate area as directed Daily.   3/19/2023   • Insulin Lispro, 1 Unit Dial, (HumaLOG KwikPen) 100 UNIT/ML solution pen-injector Inject 9 Units under the skin into the appropriate area as directed Every Morning.   3/19/2023   • Insulin Lispro, 1 Unit Dial, (HumaLOG KwikPen) 100 UNIT/ML solution pen-injector Inject 9 Units under the skin into the appropriate area as directed Daily With Lunch.   3/19/2023   • Insulin Lispro, 1 Unit Dial, (HumaLOG KwikPen) 100 UNIT/ML solution pen-injector Inject 13 Units under the skin into the appropriate area as directed Daily With Dinner.   3/19/2023   • levocetirizine (XYZAL) 5 MG tablet Take 1 tablet by mouth Every Evening.   3/19/2023   • lisinopril-hydrochlorothiazide  (PRINZIDE,ZESTORETIC) 20-25 MG per tablet Take 1 tablet by mouth Daily.   3/19/2023   • LUMIGAN 0.01 % ophthalmic drops Administer 1 drop to both eyes Every Night. Instill 1 drop into both eyes daily at bedtime  2 3/19/2023   • metoclopramide (REGLAN) 10 MG tablet Take 1 tablet by mouth 2 (Two) Times a Day. Morning and lunch   3/19/2023   • metoprolol tartrate (LOPRESSOR) 25 MG tablet Take 1 tablet by mouth 2 (Two) Times a Day.   3/19/2023   • Multiple Vitamins-Minerals (GWENDOLYN MULTIVITAMIN FOR MEN) tablet Take 1 tablet by mouth Daily. Take one daily   3/19/2023   • tamsulosin (FLOMAX) 0.4 MG capsule 24 hr capsule Take 2 capsules by mouth every night at bedtime.   3/19/2023   • vitamin C (ASCORBIC ACID) 500 MG tablet Take 1 tablet by mouth Daily.   3/19/2023       Current Meds:   Current Facility-Administered Medications   Medication Dose Route Frequency Provider Last Rate Last Admin   • acetaminophen (TYLENOL) tablet 650 mg  650 mg Oral Q4H PRN Keiry Riddle APRN        Or   • acetaminophen (TYLENOL) 160 MG/5ML solution 650 mg  650 mg Oral Q4H PRN Keiry Riddle APRN        Or   • acetaminophen (TYLENOL) suppository 650 mg  650 mg Rectal Q4H PRN Keiry Riddle APRN       • calcitriol (ROCALTROL) capsule 0.5 mcg  0.5 mcg Oral Once per day on Mon Wed Fri Keiry Riddle APRN       • cetirizine (zyrTEC) tablet 10 mg  10 mg Oral Daily Keiry Riddle APRN       • dextrose (D50W) (25 g/50 mL) IV injection 25 g  25 g Intravenous Q15 Min PRN Keiry Riddle APRN       • dextrose (GLUTOSE) oral gel 15 g  15 g Oral Q15 Min PRN Keiry Riddle APRN       • fentaNYL citrate (PF) (SUBLIMAZE) injection   Intravenous PRN Annmarie Madden MD   100 mcg at 03/20/23 1452   • glucagon (human recombinant) (GLUCAGEN DIAGNOSTIC) 1 mg in sterile water (preservative free) 1 mL injection  1 mg Intramuscular Q15 Min PRN Keiry Riddle APRN       • [START ON 3/21/2023] heparin (porcine) 5000 UNIT/ML injection 5,000 Units  5,000 Units  Subcutaneous Q8H Keiry Riddle APRN       • HYDROcodone-acetaminophen (NORCO) 5-325 MG per tablet 1 tablet  1 tablet Oral Q4H PRN Keiry Riddle APRN       • insulin glargine (LANTUS, SEMGLEE) injection 47 Units  47 Units Subcutaneous Nightly Keiry Riddle APRN       • Insulin Lispro (1 Unit Dial) (HUMALOG) solution pen-injector 13 Units  13 Units Subcutaneous Daily With Dinner Keiry Riddle APRN       • [START ON 3/21/2023] Insulin Lispro (1 Unit Dial) (HUMALOG) solution pen-injector 9 Units  9 Units Subcutaneous QAM Keiry Riddle APRN       • Insulin Lispro (1 Unit Dial) (HUMALOG) solution pen-injector 9 Units  9 Units Subcutaneous Daily With Lunch Keiry Riddle APRN       • latanoprost (XALATAN) 0.005 % ophthalmic solution 1 drop  1 drop Both Eyes Nightly Keiry Riddle APRN       • lidocaine (XYLOCAINE) 1 % injection   Infiltration PRN Annmarie Madden MD   10 mL at 03/20/23 1504   • metoclopramide (REGLAN) tablet 10 mg  10 mg Oral BID Keiry Riddle APRN       • midazolam (VERSED) injection   Intravenous PRN Annmarie Madden MD   1 mg at 03/20/23 1453   • nitroglycerin (NITROSTAT) SL tablet 0.4 mg  0.4 mg Sublingual Q5 Min PRN Keiry Riddle APRN       • ondansetron (ZOFRAN) tablet 4 mg  4 mg Oral Q6H PRN Keiry Riddle APRN        Or   • ondansetron (ZOFRAN) injection 4 mg  4 mg Intravenous Q6H PRN Keiry Riddle APRN       • sodium chloride 0.9 % flush 10 mL  10 mL Intravenous PRN Keiry Riddle APRN       • sodium chloride 0.9 % flush 10 mL  10 mL Intravenous Q12H eKiry Riddle APRN       • sodium chloride 0.9 % flush 10 mL  10 mL Intravenous PRN Keiry Riddle APRN       • sodium chloride 0.9 % infusion 40 mL  40 mL Intravenous PRN Keiry Riddle APRN           Allergies:  No Known Allergies    Social History:   Social History     Socioeconomic History   • Marital status:    Tobacco Use   • Smoking status: Former     Types: Cigarettes     Quit date: 12/2018     Years since  "quittin.3   Substance and Sexual Activity   • Alcohol use: Yes     Alcohol/week: 4.0 standard drinks     Types: 4 Cans of beer per week   • Drug use: Yes     Frequency: 5.0 times per week     Types: Marijuana   • Sexual activity: Defer        Family History:  No family history on file.     Review of Systems: as per HPI, in addition:    General:      Complains of weakness / fatigue,                       No fevers / chills                       no weight loss  HEENT:       no dysphagia / odynophagia  Neck:           normal range of motion, no swelling  Respiratory: no cough / congestion                      No shortness of air                       No wheezing  CV:              No chest pain                       No palpitations  Abdomen/GI: no nausea / vomiting                      No diarrhea / constipation                      No abdominal pain  :             no dysuria / urinary frequency                       No urgency, normal output  Endocrine:   no polyuria / polydipsia,                      No heat or cold intolerance  Skin:           no rashes or skin breakdown   Vascular:   + Edema                     No claudication  Psych:        no depression/ anxiety  Neuro:        no focal weakness, no seizures  Musculoskeletal: no joint pain or deformities      Physical Exam:  Vitals:   Temp (24hrs), Av.8 °F (36 °C), Min:96.8 °F (36 °C), Max:96.8 °F (36 °C)    BP 97/42   Pulse 50   Temp 96.8 °F (36 °C) (Oral)   Resp 10   Ht 182.9 cm (72\")   Wt 135 kg (296 lb 8.3 oz)   SpO2 94% Comment: 3lpm/nc  BMI 40.22 kg/m²   Intake/Output:     Intake/Output Summary (Last 24 hours) at 3/20/2023 1518  Last data filed at 3/20/2023 1138  Gross per 24 hour   Intake 500 ml   Output --   Net 500 ml        Wt Readings from Last 1 Encounters:   23 0933 135 kg (296 lb 8.3 oz)       Exam:    General Appearance:  Awake, alert, oriented x3, no acute distress  Obese, chronically ill-appearing   Head and Face:  " Normocephalic, atraumatic, mucus membranes moist, oropharynx clear   Eyes:  No icterus, pupils equal round and reactive to light, extraocular movements intact    ENMT: Moist mucosa, tongue symmetric    Neck: Supple  no jugular venous distention  no thyromegaly   Pulmonary:  Respiratory effort: Normal  Auscultation of lungs: Clear bilaterally  No wheezes  No rhonchi  Good air movement, good expansion   Chest wall:  No tenderness or deformity   Cardiovascular:  Auscultation of the heart: Normal rhythm, no murmurs  2+ edema of bilateral lower extremities   Abdomen:  Abdomen: soft, non-tender, normal bowel sounds all four quadrants, no masses   Liver and spleen: no hepatosplenomegaly   Musculoskeletal: Digits and nails: normal  Normal range of motion  No joint swelling or gross deformities    Skin: Skin inspection: color normal, no visible rashes or lesions  Skin palpation: texture, turgor normal, no palpable lesions   Lymphatic:  no cervical lymphadenopathy    Psychiatric: Judgement and insight: normal  Orientation to person place and time: normal  Mood and affect: normal       DATA:  Radiology and Labs:  The following labs independently reviewed by me, additional AM labs ordered  Old records independently reviewed showing progressive CKD now ESRD  The following radiologic studies independently viewed by me, findings chest x-ray showed borderline heart size with vascular congestion  Interval notes, chart personally reviewed by me.  I have reviewed and summarized old records as detailed above  Plan of care discussed with patient and his wife at bedside  New problems include progressive CKD, now ESRD with hyperkalemia and metabolic acidosis and volume overload    Dialysis patient access: Nothing current    Risk/ complexity of medical care/ medical decision making: High risk, need for vascular access placement surgically and dialysis initiation  Chronic illness with severe exacerbation or progression: CKD      Labs:    Recent Results (from the past 24 hour(s))   ECG 12 Lead Drug Monitoring    Collection Time: 03/20/23  9:57 AM   Result Value Ref Range    QT Interval 466 ms   Comprehensive Metabolic Panel    Collection Time: 03/20/23 10:05 AM    Specimen: Arm, Left; Blood   Result Value Ref Range    Glucose 167 (H) 65 - 99 mg/dL     (H) 8 - 23 mg/dL    Creatinine 6.89 (H) 0.76 - 1.27 mg/dL    Sodium 140 136 - 145 mmol/L    Potassium 5.7 (H) 3.5 - 5.2 mmol/L    Chloride 107 98 - 107 mmol/L    CO2 14.0 (L) 22.0 - 29.0 mmol/L    Calcium 7.8 (L) 8.6 - 10.5 mg/dL    Total Protein 6.1 6.0 - 8.5 g/dL    Albumin 3.9 3.5 - 5.2 g/dL    ALT (SGPT) 12 1 - 41 U/L    AST (SGOT) 10 1 - 40 U/L    Alkaline Phosphatase 100 39 - 117 U/L    Total Bilirubin 0.3 0.0 - 1.2 mg/dL    Globulin 2.2 gm/dL    A/G Ratio 1.8 g/dL    BUN/Creatinine Ratio 16.0 7.0 - 25.0    Anion Gap 19.0 (H) 5.0 - 15.0 mmol/L    eGFR 7.9 (L) >60.0 mL/min/1.73   CBC Auto Differential    Collection Time: 03/20/23 10:05 AM    Specimen: Arm, Left; Blood   Result Value Ref Range    WBC 9.80 3.40 - 10.80 10*3/mm3    RBC 2.99 (L) 4.14 - 5.80 10*6/mm3    Hemoglobin 8.8 (L) 13.0 - 17.7 g/dL    Hematocrit 28.2 (L) 37.5 - 51.0 %    MCV 94.3 79.0 - 97.0 fL    MCH 29.4 26.6 - 33.0 pg    MCHC 31.2 (L) 31.5 - 35.7 g/dL    RDW 14.8 12.3 - 15.4 %    RDW-SD 52.1 37.0 - 54.0 fl    MPV 10.8 6.0 - 12.0 fL    Platelets 164 140 - 450 10*3/mm3    Neutrophil % 74.5 42.7 - 76.0 %    Lymphocyte % 12.0 (L) 19.6 - 45.3 %    Monocyte % 11.1 5.0 - 12.0 %    Eosinophil % 1.8 0.3 - 6.2 %    Basophil % 0.6 0.0 - 1.5 %    Neutrophils, Absolute 7.30 (H) 1.70 - 7.00 10*3/mm3    Lymphocytes, Absolute 1.20 0.70 - 3.10 10*3/mm3    Monocytes, Absolute 1.10 (H) 0.10 - 0.90 10*3/mm3    Eosinophils, Absolute 0.20 0.00 - 0.40 10*3/mm3    Basophils, Absolute 0.10 0.00 - 0.20 10*3/mm3    nRBC 0.2 0.0 - 0.2 /100 WBC   Green Top (Gel)    Collection Time: 03/20/23 10:05 AM   Result Value Ref Range    Extra Tube Hold for  add-ons.    Lavender Top    Collection Time: 03/20/23 10:05 AM   Result Value Ref Range    Extra Tube hold for add-on    Gold Top - SST    Collection Time: 03/20/23 10:05 AM   Result Value Ref Range    Extra Tube Hold for add-ons.    Light Blue Top    Collection Time: 03/20/23 10:05 AM   Result Value Ref Range    Extra Tube Hold for add-ons.    Lipase    Collection Time: 03/20/23 10:05 AM    Specimen: Arm, Left; Blood   Result Value Ref Range    Lipase 7 (L) 13 - 60 U/L   BNP    Collection Time: 03/20/23 10:05 AM    Specimen: Arm, Left; Blood   Result Value Ref Range    proBNP 3,192.0 (H) 0.0 - 900.0 pg/mL   Protime-INR    Collection Time: 03/20/23 10:05 AM    Specimen: Arm, Left; Blood   Result Value Ref Range    Protime 10.9 9.6 - 11.7 Seconds    INR 1.06 0.93 - 1.10   aPTT    Collection Time: 03/20/23 10:05 AM    Specimen: Arm, Left; Blood   Result Value Ref Range    PTT 29.0 24.0 - 31.0 seconds   Hepatitis B Surface Antigen    Collection Time: 03/20/23 10:05 AM    Specimen: Arm, Left; Blood   Result Value Ref Range    Hepatitis B Surface Ag Non-Reactive Non-Reactive   Urinalysis With Culture If Indicated - Urine, Clean Catch    Collection Time: 03/20/23 12:33 PM    Specimen: Urine, Clean Catch   Result Value Ref Range    Color, UA Yellow Yellow, Straw    Appearance, UA Slightly Cloudy (A) Clear    pH, UA <=5.0 5.0 - 8.0    Specific Gravity, UA 1.018 1.005 - 1.030    Glucose, UA Negative Negative    Ketones, UA Trace (A) Negative    Bilirubin, UA Negative Negative    Blood, UA Negative Negative    Protein, UA >=300 mg/dL (3+) (A) Negative    Leuk Esterase, UA Moderate (2+) (A) Negative    Nitrite, UA Negative Negative    Urobilinogen, UA 0.2 E.U./dL 0.2 - 1.0 E.U./dL   Urinalysis, Microscopic Only - Urine, Clean Catch    Collection Time: 03/20/23 12:33 PM    Specimen: Urine, Clean Catch   Result Value Ref Range    RBC, UA None Seen None Seen /HPF    WBC, UA 6-12 (A) None Seen /HPF    Bacteria, UA Trace (A) None  Seen /HPF    Squamous Epithelial Cells, UA None Seen None Seen, 0-2 /HPF    Hyaline Casts, UA None Seen None Seen /LPF    Granular Casts, UA 0-2 None Seen /LPF    Methodology Manual Light Microscopy    POC Glucose Once    Collection Time: 03/20/23 12:37 PM    Specimen: Blood   Result Value Ref Range    Glucose 139 (H) 70 - 105 mg/dL   POC Glucose Once    Collection Time: 03/20/23  2:24 PM    Specimen: Blood   Result Value Ref Range    Glucose 123 (H) 70 - 105 mg/dL       Radiology:  Imaging Results (Last 24 Hours)     Procedure Component Value Units Date/Time    IR Insert Tunneled CV Catheter Without Port 5 Plus [[id number]] Resulted: 03/20/23 1432     Updated: 03/20/23 1432    XR Chest 1 View [[id number]] Collected: 03/20/23 1144     Updated: 03/20/23 1148    Narrative:      XR CHEST 1 VW    Date of Exam: 3/20/2023 11:01 AM EDT    Indication: wheezing.    Comparison: 4/18/2022    Findings:  Heart size is at the upper limits of normal. Proximal pulmonary arteries are enlarged suggesting changes of pulmonary arterial hypertension. There is minimal left basilar atelectasis. Lungs are otherwise clear. No definite pleural effusion or   pneumothorax.      Impression:      Impression:    1. Borderline heart size with enlarged main pulmonary artery segments suggesting changes of pulmonary arterial hypertension.  2. No focal airspace consolidation.    Electronically Signed: Grover Mccabe    3/20/2023 11:46 AM EDT    Workstation ID: [id number]               ASSESSMENT:   CKD stage IV with progression to ESRD  Worsening azotemia with uremia  Edema/volume overload  Metabolic acidosis  Hyperkalemia  Anemia of CKD  Proteinuria    Acute kidney injury superimposed on CKD (HCC)  Atrophic left kidney  Renal cell carcinoma status post partial left nephrectomy  Pancreatic cancer status post Whipple  Hypertension  Diabetes mellitus type 2  Secondary hyperparathyroidism      DISCUSSION/PLAN:   Discussed with Dr. Alaniz.  Patient has likely  progressed to end-stage renal disease and will need dialysis initiation  Indications for dialysis include uremia, volume overload, hyperkalemia and acidosis  Consult IR for tunneled catheter placement  Has an appointment later this month for AV fistula mapping with vascular  Start Epogen for anemia of CKD  Check phosphorus and add binders if levels elevated  Check iron studies  Will need outpatient dialysis arranged in [place]    Continue to monitor electrolytes and volume closely    I appreciate the consult request, please call if any questions      Garrett King M.D  [facility]  Office phone number: [phone]  Answering service phone number: [phone]      3/20/2023        Dictation via Dragon dictation software      Electronically signed by Garrett King MD at 23 3527       Physical Therapy Notes (most recent note)    No notes exist for this encounter.         Occupational Therapy Notes (most recent note)    No notes exist for this encounter.         Speech Language Pathology Notes (most recent note)    No notes exist for this encounter.         Respiratory Therapy Notes (most recent note)    No notes exist for this encounter.       [facility] MEDICAL INPATIENT  [address] [place] [address]  Phone:  [phone]  Fax:  [phone]        Patient:     Gerry Brownz MRN:  [id number]   [address] [place] [address] :  1950  SSN:    Phone: [phone] Sex:  M      INSURANCE PAYOR PLAN GROUP # SUBSCRIBER ID   Primary:  Secondary:    MEDICARE ANTHEM BLUE CROSS [id number]  [id number]    [id number]  [id number]   Admitting Diagnosis: Dehydration, moderate [E86.0]  Order Date:  Mar 20, 2023        Hemodialysis Inpatient       (Order ID: [id number])     Diagnosis:         Priority:  Routine Expected Date:   Expiration Date:        Interval:  Once Count:    Comments: Heparin 2000 Unit IV bolus and may lock CVC with  heparin  Albumin 12.5 g every hour as needed for systolic blood pressure less than 100     If systolic blood pressure drops below 100 decrease ultrafiltration goal by 500 mLs  If within 30 minutes systolic blood pressure remains below 100 decrease by another 500mL  Duration of Treatment: 3.0 Hours  Access Site: Tunneled Dialysis Catheter  Dialyzer: F160  DFR: 500 mL/min  Dialysate Temperature (C): 36  BFR-As tolerated to a maximum of: 300 mL/min  Dialysate Solution Bath: K+ = 2 mEq, Ca = 2.5mEq  Bicarb: 35 mEq  Na+: 135 mEq  Fluid Removal: 3 L  Notify [facility] Department of order. Who did you speak with? nazario     Specimen Type:   Specimen Source:   Specimen Taken Date:   Specimen Taken Time:                  Authorizing Provider:Garrett King MD  Authorizing Provider's NPI: [id number]  Order Entered By: Garrett King MD 3/20/2023  3:26 PM     Electronically signed by: Garrett King MD 3/20/2023  3:26 PM

## 2023-03-21 NOTE — CASE MANAGEMENT/SOCIAL WORK
Discharge Planning Assessment   Roly     Patient Name: Gerry Torres  MRN: [id number]  Today's Date: 3/21/2023    Admit Date: 3/20/2023    Plan: Home with spouse. New OP [id number] ([facility]).   Discharge Needs Assessment     Row Name 03/21/23 1545       Living Environment    People in Home spouse    Current Living Arrangements home    Primary Care Provided by self    Provides Primary Care For no one    Family Caregiver if Needed spouse    Family Caregiver Names Wife-Ying    Quality of Family Relationships helpful;involved;supportive    Able to Return to Prior Arrangements yes       Resource/Environmental Concerns    Resource/Environmental Concerns none    Transportation Concerns none       Transition Planning    Patient/Family Anticipates Transition to home with family    Patient/Family Anticipated Services at Transition none    Transportation Anticipated family or friend will provide       Discharge Needs Assessment    Readmission Within the Last 30 Days no previous admission in last 30 days    Equipment Currently Used at Home cpap    Concerns to be Addressed discharge planning    Anticipated Changes Related to Illness none    Equipment Needed After Discharge none    Outpatient/Agency/Support Group Needs outpatient hemodialysis               Discharge Plan     Row Name 03/21/23 1546       Plan    Plan Home with spouse. New OP [id number] ([facility]).    Patient/Family in Agreement with Plan yes    Plan Comments Patient lives at home with spouse. Patient drives, spouse will provide transportation at discharge. Patient performs ADL independently. PCP and pharmacy confirmed. Denies financial assistance needs for medication and/or food. Denies HH and/or rehab needs post discharge. Patient is a new OP HD with Dr. Alaniz. Patient scheduled via [employer] portal for HD MWF, start time 7:05 am in [place], [place]. Patient first chair time scheduled for 3/24 at 6:35 am. CM provided appointment  confirmation form to patient, patient verbalized understanding.               Demographic Summary     Row Name 03/21/23 1545       General Information    Admission Type inpatient    Arrived From emergency department    Required Notices Provided Important Message from Medicare    Referral Source admission list    Reason for Consult discharge planning    Preferred Language English               Functional Status     Row Name 03/21/23 1545       Functional Status    Usual Activity Tolerance good    Current Activity Tolerance good       Functional Status, IADL    Medications independent    Meal Preparation independent    Housekeeping independent    Laundry independent    Shopping independent            Met with patient in room wearing PPE: mask.    Maintained distance greater than six feet and spent less than 15 minutes in the room.          Salud Perry RN

## 2023-03-21 NOTE — NURSING NOTE
Hemodialysis tx completed as ordered. Tolerated well. Net fluid loss 2000 mL. Line care per protocol; each lumen locked with heparin and clamped. Verbal report given to primary RN Xin.    Post /80 HR 71

## 2023-03-21 NOTE — PROGRESS NOTES
[facility]     Progress Note    Patient Name: Gerry Torres  : 1950  MRN: [id number]  Primary Care Physician:  Mary Le MD  Date of admission: 3/20/2023    Subjective   Subjective     Chief Complaint: ESRD    History of Present Illness  Patient with ckd IV now progressed to ESRD. Initiating hd    Review of Systems    Objective   Objective     Vitals:   Temp:  [96.8 °F (36 °C)-97.8 °F (36.6 °C)] 97.3 °F (36.3 °C)  Heart Rate:  [48-72] 71  Resp:  [9-20] 16  BP: ()/(38-68) 129/68    Physical Exam   General Appearance:  In no acute distress.    HEENT: Normal HEENT exam.     Extremities: .  There is no deformity, effusion or dependent edema.    Neurological: Patient is alert     Result Review    Result Review:  I have personally reviewed the results from the time of this admission to 3/21/2023 08:09 EDT and agree with these findings:  []  Laboratory list / accordion  []  Microbiology  []  Radiology  []  EKG/Telemetry   []  Cardiology/Vascular   []  Pathology  []  Old records  []  Other:        Assessment & Plan   Assessment / Plan     Brief Patient Summary:  Gerry Torres is a 72 y.o. male who has ckd IV now progressed to esrd    Active Hospital Problems:  Active Hospital Problems    Diagnosis    • **Acute kidney injury superimposed on CKD (HCC)      Plan:   CKD stage IV with progression to ESRD  Worsening azotemia with uremia  Edema/volume overload  Metabolic acidosis  Hyperkalemia  Anemia of CKD  Proteinuria    Acute kidney injury superimposed on CKD (HCC)  Atrophic left kidney  Renal cell carcinoma status post partial left nephrectomy  Pancreatic cancer status post Whipple  Hypertension  Diabetes mellitus type 2  Secondary hyperparathyroidism    .patient seen and examined. Awake, alert. No complaints. S/p tunnel hd cath and initiation of hd. Tolerated well.  Orders for hd today noted. Patient will need outpatient hd spot    Cee Plummer MD

## 2023-03-22 LAB
ALBUMIN SERPL-MCNC: 3.8 G/DL (ref 3.5–5.2)
ALBUMIN/GLOB SERPL: 1.5 G/DL
ALP SERPL-CCNC: 100 U/L (ref 39–117)
ALT SERPL W P-5'-P-CCNC: 12 U/L (ref 1–41)
ANION GAP SERPL CALCULATED.3IONS-SCNC: 15 MMOL/L (ref 5–15)
AST SERPL-CCNC: 21 U/L (ref 1–40)
BASOPHILS # BLD AUTO: 0 10*3/MM3 (ref 0–0.2)
BASOPHILS NFR BLD AUTO: 0.3 % (ref 0–1.5)
BH CV ECHO MEAS - AO MAX PG: 9.4 MMHG
BH CV ECHO MEAS - AO MEAN PG: 5 MMHG
BH CV ECHO MEAS - AO ROOT DIAM: 3.7 CM
BH CV ECHO MEAS - AO V2 MAX: 153 CM/SEC
BH CV ECHO MEAS - AO V2 VTI: 27 CM
BH CV ECHO MEAS - AVA(I,D): 3.7 CM2
BH CV ECHO MEAS - EDV(CUBED): 216 ML
BH CV ECHO MEAS - EDV(MOD-SP4): 152 ML
BH CV ECHO MEAS - EF(MOD-BP): 57 %
BH CV ECHO MEAS - ESV(CUBED): 68.9 ML
BH CV ECHO MEAS - ESV(MOD-SP4): 62.5 ML
BH CV ECHO MEAS - FS: 31.7 %
BH CV ECHO MEAS - IVS/LVPW: 0.92 CM
BH CV ECHO MEAS - IVSD: 1.1 CM
BH CV ECHO MEAS - LA DIMENSION: 5.5 CM
BH CV ECHO MEAS - LAT PEAK E' VEL: 13.8 CM/SEC
BH CV ECHO MEAS - LV MASS(C)D: 296.6 GRAMS
BH CV ECHO MEAS - LV MAX PG: 7.5 MMHG
BH CV ECHO MEAS - LV MEAN PG: 4 MMHG
BH CV ECHO MEAS - LV V1 MAX: 137 CM/SEC
BH CV ECHO MEAS - LV V1 VTI: 26.6 CM
BH CV ECHO MEAS - LVIDD: 6 CM
BH CV ECHO MEAS - LVIDS: 4.1 CM
BH CV ECHO MEAS - LVOT AREA: 3.8 CM2
BH CV ECHO MEAS - LVOT DIAM: 2.2 CM
BH CV ECHO MEAS - LVPWD: 1.2 CM
BH CV ECHO MEAS - MED PEAK E' VEL: 12.9 CM/SEC
BH CV ECHO MEAS - MR MAX PG: 116.2 MMHG
BH CV ECHO MEAS - MR MAX VEL: 539 CM/SEC
BH CV ECHO MEAS - MV A MAX VEL: 122 CM/SEC
BH CV ECHO MEAS - MV DEC SLOPE: 775 CM/SEC2
BH CV ECHO MEAS - MV DEC TIME: 0.18 MSEC
BH CV ECHO MEAS - MV E MAX VEL: 184.5 CM/SEC
BH CV ECHO MEAS - MV E/A: 1.51
BH CV ECHO MEAS - MV MAX PG: 13.1 MMHG
BH CV ECHO MEAS - MV MEAN PG: 5 MMHG
BH CV ECHO MEAS - MV P1/2T: 72.2 MSEC
BH CV ECHO MEAS - MV V2 VTI: 44.1 CM
BH CV ECHO MEAS - MVA(P1/2T): 3 CM2
BH CV ECHO MEAS - MVA(VTI): 2.29 CM2
BH CV ECHO MEAS - PA V2 MAX: 94.5 CM/SEC
BH CV ECHO MEAS - PI END-D VEL: 92.3 CM/SEC
BH CV ECHO MEAS - PULM A REVS DUR: 0.1 SEC
BH CV ECHO MEAS - PULM A REVS VEL: 30.9 CM/SEC
BH CV ECHO MEAS - PULM DIAS VEL: 93.8 CM/SEC
BH CV ECHO MEAS - PULM S/D: 1.11
BH CV ECHO MEAS - PULM SYS VEL: 104 CM/SEC
BH CV ECHO MEAS - RAP SYSTOLE: 8 MMHG
BH CV ECHO MEAS - RV MAX PG: 1.29 MMHG
BH CV ECHO MEAS - RV V1 MAX: 56.7 CM/SEC
BH CV ECHO MEAS - RV V1 VTI: 13.2 CM
BH CV ECHO MEAS - RVDD: 3.5 CM
BH CV ECHO MEAS - SV(LVOT): 101.1 ML
BH CV ECHO MEAS - SV(MOD-SP4): 89.5 ML
BH CV ECHO MEAS - TAPSE (>1.6): 2.36 CM
BH CV ECHO MEASUREMENTS AVERAGE E/E' RATIO: 13.82
BH CV XLRA - TDI S': 17.5 CM/SEC
BILIRUB SERPL-MCNC: 0.3 MG/DL (ref 0–1.2)
BUN SERPL-MCNC: 63 MG/DL (ref 8–23)
BUN/CREAT SERPL: 12.6 (ref 7–25)
CALCIUM SPEC-SCNC: 7.8 MG/DL (ref 8.6–10.5)
CHLORIDE SERPL-SCNC: 99 MMOL/L (ref 98–107)
CO2 SERPL-SCNC: 21 MMOL/L (ref 22–29)
CREAT SERPL-MCNC: 5.01 MG/DL (ref 0.76–1.27)
DEPRECATED RDW RBC AUTO: 49.9 FL (ref 37–54)
EGFRCR SERPLBLD CKD-EPI 2021: 11.6 ML/MIN/1.73
EOSINOPHIL # BLD AUTO: 0.2 10*3/MM3 (ref 0–0.4)
EOSINOPHIL NFR BLD AUTO: 1.6 % (ref 0.3–6.2)
ERYTHROCYTE [DISTWIDTH] IN BLOOD BY AUTOMATED COUNT: 14.5 % (ref 12.3–15.4)
GLOBULIN UR ELPH-MCNC: 2.5 GM/DL
GLUCOSE BLDC GLUCOMTR-MCNC: 101 MG/DL (ref 70–105)
GLUCOSE BLDC GLUCOMTR-MCNC: 106 MG/DL (ref 70–105)
GLUCOSE BLDC GLUCOMTR-MCNC: 119 MG/DL (ref 70–105)
GLUCOSE BLDC GLUCOMTR-MCNC: 12 MG/DL (ref 70–105)
GLUCOSE BLDC GLUCOMTR-MCNC: 122 MG/DL (ref 70–105)
GLUCOSE BLDC GLUCOMTR-MCNC: 129 MG/DL (ref 70–105)
GLUCOSE BLDC GLUCOMTR-MCNC: 32 MG/DL (ref 70–105)
GLUCOSE BLDC GLUCOMTR-MCNC: 37 MG/DL (ref 70–105)
GLUCOSE BLDC GLUCOMTR-MCNC: 40 MG/DL (ref 70–105)
GLUCOSE BLDC GLUCOMTR-MCNC: 52 MG/DL (ref 70–105)
GLUCOSE BLDC GLUCOMTR-MCNC: 79 MG/DL (ref 70–105)
GLUCOSE BLDC GLUCOMTR-MCNC: 87 MG/DL (ref 70–105)
GLUCOSE BLDC GLUCOMTR-MCNC: 87 MG/DL (ref 70–105)
GLUCOSE BLDC GLUCOMTR-MCNC: 88 MG/DL (ref 70–105)
GLUCOSE BLDC GLUCOMTR-MCNC: 88 MG/DL (ref 70–105)
GLUCOSE BLDC GLUCOMTR-MCNC: 94 MG/DL (ref 70–105)
GLUCOSE SERPL-MCNC: 68 MG/DL (ref 65–99)
HBA1C MFR BLD: 6.4 % (ref 4.8–5.6)
HBA1C MFR BLD: 6.9 % (ref 4.8–5.6)
HBV CORE AB SERPL QL IA: NEGATIVE
HCT VFR BLD AUTO: 25.4 % (ref 37.5–51)
HGB BLD-MCNC: 8 G/DL (ref 13–17.7)
LYMPHOCYTES # BLD AUTO: 1.1 10*3/MM3 (ref 0.7–3.1)
LYMPHOCYTES NFR BLD AUTO: 11 % (ref 19.6–45.3)
MAGNESIUM SERPL-MCNC: 1.6 MG/DL (ref 1.6–2.4)
MAXIMAL PREDICTED HEART RATE: 149 BPM
MCH RBC QN AUTO: 29.5 PG (ref 26.6–33)
MCHC RBC AUTO-ENTMCNC: 31.6 G/DL (ref 31.5–35.7)
MCV RBC AUTO: 93.3 FL (ref 79–97)
MONOCYTES # BLD AUTO: 1.5 10*3/MM3 (ref 0.1–0.9)
MONOCYTES NFR BLD AUTO: 15.2 % (ref 5–12)
NEUTROPHILS NFR BLD AUTO: 7 10*3/MM3 (ref 1.7–7)
NEUTROPHILS NFR BLD AUTO: 71.9 % (ref 42.7–76)
NRBC BLD AUTO-RTO: 0.3 /100 WBC (ref 0–0.2)
PHOSPHATE SERPL-MCNC: 6.6 MG/DL (ref 2.5–4.5)
PLATELET # BLD AUTO: 126 10*3/MM3 (ref 140–450)
PMV BLD AUTO: 11.2 FL (ref 6–12)
POTASSIUM SERPL-SCNC: 4.6 MMOL/L (ref 3.5–5.2)
PROT SERPL-MCNC: 6.3 G/DL (ref 6–8.5)
RBC # BLD AUTO: 2.73 10*6/MM3 (ref 4.14–5.8)
SINUS: 3.3 CM
SODIUM SERPL-SCNC: 135 MMOL/L (ref 136–145)
STRESS TARGET HR: 127 BPM
WBC NRBC COR # BLD: 9.8 10*3/MM3 (ref 3.4–10.8)

## 2023-03-22 PROCEDURE — 94799 UNLISTED PULMONARY SVC/PX: CPT

## 2023-03-22 PROCEDURE — 99222 1ST HOSP IP/OBS MODERATE 55: CPT | Performed by: INTERNAL MEDICINE

## 2023-03-22 PROCEDURE — 25010000002 HEPARIN (PORCINE) PER 1000 UNITS: Performed by: NURSE PRACTITIONER

## 2023-03-22 PROCEDURE — 80053 COMPREHEN METABOLIC PANEL: CPT | Performed by: INTERNAL MEDICINE

## 2023-03-22 PROCEDURE — 84100 ASSAY OF PHOSPHORUS: CPT | Performed by: INTERNAL MEDICINE

## 2023-03-22 PROCEDURE — 94660 CPAP INITIATION&MGMT: CPT

## 2023-03-22 PROCEDURE — 83735 ASSAY OF MAGNESIUM: CPT | Performed by: INTERNAL MEDICINE

## 2023-03-22 PROCEDURE — 25010000002 ONDANSETRON PER 1 MG: Performed by: NURSE PRACTITIONER

## 2023-03-22 PROCEDURE — 83036 HEMOGLOBIN GLYCOSYLATED A1C: CPT | Performed by: INTERNAL MEDICINE

## 2023-03-22 PROCEDURE — 85025 COMPLETE CBC W/AUTO DIFF WBC: CPT | Performed by: INTERNAL MEDICINE

## 2023-03-22 PROCEDURE — 82962 GLUCOSE BLOOD TEST: CPT

## 2023-03-22 RX ORDER — HYDRALAZINE HYDROCHLORIDE 20 MG/ML
10 INJECTION INTRAMUSCULAR; INTRAVENOUS EVERY 6 HOURS PRN
Status: DISCONTINUED | OUTPATIENT
Start: 2023-03-22 | End: 2023-03-25 | Stop reason: HOSPADM

## 2023-03-22 RX ORDER — DEXTROSE MONOHYDRATE 25 G/50ML
10-50 INJECTION, SOLUTION INTRAVENOUS
Status: DISCONTINUED | OUTPATIENT
Start: 2023-03-22 | End: 2023-03-25 | Stop reason: HOSPADM

## 2023-03-22 RX ORDER — OLANZAPINE 10 MG/2ML
1 INJECTION, POWDER, LYOPHILIZED, FOR SOLUTION INTRAMUSCULAR
Status: DISCONTINUED | OUTPATIENT
Start: 2023-03-22 | End: 2023-03-25 | Stop reason: HOSPADM

## 2023-03-22 RX ORDER — OLANZAPINE 10 MG/2ML
1 INJECTION, POWDER, LYOPHILIZED, FOR SOLUTION INTRAMUSCULAR
Status: DISCONTINUED | OUTPATIENT
Start: 2023-03-22 | End: 2023-03-22

## 2023-03-22 RX ORDER — NICOTINE POLACRILEX 4 MG
15 LOZENGE BUCCAL
Status: DISCONTINUED | OUTPATIENT
Start: 2023-03-22 | End: 2023-03-22 | Stop reason: SDUPTHER

## 2023-03-22 RX ORDER — INSULIN LISPRO 100 [IU]/ML
1-200 INJECTION, SOLUTION INTRAVENOUS; SUBCUTANEOUS AS NEEDED
Status: DISCONTINUED | OUTPATIENT
Start: 2023-03-22 | End: 2023-03-22

## 2023-03-22 RX ORDER — DEXTROSE MONOHYDRATE 25 G/50ML
25 INJECTION, SOLUTION INTRAVENOUS
Status: DISCONTINUED | OUTPATIENT
Start: 2023-03-22 | End: 2023-03-22 | Stop reason: SDUPTHER

## 2023-03-22 RX ORDER — NICOTINE POLACRILEX 4 MG
15 LOZENGE BUCCAL
Status: DISCONTINUED | OUTPATIENT
Start: 2023-03-22 | End: 2023-03-22

## 2023-03-22 RX ORDER — INSULIN LISPRO 100 [IU]/ML
1-200 INJECTION, SOLUTION INTRAVENOUS; SUBCUTANEOUS
Status: DISCONTINUED | OUTPATIENT
Start: 2023-03-22 | End: 2023-03-22

## 2023-03-22 RX ORDER — NICOTINE POLACRILEX 4 MG
15 LOZENGE BUCCAL
Status: DISCONTINUED | OUTPATIENT
Start: 2023-03-22 | End: 2023-03-25 | Stop reason: HOSPADM

## 2023-03-22 RX ORDER — INSULIN LISPRO 100 [IU]/ML
2-7 INJECTION, SOLUTION INTRAVENOUS; SUBCUTANEOUS
Status: DISCONTINUED | OUTPATIENT
Start: 2023-03-22 | End: 2023-03-22

## 2023-03-22 RX ORDER — DEXTROSE MONOHYDRATE 25 G/50ML
25 INJECTION, SOLUTION INTRAVENOUS
Status: DISCONTINUED | OUTPATIENT
Start: 2023-03-22 | End: 2023-03-25 | Stop reason: HOSPADM

## 2023-03-22 RX ORDER — DEXTROSE MONOHYDRATE 25 G/50ML
10-50 INJECTION, SOLUTION INTRAVENOUS
Status: DISCONTINUED | OUTPATIENT
Start: 2023-03-22 | End: 2023-03-22

## 2023-03-22 RX ORDER — OLANZAPINE 10 MG/2ML
1 INJECTION, POWDER, LYOPHILIZED, FOR SOLUTION INTRAMUSCULAR
Status: DISCONTINUED | OUTPATIENT
Start: 2023-03-22 | End: 2023-03-22 | Stop reason: SDUPTHER

## 2023-03-22 RX ADMIN — HEPARIN SODIUM 5000 UNITS: 5000 INJECTION INTRAVENOUS; SUBCUTANEOUS at 13:13

## 2023-03-22 RX ADMIN — DEXTROSE MONOHYDRATE 25 G: 25 INJECTION, SOLUTION INTRAVENOUS at 04:42

## 2023-03-22 RX ADMIN — LATANOPROST 1 DROP: 50 SOLUTION/ DROPS OPHTHALMIC at 22:34

## 2023-03-22 RX ADMIN — DEXTROSE MONOHYDRATE 25 G: 25 INJECTION, SOLUTION INTRAVENOUS at 16:51

## 2023-03-22 RX ADMIN — Medication 10 ML: at 09:12

## 2023-03-22 RX ADMIN — CETIRIZINE HYDROCHLORIDE 10 MG: 10 TABLET, FILM COATED ORAL at 09:10

## 2023-03-22 RX ADMIN — METOCLOPRAMIDE 10 MG: 10 TABLET ORAL at 09:10

## 2023-03-22 RX ADMIN — DEXTROSE MONOHYDRATE 25 G: 25 INJECTION, SOLUTION INTRAVENOUS at 23:44

## 2023-03-22 RX ADMIN — HEPARIN SODIUM 5000 UNITS: 5000 INJECTION INTRAVENOUS; SUBCUTANEOUS at 22:34

## 2023-03-22 RX ADMIN — HEPARIN SODIUM 5000 UNITS: 5000 INJECTION INTRAVENOUS; SUBCUTANEOUS at 05:31

## 2023-03-22 RX ADMIN — CALCITRIOL CAPSULES 0.25 MCG 0.5 MCG: 0.25 CAPSULE ORAL at 09:10

## 2023-03-22 RX ADMIN — Medication 10 ML: at 22:38

## 2023-03-22 RX ADMIN — ONDANSETRON 4 MG: 2 INJECTION INTRAMUSCULAR; INTRAVENOUS at 20:05

## 2023-03-22 RX ADMIN — DEXTROSE MONOHYDRATE 25 G: 25 INJECTION, SOLUTION INTRAVENOUS at 03:47

## 2023-03-22 NOTE — CASE MANAGEMENT/SOCIAL WORK
Continued Stay Note  ZAYNAB Dunham     Patient Name: Gerry Torres  MRN: [id number]  Today's Date: 3/22/2023    Admit Date: 3/20/2023    Plan: D/C plan: Home with spouse. New OP HD set up: [facility]. To start Friday 3/24. Watch for oxygen needs.   Discharge Plan     Row Name 03/22/23 1428       Plan    Plan D/C plan: Home with spouse. New OP HD set up: [facility]. To start Friday 3/24. Watch for oxygen needs.    Patient/Family in Agreement with Plan yes    Plan Comments New HD arranged to start on 3/24, ok per Dr. Alaniz. Chair time [facility] 0705 @ [facility]. Barrier to d/c: hypoglycemic overnight (glucose=12). Possible d/c tomorow. Will need 6 min walk if requiring oxygen.                   Phone communication or documentation only - no physical contact with patient or family.      Jack Keller RN

## 2023-03-22 NOTE — NURSING NOTE
Nurse called into pt room by tech. Tech states pt is acting different. Upon assessment pt noted unresponsive with eyes open. /60. This nurse, , and tech at bedside. Blood Glucose 12. D50W IV Push given per protocol. Fast Team called. Pt more alert. Pt states did my blood sugar drop. Upon recheck Blood Glucose 119. Fast Team cancelled. Pt AOx2-3 still appears drowsy. Dr Leach message through secure chat per request. Awaiting return Call

## 2023-03-22 NOTE — PLAN OF CARE
Rapid response called as patient unresponsive, glucose was 12, given D50 and glucose improved.    #Hypoglycemia    - Glucose 12    - patient is a type 1 diabetic recently started on HD    - cancel home glucose regimen    - glucommander protocol started    - hypoglycemia protocol    - glucose and responsiveness improved after D50    - continue to monitor glucose    - endocrinology consulted to adjust home insulin    Electronically signed by Meme Leach DO, 03/22/23, 4:21 AM EDT.

## 2023-03-22 NOTE — PROGRESS NOTES
: the pt. Feeling ok , denies com[plains , chart reviewed    Vital Signs  Vitals:    03/22/23 1116   BP: 159/63   Pulse: 76   Resp: 16   Temp: 97.6 °F (36.4 °C)   SpO2: 96%     I/O this shift:  In: 240 [P.O.:240]  Out: -   I/O last 3 completed shifts:  In: 890 [P.O.:840; IV Piggyback:50]  Out: 2200 [Urine:200; Other:2000]      Physical Exam:    General: in nad  HEENT:  Normocephalic, Atraumatic, EOMI, PERRLA, NO icterus,  Neck:  Supple, No JVD, No Carotid artery bruit, No lymphadenopathy  Chest: Clear to auscultation bilaterally,  Cardiovascular: Regular rate and rhythm. Positive S1 & S2, No rub, murmur or gallop.  Abdominal: Soft, nontender, no palpable organomegaly, no abdominal bruit, positive bowel sounds  Musculoskeletal: No joint tenderness or swelling, good range of motion, Adequate muscle strength on both sides, no cyanosis, clubbing positive edema on lower extremities.  Neuro: Alert oriented x3, CN1 - 12 intact, No focal sensory or motor deficit.      Review of Systems:   CNS: Denied headaches, blurred vision, tingling or numbness in any part of body. No weakness or any impaired speech.  CVS: No chest pain or shortness of breath, No orthopnea or PND or exertional dyspnea,  Pulmonary: Denies shortness of breath, coughs, hematemesis, night sweats or weight loss.  GI: Denies diarrhea, nausea, vomiting. Denies weight loss, hematemesis, melena.  : Denies dysuria, frequency or hesitancy of urination  Vascular: Denies claudication, resting pain, tingling numbness or weakness in any part of the body.  Musculoskeletal: Denies Joint tenderness or pain, denies stiffness in joints, denies fever or weight loss, or skin rashes.    Current Labs  [unfilled]  Lab Results (last 24 hours)     Procedure Component Value Units Date/Time    Hemoglobin A1c [[id number]]  (Abnormal) Collected: 03/22/23 0909    Specimen: Blood Updated: 03/22/23 1115     Hemoglobin A1C 6.40 %     POC Glucose Once [[id number]]  (Abnormal)  Collected: 03/22/23 1044    Specimen: Blood Updated: 03/22/23 1045     Glucose 122 mg/dL      Comment: Serial Number: [id number]:  [id number]       CBC & Differential [[id number]]  (Abnormal) Collected: 03/22/23 0909    Specimen: Blood Updated: 03/22/23 1007    Narrative:      The following orders were created for panel order CBC & Differential.  Procedure                               Abnormality         Status                     ---------                               -----------         ------                     CBC Auto Differential[[id number]]        Abnormal            Final result                 Please view results for these tests on the individual orders.    CBC Auto Differential [[id number]]  (Abnormal) Collected: 03/22/23 0909    Specimen: Blood Updated: 03/22/23 1007     WBC 9.80 10*3/mm3      RBC 2.73 10*6/mm3      Hemoglobin 8.0 g/dL      Hematocrit 25.4 %      MCV 93.3 fL      MCH 29.5 pg      MCHC 31.6 g/dL      RDW 14.5 %      RDW-SD 49.9 fl      MPV 11.2 fL      Platelets 126 10*3/mm3      Neutrophil % 71.9 %      Lymphocyte % 11.0 %      Monocyte % 15.2 %      Eosinophil % 1.6 %      Basophil % 0.3 %      Neutrophils, Absolute 7.00 10*3/mm3      Lymphocytes, Absolute 1.10 10*3/mm3      Monocytes, Absolute 1.50 10*3/mm3      Eosinophils, Absolute 0.20 10*3/mm3      Basophils, Absolute 0.00 10*3/mm3      nRBC 0.3 /100 WBC     Hemoglobin A1c [[id number]] Collected: 03/21/23 0504    Specimen: Blood from Arm, Right Updated: 03/22/23 0901    POC Glucose Once [[id number]]  (Normal) Collected: 03/22/23 0709    Specimen: Blood Updated: 03/22/23 0711     Glucose 94 mg/dL      Comment: Serial Number: [id number]:  [id number]       POC Glucose Once [[id number]]  (Normal) Collected: 03/22/23 0548    Specimen: Blood Updated: 03/22/23 0549     Glucose 88 mg/dL      Comment: Serial Number: [id number]:  [id number]       Phosphorus [[id number]]  (Abnormal) Collected: 03/22/23 0430    Specimen: Blood  Updated: 03/22/23 0509     Phosphorus 6.6 mg/dL     Comprehensive Metabolic Panel [[id number]]  (Abnormal) Collected: 03/22/23 0430    Specimen: Blood Updated: 03/22/23 0509     Glucose 68 mg/dL      BUN 63 mg/dL      Creatinine 5.01 mg/dL      Sodium 135 mmol/L      Potassium 4.6 mmol/L      Comment: Slight hemolysis detected by analyzer. Results may be affected.        Chloride 99 mmol/L      CO2 21.0 mmol/L      Calcium 7.8 mg/dL      Total Protein 6.3 g/dL      Albumin 3.8 g/dL      ALT (SGPT) 12 U/L      AST (SGOT) 21 U/L      Comment: Slight hemolysis detected by analyzer. Results may be affected.        Alkaline Phosphatase 100 U/L      Total Bilirubin 0.3 mg/dL      Globulin 2.5 gm/dL      A/G Ratio 1.5 g/dL      BUN/Creatinine Ratio 12.6     Anion Gap 15.0 mmol/L      eGFR 11.6 mL/min/1.73      Comment: <15 Indicative of kidney failure       Narrative:      GFR Normal >60  Chronic Kidney Disease <60  Kidney Failure <15    The GFR formula is only valid for adults with stable renal function between ages 18 and 70.    Magnesium [[id number]]  (Normal) Collected: 03/22/23 0430    Specimen: Blood Updated: 03/22/23 0504     Magnesium 1.6 mg/dL     POC Glucose Once [[id number]]  (Abnormal) Collected: 03/22/23 0457    Specimen: Blood Updated: 03/22/23 0459     Glucose 129 mg/dL      Comment: Serial Number: [id number]:  [id number]       POC Glucose Once [[id number]]  (Abnormal) Collected: 03/22/23 0436    Specimen: Blood Updated: 03/22/23 0438     Glucose 52 mg/dL      Comment: Serial Number: [id number]:  [id number]       POC Glucose Once [[id number]]  (Abnormal) Collected: 03/22/23 0351    Specimen: Blood Updated: 03/22/23 0353     Glucose 119 mg/dL      Comment: Serial Number: [id number]:  [id number]       POC Glucose Once [[id number]]  (Abnormal) Collected: 03/22/23 0345    Specimen: Blood Updated: 03/22/23 0348     Glucose 12 mg/dL      Comment: Serial Number: [id number]:  [id number]       POC  Glucose Once [[id number]]  (Abnormal) Collected: 03/21/23 1653    Specimen: Blood Updated: 03/21/23 1654     Glucose 141 mg/dL      Comment: Serial Number: [id number]:  [id number]       High Sensitivity Troponin T 2Hr [[id number]]  (Abnormal) Collected: 03/21/23 1202    Specimen: Blood Updated: 03/21/23 1318     HS Troponin T 96 ng/L      Troponin T Delta 11 ng/L     Narrative:      High Sensitive Troponin T Reference Range:  <10.0 ng/L- Negative Female for AMI  <15.0 ng/L- Negative Male for AMI  >=10 - Abnormal Female indicating possible myocardial injury.  >=15 - Abnormal Male indicating possible myocardial injury.   Clinicians would have to utilize clinical acumen, EKG, Troponin, and serial changes to determine if it is an Acute Myocardial Infarction or myocardial injury due to an underlying chronic condition.         Urine Culture - Urine, Urine, Clean Catch [[id number]]  (Normal) Collected: 03/20/23 1233    Specimen: Urine, Clean Catch Updated: 03/21/23 1306     Urine Culture No growth        Current Radiology  Imaging Results (Last 24 Hours)     ** No results found for the last 24 hours. **        Current Meds    Current Facility-Administered Medications:   •  acetaminophen (TYLENOL) tablet 650 mg, 650 mg, Oral, Q4H PRN **OR** acetaminophen (TYLENOL) 160 MG/5ML solution 650 mg, 650 mg, Oral, Q4H PRN **OR** acetaminophen (TYLENOL) suppository 650 mg, 650 mg, Rectal, Q4H PRN, Keiry Riddle APRN  •  albumin human 25 % IV SOLN 12.5 g, 12.5 g, Intravenous, Q1H PRN, Garrett King MD  •  calcitriol (ROCALTROL) capsule 0.5 mcg, 0.5 mcg, Oral, Once per day on Mon Wed Fri, Keiry Riddle APRN, 0.5 mcg at 03/22/23 0910  •  cetirizine (zyrTEC) tablet 10 mg, 10 mg, Oral, Daily, Keiry Riddle APRN, 10 mg at 03/22/23 0910  •  dextrose (D50W) (25 g/50 mL) IV injection 10-50 mL, 10-50 mL, Intravenous, Q15 Min PRN, Meme Leach,   •  dextrose (D50W) (25 g/50 mL) IV injection 10-50 mL, 10-50 mL, Intravenous,  Q15 Min PRN, Meme Leach, DO  •  dextrose (D50W) (25 g/50 mL) IV injection 25 g, 25 g, Intravenous, Q15 Min PRN, Keiry Riddle APRN, 25 g at 03/22/23 0442  •  dextrose (GLUTOSE) oral gel 15 g, 15 g, Oral, Q15 Min PRN, Meme Leach, DO  •  epoetin ajnes-epbx (RETACRIT) injection 10,000 Units, 10,000 Units, Subcutaneous, Once per day on Mon Wed Fri, Garrett King MD  •  glucagon (human recombinant) (GLUCAGEN DIAGNOSTIC) 1 mg in sterile water (preservative free) 1 mL injection, 1 mg, Intramuscular, Q15 Min PRN, Meme Leach DO  •  heparin (porcine) 5000 UNIT/ML injection 5,000 Units, 5,000 Units, Subcutaneous, Q8H, Keiry Riddle APRN, 5,000 Units at 03/22/23 0531  •  heparin (porcine) injection 2,000 Units, 2,000 Units, Intracatheter, PRN, Garrett King MD  •  heparin (porcine) injection 5,000 Units, 5,000 Units, Intracatheter, PRN, Garrett King MD, 4,500 Units at 03/21/23 1413  •  hydrALAZINE (APRESOLINE) injection 10 mg, 10 mg, Intravenous, Q6H PRN, Gabriel Casanova MD  •  HYDROcodone-acetaminophen (NORCO) 5-325 MG per tablet 1 tablet, 1 tablet, Oral, Q4H PRN, Keiry Riddle APRN, 1 tablet at 03/20/23 2251  •  insulin glargine (LANTUS, SEMGLEE) injection 1-200 Units, 1-200 Units, Subcutaneous, Nightly - Haylee Weston Waitman, DO  •  insulin glargine (LANTUS, SEMGLEE) injection 1-200 Units, 1-200 Units, Subcutaneous, Nightly - GlucomHaylee carr Waitman, DO  •  insulin lispro (ADMELOG) injection 1-200 Units, 1-200 Units, Subcutaneous, 4x Daily With Meals & Nightly, Meme Leach, DO  •  insulin lispro (ADMELOG) injection 1-200 Units, 1-200 Units, Subcutaneous, PRN, Meme Leach, DO  •  latanoprost (XALATAN) 0.005 % ophthalmic solution 1 drop, 1 drop, Both Eyes, Nightly, Keiry Riddle APRN, 1 drop at 03/21/23 2127  •  metoclopramide (REGLAN) tablet 10 mg, 10 mg, Oral, BID, Keiry Riddle APRN, 10 mg at 03/22/23 0910  •  midodrine (PROAMATINE) tablet 5 mg, 5 mg,  Oral, TID PRN, Gabriel Casanova MD  •  morphine injection 2 mg, 2 mg, Intravenous, Q4H PRN, Gabriel Casanova MD, 2 mg at 03/21/23 2113  •  naloxone (NARCAN) injection 0.4 mg, 0.4 mg, Intravenous, Q5 Min PRN, Gabriel Casanova MD  •  nitroglycerin (NITROSTAT) SL tablet 0.4 mg, 0.4 mg, Sublingual, Q5 Min PRN, Keiry Riddle APRN  •  ondansetron (ZOFRAN) tablet 4 mg, 4 mg, Oral, Q6H PRN **OR** ondansetron (ZOFRAN) injection 4 mg, 4 mg, Intravenous, Q6H PRN, Keiry Riddle APRN  •  oxyCODONE (ROXICODONE) immediate release tablet 10 mg, 10 mg, Oral, Q4H PRN, Gabriel Casanova MD, 10 mg at 03/21/23 1544  •  [COMPLETED] Insert Peripheral IV, , , Once **AND** sodium chloride 0.9 % flush 10 mL, 10 mL, Intravenous, PRN, Keiry Riddle APRRIDDHI  •  sodium chloride 0.9 % flush 10 mL, 10 mL, Intravenous, Q12H, Keiry Riddle APRN, 10 mL at 03/22/23 0912  •  sodium chloride 0.9 % flush 10 mL, 10 mL, Intravenous, PRN, Keiry Riddle APRRIDDHI  •  sodium chloride 0.9 % infusion 40 mL, 40 mL, Intravenous, PRN, Keiry Riddle APRN              Patient Active Problem List   Diagnosis   • Family history of malignant neoplasm of trachea, bronchus and lung   • Hyperlipidemia   • Hypertension   • Onychomycosis of toenail   • 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  03/22/23  11:17 EDT

## 2023-03-22 NOTE — PROGRESS NOTES
"    [facility]   Daily Progress Note    Patient Name: Gerry Torres  : 1950  MRN: [id number]  Primary Care Physician:  Mary Le MD  Date of admission: 3/20/2023  Date and Time of Service: 3/22/2023 at 0 800      Subjective      Chief Complaint: Shortness of breath, abnormal lab studies    Patient Reports he feels good this morning, no issues with hemodialysis    Subjective:  Symptoms:  No shortness of breath or chest pain.    Diet:  No nausea.    Activity level: Normal.    Pain:  He reports no pain.          Objective      Vitals:   Temp:  [97.4 °F (36.3 °C)-98.3 °F (36.8 °C)] 98.3 °F (36.8 °C)  Heart Rate:  [] 75  Resp:  [13-20] 16  BP: (142-185)/(59-80) 155/59  Flow (L/min):  [2] 2  Objective:  General Appearance:  Comfortable.    Vital signs: (most recent): Blood pressure 159/63, pulse 76, temperature 97.6 °F (36.4 °C), temperature source Oral, resp. rate 16, height 182.9 cm (72\"), weight 134 kg (295 lb 6.7 oz), SpO2 96 %.    Lungs:  Normal effort and normal respiratory rate.  Breath sounds clear to auscultation.    Heart: Normal rate.  Regular rhythm.  S1 normal and S2 normal.    Abdomen: Abdomen is soft and non-distended.    Extremities: There is dependent edema (1+pretibial).    Neurological: Patient is alert and oriented to person, place and time.    Skin:  Warm and dry.                    Result Review    Result Review:  I have personally reviewed the results from the time of this admission to 3/22/2023 07:59 EDT and agree with these findings:  [x]  Laboratory  [x]  Microbiology  []  Radiology  []  EKG/Telemetry   [x]  Cardiology/Vascular   []  Pathology  []  Old records  []  Other:  Most notable findings include: Urinalysis cultures negative, BUN 63, creatinine 5.0, serum HCO3 21          Assessment & Plan      Brief Patient Summary:  Gerry Torres is a 72 y.o. male who was admitted with abnormal lab studies and shortness of breath, new ESRD and " initiation of hemodialysis after tunnel cath placement.  Has been tolerating dialysis without issue, unfortunately patient had acute hypoglycemic episode overnight requiring rapid response and multiple D50, Dr. Palmer has been consulted for opinion regarding medication changes for his DM type I.  Plan for hemodialysis as an outpatient on Friday, anticipate discharge tomorrow if no issues and all consultants agree.      calcitriol, 0.5 mcg, Oral, Once per day on Mon Wed Fri  cetirizine, 10 mg, Oral, Daily  epoetin janes/janes-epbx, 10,000 Units, Subcutaneous, Once per day on Mon Wed Fri  heparin (porcine), 5,000 Units, Subcutaneous, Q8H  insulin glargine, 1-200 Units, Subcutaneous, Nightly - Glucommander  insulin glargine, 1-200 Units, Subcutaneous, Nightly - Glucommander  insulin lispro, 1-200 Units, Subcutaneous, 4x Daily With Meals & Nightly  latanoprost, 1 drop, Both Eyes, Nightly  metoclopramide, 10 mg, Oral, BID  sodium chloride, 10 mL, Intravenous, Q12H             Active Hospital Problems:  Active Hospital Problems    Diagnosis    • **Acute kidney injury superimposed on CKD (HCC)      Plan:     Progression to ESRD with azotemia, hyperkalemia  -Renal following  -Tunnel cath placement 3/20/2023 for HD initiation, plan for fistula later this month  -follow K+, improved s/p HD  -Initiate PT/OT for discharge assessment     Shortness of breath, elevated BNP/elevated troponin, lower extremity edema  -Elevated troponin likely due to new onset ESRD  -Normal LV function and RV function on echocardiogram  -EKG without acute ST changes  -Stress test from [facility] 1/2023 negative for ischemia     Hypotension, history HTN  -Continue to hold hypertensive medications for now  -stable off of antihypertensives     Back pain, CVA tenderness  -UA culture negative  -Back pain resolving     Normocytic anemia of CKD  -epogen per renal     Thrombocytopenia  -Probably reactive, continue to trend     DM 1, hypoglycemic  overnight   -d/c home insulin therapy  -on Glucommander protocol for insulin coverage  -consulted Dr. Palmer     Pancreatic cancer, renal mass s/p whipple procedure/splenectomy partial nephrectomy 2022     GREGG  -Home CPAP       DVT prophylaxis:  Medical and mechanical DVT prophylaxis orders are present.    CODE STATUS:    Code Status (Patient has no pulse and is not breathing): CPR (Attempt to Resuscitate)  Medical Interventions (Patient has pulse or is breathing): Full Support      Disposition:  I expect patient to be discharged in 1 to 2 days.    This patient has been examined wearing appropriate Personal Protective Equipment . 03/22/23     Discussed plan of care with bedside nurse, patient, attending physician      Electronically signed by CURT Garibay, 03/22/23, 07:59 EDT.  [facility]ist Team

## 2023-03-22 NOTE — CONSULTS
Inpatient Endocrine Consult  Consultation requested by hospitalist team for uncontrolled type 2 diabetes/hypoglycemia  Patient Care Team:  Mary Le MD as PCP - General (Family Medicine)  Fernando Burch MD as Consulting Physician (Hematology and Oncology)    Chief Complaint: Uncontrolled type 2 diabetes/hypoglycemia    HPI: This is a 72-year-old male with complicated medical history including type 2 diabetes, CKD stage IV disease, history of pancreatic cancer status post Whipple procedure last year, renal mass with partial nephrectomy, hypertension, neuroendocrine tumor, presented with increased shortness of breath and weight gain.  He is admitted with acute kidney injury superimposed on CKD and developed severe hypoglycemia today and an endocrine consultation was requested for further evaluation management.  Patient tells me that he takes 2 different insulins at home including Toujeo 47 units at bedtime and Humalog 9 units before breakfast and lunch and 13 before supper.  He was on Lantus 40 units nightly last dose was given last night.  He developed low blood sugar today.  He is eating well at this time.    Past Medical History:   Diagnosis Date   • Diabetes mellitus (HCC)    • Hyperlipidemia    • Hypertension    • Sleep apnea        Social History     Socioeconomic History   • Marital status:    Tobacco Use   • Smoking status: Former     Types: Cigarettes     Quit date: 2018     Years since quittin.3   Vaping Use   • Vaping Use: Never used   Substance and Sexual Activity   • Alcohol use: Not Currently   • Drug use: Yes     Frequency: 5.0 times per week     Types: Marijuana     Comment: occasional gummy   • Sexual activity: Defer       History reviewed. No pertinent family history.    No Known Allergies    ROS:   Constitutional:  Denies fatigue, tiredness.    Eyes:  Denies change in visual acuity   HENT:  Denies nasal congestion or sore throat   Respiratory: Denies cough, shortness  of breath.   Cardiovascular:  Denies chest pain, edema   GI:  Denies abdominal pain, nausea, vomiting.   :  Denies polyuria and polydipsia  Musculoskeletal:  Denies back pain or joint pain   Integument:  Denies dry skin, rash   Neurologic:  Denies headache, focal weakness or sensory changes   Endocrine:  Denies polyuria or polydipsia   Psychiatric:  Denies depression or anxiety      Vitals:    03/22/23 1543   BP: 146/70   Pulse: 81   Resp: 17   Temp: 97.6 °F (36.4 °C)   SpO2: 92%      Body mass index is 40.07 kg/m².     Physical Exam:  GEN: NAD, conversant  EYES: EOMI, PERRL, no conjunctival erythema  NECK: no thyromegaly, full ROM   CV: RRR, no murmurs/rubs/gallops, no peripheral edema  LUNG: CTAB, no wheezes/rales/rhonchi  SKIN: no rashes, no acanthosis  MSK: no deformities, full ROM of all extremities  NEURO: no tremors, DTR normal  PSYCH: AOX3, appropriate mood, affect normal      Results Review:     I reviewed the patient's new clinical results.    Lab Results   Component Value Date    GLUCOSE 68 03/22/2023    BUN 63 (H) 03/22/2023    CREATININE 5.01 (H) 03/22/2023    BCR 12.6 03/22/2023    K 4.6 03/22/2023    CO2 21.0 (L) 03/22/2023    CALCIUM 7.8 (L) 03/22/2023    ALBUMIN 3.8 03/22/2023    LABIL2 1.7 12/16/2020    AST 21 03/22/2023    ALT 12 03/22/2023       Lab Results   Component Value Date    HGBA1C 6.40 (H) 03/22/2023    HGBA1C 6.90 (H) 03/21/2023    HGBA1C 6.8 (H) 09/14/2020     Lab Results   Component Value Date    CREATININE 5.01 (H) 03/22/2023     Results from last 7 days   Lab Units 03/22/23  1710 03/22/23  1647 03/22/23  1629 03/22/23  1159 03/22/23  1044 03/22/23  0709   GLUCOSE mg/dL 87 37* 32* 88 122* 94       Medication Review: Reviewed.       Current Facility-Administered Medications:   •  acetaminophen (TYLENOL) tablet 650 mg, 650 mg, Oral, Q4H PRN **OR** acetaminophen (TYLENOL) 160 MG/5ML solution 650 mg, 650 mg, Oral, Q4H PRN **OR** acetaminophen (TYLENOL) suppository 650 mg, 650 mg,  Rectal, Q4H PRN, Keiry Riddle APRN  •  albumin human 25 % IV SOLN 12.5 g, 12.5 g, Intravenous, Q1H PRN, Garrett King MD  •  calcitriol (ROCALTROL) capsule 0.5 mcg, 0.5 mcg, Oral, Once per day on Mon Wed Fri, Kiery Riddle APRN, 0.5 mcg at 03/22/23 0910  •  cetirizine (zyrTEC) tablet 10 mg, 10 mg, Oral, Daily, Keiry Riddle APRN, 10 mg at 03/22/23 0910  •  dextrose (D50W) (25 g/50 mL) IV injection 10-50 mL, 10-50 mL, Intravenous, Q15 Min PRN, Meme Leach DO  •  dextrose (D50W) (25 g/50 mL) IV injection 25 g, 25 g, Intravenous, Q15 Min PRN, Gabriel Casanova MD, 25 g at 03/22/23 1651  •  dextrose (GLUTOSE) oral gel 15 g, 15 g, Oral, Q15 Min PRN, Gabriel Casanova MD  •  [START ON 3/24/2023] epoetin janes-epbx (RETACRIT) injection 15,000 Units, 15,000 Units, Subcutaneous, Once per day on Mon Wed Fri, Altagracia Peoples MD  •  glucagon (human recombinant) (GLUCAGEN DIAGNOSTIC) 1 mg in sterile water (preservative free) 1 mL injection, 1 mg, Intramuscular, Q15 Min PRN, Gabriel Casanova MD  •  heparin (porcine) 5000 UNIT/ML injection 5,000 Units, 5,000 Units, Subcutaneous, Q8H, Keiry Riddle APRN, 5,000 Units at 03/22/23 1313  •  heparin (porcine) injection 2,000 Units, 2,000 Units, Intracatheter, PRN, Garrett King MD  •  heparin (porcine) injection 5,000 Units, 5,000 Units, Intracatheter, PRN, Garrett King MD, 4,500 Units at 03/21/23 1413  •  hydrALAZINE (APRESOLINE) injection 10 mg, 10 mg, Intravenous, Q6H PRN, Gabriel Casanova MD  •  HYDROcodone-acetaminophen (NORCO) 5-325 MG per tablet 1 tablet, 1 tablet, Oral, Q4H PRN, Keiry Riddle APRN, 1 tablet at 03/20/23 2251  •  insulin lispro (ADMELOG) injection 2-7 Units, 2-7 Units, Subcutaneous, TID With Meals, Gabriel Casanova MD  •  latanoprost (XALATAN) 0.005 % ophthalmic solution 1 drop, 1 drop, Both Eyes, Nightly, Keiry Riddle APRN, 1 drop at 03/21/23 2127  •  metoclopramide (REGLAN) tablet 10 mg, 10 mg, Oral, BID, Keiry Riddle APRN, 10 mg at  03/22/23 0910  •  midodrine (PROAMATINE) tablet 5 mg, 5 mg, Oral, TID PRN, Gabriel Casanova MD  •  morphine injection 2 mg, 2 mg, Intravenous, Q4H PRN, Gabriel Casanova MD, 2 mg at 03/21/23 2113  •  naloxone (NARCAN) injection 0.4 mg, 0.4 mg, Intravenous, Q5 Min PRN, Gabriel Casanova MD  •  nitroglycerin (NITROSTAT) SL tablet 0.4 mg, 0.4 mg, Sublingual, Q5 Min PRN, Keiry Riddle APRN  •  ondansetron (ZOFRAN) tablet 4 mg, 4 mg, Oral, Q6H PRN **OR** ondansetron (ZOFRAN) injection 4 mg, 4 mg, Intravenous, Q6H PRN, Keiry Riddle APRN  •  oxyCODONE (ROXICODONE) immediate release tablet 10 mg, 10 mg, Oral, Q4H PRN, Gabriel Casanova MD, 10 mg at 03/21/23 1544  •  [COMPLETED] Insert Peripheral IV, , , Once **AND** sodium chloride 0.9 % flush 10 mL, 10 mL, Intravenous, PRN, Keiry Riddle APRN  •  sodium chloride 0.9 % flush 10 mL, 10 mL, Intravenous, Q12H, Keiry Riddle APRN, 10 mL at 03/22/23 0912  •  sodium chloride 0.9 % flush 10 mL, 10 mL, Intravenous, PRN, Keiry Riddle APRN  •  sodium chloride 0.9 % infusion 40 mL, 40 mL, Intravenous, PRN, Keiry Riddle APRN          Assessment and plan:  Hypoglycemia: Severe, most likely due to insulin, at this time I will hold all the insulins and follow blood sugars.  Once stable we will resume the insulin if needed on lower dose.    Diabetes mellitus type 2 with hyperglycemia: Last A1c 6.4%.  We will follow blood sugars.  Stay off insulins until the hypoglycemia is resolved and if he develops hypoglycemia then we will consider resuming insulin at a lower dose.    Acute kidney injury superimposed on CKD: Followed by nephrology    Hypertension: On amlodipine    Pancreatic cancer/neuroendocrine tumor: Status post Whipple procedure.    Thank you very much for the consultation.      Augusta Palmer MD FACE.

## 2023-03-22 NOTE — NURSING NOTE
"Patient turned his call light on and nurse went in. patient states \"I think we should check my sugar i'm feeling weird.\" Checked glucose and it was 32. Gave an apple juice and peanut butter crackers, patient finished these. Will recheck at 1645. Helped patient order his dinner.   "

## 2023-03-22 NOTE — PLAN OF CARE
Goal Outcome Evaluation:                 Patient's blood sugars remained above 88 this shift. No insulin given this shift. Waiting on endocrinology to see patient. Glucommander discontinued and sliding scale reordered. Dialysis scheduled for tomorrow.    No

## 2023-03-23 ENCOUNTER — TRANSCRIBE ORDERS (OUTPATIENT)
Dept: ADMINISTRATIVE | Facility: HOSPITAL | Age: 73
End: 2023-03-23
Payer: MEDICARE

## 2023-03-23 DIAGNOSIS — N18.6 END STAGE RENAL DISEASE: Primary | ICD-10-CM

## 2023-03-23 LAB
ALBUMIN SERPL-MCNC: 3.6 G/DL (ref 3.5–5.2)
ALBUMIN/GLOB SERPL: 1.6 G/DL
ALP SERPL-CCNC: 97 U/L (ref 39–117)
ALT SERPL W P-5'-P-CCNC: 8 U/L (ref 1–41)
ANION GAP SERPL CALCULATED.3IONS-SCNC: 13 MMOL/L (ref 5–15)
AST SERPL-CCNC: 20 U/L (ref 1–40)
BASOPHILS # BLD AUTO: 0 10*3/MM3 (ref 0–0.2)
BASOPHILS NFR BLD AUTO: 0.4 % (ref 0–1.5)
BILIRUB SERPL-MCNC: 0.2 MG/DL (ref 0–1.2)
BUN SERPL-MCNC: 67 MG/DL (ref 8–23)
BUN/CREAT SERPL: 12.7 (ref 7–25)
CALCIUM SPEC-SCNC: 7.8 MG/DL (ref 8.6–10.5)
CHLORIDE SERPL-SCNC: 101 MMOL/L (ref 98–107)
CO2 SERPL-SCNC: 24 MMOL/L (ref 22–29)
CREAT SERPL-MCNC: 5.27 MG/DL (ref 0.76–1.27)
DEPRECATED RDW RBC AUTO: 49.9 FL (ref 37–54)
EGFRCR SERPLBLD CKD-EPI 2021: 11 ML/MIN/1.73
EOSINOPHIL # BLD AUTO: 0.3 10*3/MM3 (ref 0–0.4)
EOSINOPHIL NFR BLD AUTO: 3.1 % (ref 0.3–6.2)
ERYTHROCYTE [DISTWIDTH] IN BLOOD BY AUTOMATED COUNT: 14.6 % (ref 12.3–15.4)
GLOBULIN UR ELPH-MCNC: 2.2 GM/DL
GLUCOSE BLDC GLUCOMTR-MCNC: 102 MG/DL (ref 70–105)
GLUCOSE BLDC GLUCOMTR-MCNC: 103 MG/DL (ref 70–105)
GLUCOSE BLDC GLUCOMTR-MCNC: 103 MG/DL (ref 70–105)
GLUCOSE BLDC GLUCOMTR-MCNC: 107 MG/DL (ref 70–105)
GLUCOSE BLDC GLUCOMTR-MCNC: 126 MG/DL (ref 70–105)
GLUCOSE BLDC GLUCOMTR-MCNC: 157 MG/DL (ref 70–105)
GLUCOSE BLDC GLUCOMTR-MCNC: 170 MG/DL (ref 70–105)
GLUCOSE BLDC GLUCOMTR-MCNC: 172 MG/DL (ref 70–105)
GLUCOSE BLDC GLUCOMTR-MCNC: 235 MG/DL (ref 70–105)
GLUCOSE BLDC GLUCOMTR-MCNC: 249 MG/DL (ref 70–105)
GLUCOSE BLDC GLUCOMTR-MCNC: 61 MG/DL (ref 70–105)
GLUCOSE BLDC GLUCOMTR-MCNC: 68 MG/DL (ref 70–105)
GLUCOSE BLDC GLUCOMTR-MCNC: 74 MG/DL (ref 70–105)
GLUCOSE BLDC GLUCOMTR-MCNC: 76 MG/DL (ref 70–105)
GLUCOSE BLDC GLUCOMTR-MCNC: 77 MG/DL (ref 70–105)
GLUCOSE BLDC GLUCOMTR-MCNC: 82 MG/DL (ref 70–105)
GLUCOSE BLDC GLUCOMTR-MCNC: 82 MG/DL (ref 70–105)
GLUCOSE BLDC GLUCOMTR-MCNC: 87 MG/DL (ref 70–105)
GLUCOSE BLDC GLUCOMTR-MCNC: 92 MG/DL (ref 70–105)
GLUCOSE SERPL-MCNC: 113 MG/DL (ref 65–99)
HCT VFR BLD AUTO: 24.2 % (ref 37.5–51)
HGB BLD-MCNC: 7.9 G/DL (ref 13–17.7)
LYMPHOCYTES # BLD AUTO: 1.5 10*3/MM3 (ref 0.7–3.1)
LYMPHOCYTES NFR BLD AUTO: 14.4 % (ref 19.6–45.3)
MAGNESIUM SERPL-MCNC: 1.5 MG/DL (ref 1.6–2.4)
MAGNESIUM SERPL-MCNC: 1.7 MG/DL (ref 1.6–2.4)
MCH RBC QN AUTO: 30 PG (ref 26.6–33)
MCHC RBC AUTO-ENTMCNC: 32.5 G/DL (ref 31.5–35.7)
MCV RBC AUTO: 92.3 FL (ref 79–97)
MONOCYTES # BLD AUTO: 1.8 10*3/MM3 (ref 0.1–0.9)
MONOCYTES NFR BLD AUTO: 17.4 % (ref 5–12)
NEUTROPHILS NFR BLD AUTO: 6.8 10*3/MM3 (ref 1.7–7)
NEUTROPHILS NFR BLD AUTO: 64.7 % (ref 42.7–76)
NRBC BLD AUTO-RTO: 0.5 /100 WBC (ref 0–0.2)
PHOSPHATE SERPL-MCNC: 5.6 MG/DL (ref 2.5–4.5)
PLATELET # BLD AUTO: 127 10*3/MM3 (ref 140–450)
PMV BLD AUTO: 11.1 FL (ref 6–12)
POTASSIUM SERPL-SCNC: 4.8 MMOL/L (ref 3.5–5.2)
PROT SERPL-MCNC: 5.8 G/DL (ref 6–8.5)
QT INTERVAL: 465 MS
QT INTERVAL: 466 MS
RBC # BLD AUTO: 2.62 10*6/MM3 (ref 4.14–5.8)
SODIUM SERPL-SCNC: 138 MMOL/L (ref 136–145)
WBC NRBC COR # BLD: 10.6 10*3/MM3 (ref 3.4–10.8)

## 2023-03-23 PROCEDURE — 97165 OT EVAL LOW COMPLEX 30 MIN: CPT

## 2023-03-23 PROCEDURE — 83735 ASSAY OF MAGNESIUM: CPT | Performed by: NURSE PRACTITIONER

## 2023-03-23 PROCEDURE — 83735 ASSAY OF MAGNESIUM: CPT | Performed by: INTERNAL MEDICINE

## 2023-03-23 PROCEDURE — 25010000002 HEPARIN (PORCINE) PER 1000 UNITS: Performed by: NURSE PRACTITIONER

## 2023-03-23 PROCEDURE — 85025 COMPLETE CBC W/AUTO DIFF WBC: CPT | Performed by: INTERNAL MEDICINE

## 2023-03-23 PROCEDURE — 80053 COMPREHEN METABOLIC PANEL: CPT | Performed by: INTERNAL MEDICINE

## 2023-03-23 PROCEDURE — 84100 ASSAY OF PHOSPHORUS: CPT | Performed by: INTERNAL MEDICINE

## 2023-03-23 PROCEDURE — 25010000002 HEPARIN (PORCINE) PER 1000 UNITS: Performed by: INTERNAL MEDICINE

## 2023-03-23 PROCEDURE — 97162 PT EVAL MOD COMPLEX 30 MIN: CPT

## 2023-03-23 PROCEDURE — 94799 UNLISTED PULMONARY SVC/PX: CPT

## 2023-03-23 PROCEDURE — 82962 GLUCOSE BLOOD TEST: CPT

## 2023-03-23 PROCEDURE — 25010000002 ONDANSETRON PER 1 MG: Performed by: NURSE PRACTITIONER

## 2023-03-23 PROCEDURE — 99232 SBSQ HOSP IP/OBS MODERATE 35: CPT | Performed by: INTERNAL MEDICINE

## 2023-03-23 RX ORDER — TERAZOSIN 1 MG/1
1 CAPSULE ORAL EVERY 12 HOURS SCHEDULED
Status: DISCONTINUED | OUTPATIENT
Start: 2023-03-23 | End: 2023-03-25 | Stop reason: HOSPADM

## 2023-03-23 RX ADMIN — METOPROLOL TARTRATE 25 MG: 25 TABLET, FILM COATED ORAL at 11:31

## 2023-03-23 RX ADMIN — LATANOPROST 1 DROP: 50 SOLUTION/ DROPS OPHTHALMIC at 22:14

## 2023-03-23 RX ADMIN — METOCLOPRAMIDE 10 MG: 10 TABLET ORAL at 11:32

## 2023-03-23 RX ADMIN — ONDANSETRON 4 MG: 2 INJECTION INTRAMUSCULAR; INTRAVENOUS at 04:57

## 2023-03-23 RX ADMIN — TERAZOSIN HYDROCHLORIDE 1 MG: 1 CAPSULE ORAL at 11:31

## 2023-03-23 RX ADMIN — CETIRIZINE HYDROCHLORIDE 10 MG: 10 TABLET, FILM COATED ORAL at 11:32

## 2023-03-23 RX ADMIN — Medication 10 ML: at 11:32

## 2023-03-23 RX ADMIN — DEXTROSE MONOHYDRATE 25 G: 25 INJECTION, SOLUTION INTRAVENOUS at 04:50

## 2023-03-23 RX ADMIN — HEPARIN SODIUM 5000 UNITS: 5000 INJECTION INTRAVENOUS; SUBCUTANEOUS at 22:14

## 2023-03-23 RX ADMIN — OXYCODONE HYDROCHLORIDE 10 MG: 5 TABLET ORAL at 22:14

## 2023-03-23 RX ADMIN — Medication 10 ML: at 22:15

## 2023-03-23 RX ADMIN — DEXTROSE MONOHYDRATE 25 G: 25 INJECTION, SOLUTION INTRAVENOUS at 01:16

## 2023-03-23 RX ADMIN — HEPARIN SODIUM 5000 UNITS: 5000 INJECTION INTRAVENOUS; SUBCUTANEOUS at 04:57

## 2023-03-23 RX ADMIN — METOCLOPRAMIDE 10 MG: 10 TABLET ORAL at 22:14

## 2023-03-23 RX ADMIN — HEPARIN SODIUM 2000 UNITS: 1000 INJECTION INTRAVENOUS; SUBCUTANEOUS at 10:44

## 2023-03-23 RX ADMIN — METOPROLOL TARTRATE 25 MG: 25 TABLET, FILM COATED ORAL at 22:15

## 2023-03-23 RX ADMIN — HEPARIN SODIUM 5000 UNITS: 5000 INJECTION INTRAVENOUS; SUBCUTANEOUS at 15:00

## 2023-03-23 RX ADMIN — TERAZOSIN HYDROCHLORIDE 1 MG: 1 CAPSULE ORAL at 22:14

## 2023-03-23 NOTE — PROGRESS NOTES
"    [facility]   Daily Progress Note    Patient Name: Gerry Torres  : 1950  MRN: [id number]  Primary Care Physician:  Mary Le MD  Date of admission: 3/20/2023  Date and Time of Service: 3/23/2023 at 0 900      Subjective      Chief Complaint: Shortness of breath, abnormal labs    Patient Reports he feels okay    Subjective:  Symptoms:  No shortness of breath or chest pain.    Diet:  No nausea.    Activity level: Normal.    Pain:  He reports no pain.          Objective      Vitals:   Temp:  [97.6 °F (36.4 °C)-98.3 °F (36.8 °C)] 98 °F (36.7 °C)  Heart Rate:  [75-85] 84  Resp:  [14-17] 15  BP: (146-187)/(54-76) 173/76  Flow (L/min):  [2] 2  Objective:  General Appearance:  Comfortable.    Vital signs: (most recent): Blood pressure 173/76, pulse 84, temperature 98 °F (36.7 °C), temperature source Oral, resp. rate 15, height 182.9 cm (72\"), weight 134 kg (295 lb 6.7 oz), SpO2 94 %.    Output: Producing stool.    Lungs:  Normal effort and normal respiratory rate.  Breath sounds clear to auscultation.    Heart: Normal rate.  Regular rhythm.  S1 normal and S2 normal.    Extremities: There is dependent edema (1+ pretibial).    Neurological: Patient is alert and oriented to person, place and time.    Skin:  Warm and dry.                    Result Review    Result Review:  I have personally reviewed the results from the time of this admission to 3/23/2023 07:25 EDT and agree with these findings:  [x]  Laboratory  []  Microbiology  []  Radiology  []  EKG/Telemetry   []  Cardiology/Vascular   []  Pathology  []  Old records  []  Other:  Most notable findings include: Hemoglobin stable at 7.9, magnesium 1.5    Assessment & Plan      Brief Patient Summary:  Gerry Torres is a 72 y.o. male who was admitted with abnormal laboratory studies indicating new ESRD and initiation of hemodialysis after tunnel cath placement.  Has been tolerating dialysis without issue, unfortunately has " had acute hypoglycemic episodes with recurrence last night.  Dr. Palmer is following.      calcitriol, 0.5 mcg, Oral, Once per day on Mon Wed Fri  cetirizine, 10 mg, Oral, Daily  [START ON 3/24/2023] epoetin janes/janes-epbx, 15,000 Units, Subcutaneous, Once per day on Mon Wed Fri  heparin (porcine), 5,000 Units, Subcutaneous, Q8H  latanoprost, 1 drop, Both Eyes, Nightly  metoclopramide, 10 mg, Oral, BID  sodium chloride, 10 mL, Intravenous, Q12H             Active Hospital Problems:  Active Hospital Problems    Diagnosis    • **Acute kidney injury superimposed on CKD (HCC)      Plan:   Progression to ESRD with azotemia, hyperkalemia  -Renal following  -Tunnel cath placement 3/20/2023 for HD initiation, plan for fistula later this month  -follow K+, improved s/p HD  -Initiate PT/OT for discharge assessment     Shortness of breath, elevated BNP/elevated troponin, lower extremity edema  -Elevated troponin likely due to new onset ESRD  -Normal LV function and RV function on echocardiogram  -EKG without acute ST changes  -Stress test from [facility] 1/2023 negative for ischemia     Hypotension, history HTN  -Blood pressure trending up  -Resume lower dose home antihypertensive     Back pain, CVA tenderness  -UA culture negative  -Back pain resolving     Normocytic anemia of CKD  -epogen per renal     Thrombocytopenia  -Probably reactive, continue to trend     DM 1, hypoglycemic overnight   -d/c home insulin therapy  -on Glucommander protocol for insulin coverage  -consulted Dr. Palmer     Pancreatic cancer, renal mass s/p whipple procedure/splenectomy partial nephrectomy 2022     GREGG  -Home CPAP  - O2 hs while in hospital    Hypomagnesium  -Recheck serum level after dialysis    DVT prophylaxis:  Medical and mechanical DVT prophylaxis orders are present.    CODE STATUS:    Code Status (Patient has no pulse and is not breathing): CPR (Attempt to Resuscitate)  Medical Interventions (Patient has pulse or is  breathing): Full Support      Disposition:  I expect patient to be discharged tomorrow.    Plan of care discussed with patient, bedside nurse, attending MD    This patient has been examined wearing appropriate Personal Protective Equipment. 03/23/23      Electronically signed by CURT Garibay, 03/23/23, 07:25 EDT.  [facility]

## 2023-03-23 NOTE — NURSING NOTE
Rechecked patients blood sugar and it was 37. Gave an amp of D50 and patient's meal tray delivered. Rechecked patient's blood sugar at 1710 and it was 87.

## 2023-03-23 NOTE — PLAN OF CARE
Goal Outcome Evaluation:     Pt is a 71 y/o male who presents to [facility] with back pain, SOA, and 25lb weight gain. PMH significant for CKD, HTN, DM1, GREGG, neuroendocrine tumor, pancreatic CA w/ splenectomy, partial pancreatitis, and 10% of left kidney removal. Pt was diagnosed with new ESRD and has begun new HD after tunnel cath placement. At [facility] pt was independent with ambulation w/out an AD and was independent with driving. At this time pt appears to have minimal trunk instability during ambulation x110ft w/out use of an AD. Pt has a supportive spouse who encourages him to be more mobile. Recommend return home with assist from spouse and no further skilled PT is needed.

## 2023-03-23 NOTE — PLAN OF CARE
Goal Outcome Evaluation:              Outcome Evaluation: 72 y.o. male with CKD, hypertension, diabetes mellitus type 1, GREGG, neuroendocrine tumor, pancreatic cancer with splenectomy, partial pancreatitis, and 10% of left kidney removal who presented to [facility] on 3/20/2023 complaining of increased shortness of breath over the last 3 months in addition to 25 pound weight gain.  Pt hypoglycemic when presenting to hospital.  He demos ability to complete mobility without assist and reports he has been able to use toilet in room on his own. Requires assist to don socks due to body habitus, though wife able to assist.  Recommend return home with wife at discharge.  No further OT needs.

## 2023-03-23 NOTE — PLAN OF CARE
Goal Outcome Evaluation:                 Patient was getting hourly glucose checks, now Q2. Blood sugars improved. Dialysis scheduled for tomorrow.

## 2023-03-23 NOTE — PROGRESS NOTES
Daily Progress Note    Patient Care Team:  Mary Le MD as PCP - General (Family Medicine)  Fernando Burch MD as Consulting Physician (Hematology and Oncology)    Chief Complaint: Follow-up type 2 diabetes    HPI: Patient seen and examined today.  Admitted with hypoglycemia.  He is off all the insulins and eating well.  Blood sugars are now rising.    ROS:   Constitutional:  Denies fatigue, tiredness.    Respiratory: denies cough, shortness of breath.   Cardiovascular:  denies chest pain, edema   GI:  Denies abdominal pain, nausea, vomiting.       Vitals:    03/23/23 1058   BP: 154/77   Pulse: 89   Resp: 16   Temp:    SpO2: 93%     Body mass index is 40.07 kg/m².    Physical Exam:  GEN: NAD, conversant  PSYCH: Awake and coherent.      Results Review:     I reviewed the patient's new clinical results.    Glucose   Date Value Ref Range Status   03/23/2023 113 (H) 65 - 99 mg/dL Final     Sodium   Date Value Ref Range Status   03/23/2023 138 136 - 145 mmol/L Final     Potassium   Date Value Ref Range Status   03/23/2023 4.8 3.5 - 5.2 mmol/L Final     CO2   Date Value Ref Range Status   03/23/2023 24.0 22.0 - 29.0 mmol/L Final     Chloride   Date Value Ref Range Status   03/23/2023 101 98 - 107 mmol/L Final     Anion Gap   Date Value Ref Range Status   03/23/2023 13.0 5.0 - 15.0 mmol/L Final     Creatinine   Date Value Ref Range Status   03/23/2023 5.27 (H) 0.76 - 1.27 mg/dL Final     BUN   Date Value Ref Range Status   03/23/2023 67 (H) 8 - 23 mg/dL Final     BUN/Creatinine Ratio   Date Value Ref Range Status   03/23/2023 12.7 7.0 - 25.0 Final     Calcium   Date Value Ref Range Status   03/23/2023 7.8 (L) 8.6 - 10.5 mg/dL Final     Alkaline Phosphatase   Date Value Ref Range Status   03/23/2023 97 39 - 117 U/L Final     Total Protein   Date Value Ref Range Status   03/23/2023 5.8 (L) 6.0 - 8.5 g/dL Final     ALT (SGPT)   Date Value Ref Range Status   03/23/2023 8 1 - 41 U/L Final     AST (SGOT)   Date  Value Ref Range Status   03/23/2023 20 1 - 40 U/L Final     Total Bilirubin   Date Value Ref Range Status   03/23/2023 0.2 0.0 - 1.2 mg/dL Final     Albumin   Date Value Ref Range Status   03/23/2023 3.6 3.5 - 5.2 g/dL Final     Globulin   Date Value Ref Range Status   03/23/2023 2.2 gm/dL Final     Magnesium   Date Value Ref Range Status   03/23/2023 1.5 (L) 1.6 - 2.4 mg/dL Final     Phosphorus   Date Value Ref Range Status   03/23/2023 5.6 (H) 2.5 - 4.5 mg/dL Final     Lab Results   Component Value Date    HGBA1C 6.40 (H) 03/22/2023    HGBA1C 6.90 (H) 03/21/2023    HGBA1C 6.8 (H) 09/14/2020     No results found for: GLUF, MICROALBUR  Results from last 7 days   Lab Units 03/23/23  1307 03/23/23  1216 03/23/23  1056 03/23/23  0954 03/23/23  0902 03/23/23  0755   GLUCOSE mg/dL 170* 126* 82 82 87 77             Medication Review: Reviewed.     calcitriol, 0.5 mcg, Oral, Once per day on Mon Wed Fri  cetirizine, 10 mg, Oral, Daily  [START ON 3/24/2023] epoetin janes/janes-epbx, 15,000 Units, Subcutaneous, Once per day on Mon Wed Fri  heparin (porcine), 5,000 Units, Subcutaneous, Q8H  latanoprost, 1 drop, Both Eyes, Nightly  metoclopramide, 10 mg, Oral, BID  metoprolol tartrate, 25 mg, Oral, Q12H  sodium chloride, 10 mL, Intravenous, Q12H  terazosin, 1 mg, Oral, Q12H              Assessment and plan:  Hypoglycemia: Resolved    Diabetes mellitus type 2 with hyperglycemia: He is off all the insulins and blood sugars are stable, rising at this time.  We will hold off any medications and follow blood sugars.  Once her blood sugars are higher than 180 on a consistent basis I will consider resuming insulin on a lower dose.    Acute kidney injury superimposed on CKD: Followed by nephrology.    Augusta Palmer MD. FACE

## 2023-03-23 NOTE — THERAPY EVALUATION
Patient Name: Gerry Torres  : 1950    MRN: [id number]                              Today's Date: 3/23/2023       Admit Date: 3/20/2023    Visit Dx:     ICD-10-CM ICD-9-CM   1. Acute kidney injury superimposed on CKD (HCC)  N17.9 584.9    N18.9 585.9   2. Dehydration, moderate  E86.0 276.51   3. Primary pancreatic neuroendocrine tumor  D3A.8 209.69   4. 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)   • Vitamin D deficiency   • Type 1 diabetes mellitus with hyperglycemia (HCC)   • Family history of diabetes mellitus   • Acute on chronic kidney failure (HCC)   • Acute kidney injury superimposed on CKD (HCC)     Past Medical History:   Diagnosis Date   • Diabetes mellitus (HCC)    • Hyperlipidemia    • Hypertension    • Sleep apnea      Past Surgical History:   Procedure Laterality Date   • HERNIA REPAIR     • KNEE ARTHROSCOPY Left    • SPLENECTOMY     • TONSILLECTOMY     • VITRECTOMY PARS PLANA Right 10/22/2019    Procedure: 25G VITRECTOMY-ENDOLASER;  Surgeon: Lazarus, Howard S., MD;  Location: [facility] MAIN OR;  Service: Ophthalmology      General Information     Row Name 23 1600          Physical Therapy Time and Intention    Document Type evaluation  -PONCHO     Mode of Treatment physical therapy  -PONCHO     Row Name 23 1600          General Information    Prior Level of Function independent:;community mobility;gait;transfer;bed mobility;driving  Pt did not utilize an AD previously  -PONCHO     Existing Precautions/Restrictions no known precautions/restrictions  -PONCHO     Barriers to Rehab none identified  -PONCHO     Row Name 23 1600          Living Environment    People in Home spouse  -PONCHO     Row Name 23 1600           Home Main Entrance    Number of Stairs, Main Entrance none  -     Row Name 03/23/23 1600          Stairs Within Home, Primary    Stairs, Within Home, Primary Basement, but does not need to utilize, though would like to  -     Row Name 03/23/23 1600          Cognition    Orientation Status (Cognition) oriented x 4  -     Row Name 03/23/23 1600          Safety Issues, Functional Mobility    Impairments Affecting Function (Mobility) balance;endurance/activity tolerance  -           User Key  (r) = Recorded By, (t) = Taken By, (c) = Cosigned By    Initials Name Provider Type    Sandi Bazzi, PT Physical Therapist               Mobility     Row Name 03/23/23 1602          Bed Mobility    Bed Mobility bed mobility (all) activities  -     All Activities, [place] (Bed Mobility) modified independence  -     Assistive Device (Bed Mobility) bed rails  -     Row Name 03/23/23 1602          Sit-Stand Transfer    Sit-Stand [place] (Transfers) standby assist  -     Comment, (Sit-Stand Transfer) STS from EOB  -     Row Name 03/23/23 1602          Gait/Stairs (Locomotion)    [place] Level (Gait) standby assist  -     Distance in Feet (Gait) 110ft w/out an AD; minimal trunk instability, no complete LOB  -PONCHO     Deviations/Abnormal Patterns (Gait) antalgic;gait speed decreased  -           User Key  (r) = Recorded By, (t) = Taken By, (c) = Cosigned By    Initials Name Provider Type    Sandi Bazzi, PT Physical Therapist               Obj/Interventions     Row Name 03/23/23 1603          Range of Motion Comprehensive    General Range of Motion no range of motion deficits identified  -     Row Name 03/23/23 1603          Strength Comprehensive (MMT)    General Manual Muscle Testing (MMT) Assessment no strength deficits identified  -     Comment, General Manual Muscle Testing (MMT) Assessment BLE grossly 4+/5  -     Row Name 03/23/23 1603          Balance    Balance Assessment  sitting static balance;sitting dynamic balance;standing static balance;standing dynamic balance  -     Static Sitting Balance independent  -     Dynamic Sitting Balance independent  -     Position, Sitting Balance sitting edge of bed  -     Static Standing Balance supervision  -     Dynamic Standing Balance standby assist  -     Position/Device Used, Standing Balance unsupported  -           User Key  (r) = Recorded By, (t) = Taken By, (c) = Cosigned By    Initials Name Provider Type    Sandi Bazzi, PT Physical Therapist               Goals/Plan    No documentation.                Clinical Impression     Row Name 03/23/23 1603          Pain    Additional Documentation Pain Scale: FACES Pre/Post-Treatment (Group)  -     Row Name 03/23/23 1603          Pain Scale: FACES Pre/Post-Treatment    Pain: FACES Scale, Pretreatment 0-->no hurt  -     Posttreatment Pain Rating 0-->no hurt  -[facility] Name 03/23/23 1603          Plan of Care Review    Plan of Care Reviewed With patient;spouse  -     Outcome Evaluation Pt is a 73 y/o male who presents to [facility] with back pain, SOA, and 25lb weight gain. PMH significant for CKD, HTN, DM1, GREGG, neuroendocrine tumor, pancreatic CA w/ splenectomy, partial pancreatitis, and 10% of left kidney removal. Pt was diagnosed with new ESRD and has begun new HD after tunnel cath placement. At [facility] pt was independent with ambulation w/out an AD and was independent with driving. At this time pt appears to have minimal trunk instability during ambulation x110ft w/out use of an AD. Pt has a supportive spouse who encourages him to be more mobile. Recommend return home with assist from spouse and no further skilled PT is needed.  -     Row Name 03/23/23 1603          Therapy Assessment/Plan (PT)    Criteria for Skilled Interventions Met (PT) no;no problems identified which require skilled intervention  -     Therapy Frequency (PT) evaluation only  -     Row Name  03/23/23 1603          Vital Signs    O2 Delivery Pre Treatment room air  -PONCHO     O2 Delivery Intra Treatment room air  -PONCHO     O2 Delivery Post Treatment room air  -PONCHO     Row Name 03/23/23 1603          Positioning and Restraints    Pre-Treatment Position in bed  -PONCHO     Post Treatment Position bed  -PONCHO     In Bed sitting EOB;call light within reach;encouraged to call for assist;with family/caregiver  -PONCHO           User Key  (r) = Recorded By, (t) = Taken By, (c) = Cosigned By    Initials Name Provider Type    Sandi Bazzi, PT Physical Therapist               Outcome Measures     Row Name 03/23/23 1605 03/23/23 1215       How much help from another person do you currently need...    Turning from your back to your side while in flat bed without using bedrails? 4  -PONCHO 3  -ES    Moving from lying on back to sitting on the side of a flat bed without bedrails? 3  -PONCHO 3  -ES    Moving to and from a bed to a chair (including a wheelchair)? 3  -PONCHO 3  -ES    Standing up from a chair using your arms (e.g., wheelchair, bedside chair)? 3  -PONCHO 3  -ES    Climbing 3-5 steps with a railing? 3  -PONCHO 3  -ES    To walk in hospital room? 3  -PONCHO 3  -ES    AM-PAC 6 Clicks Score (PT) 19  -PONCHO 18  -ES    Highest level of mobility 6 --> Walked 10 steps or more  -PONCHO 6 --> Walked 10 steps or more  -ES    Row Name 03/23/23 1605          Functional Assessment    Outcome Measure Options AM-PAC 6 Clicks Basic Mobility (PT)  -           User Key  (r) = Recorded By, (t) = Taken By, (c) = Cosigned By    Initials Name Provider Type    Sandi Bazzi, PT Physical Therapist    Megan Fletcher, RN Registered Nurse                             Physical Therapy Education     Title: PT OT SLP Therapies (In Progress)     Topic: Physical Therapy (In Progress)     Point: Mobility training (Done)     Learning Progress Summary           Patient Acceptance, E,TB, VU by PONCHO at 3/23/2023 1605                   Point: Home exercise program (Not  Started)     Learner Progress:  Not documented in this visit.          Point: Body mechanics (Done)     Learning Progress Summary           Patient Acceptance, E,TB, VU by  at 3/23/2023 1605                   Point: Precautions (Done)     Learning Progress Summary           Patient Acceptance, E,TB, VU by  at 3/23/2023 1605                               User Key     Initials Effective Dates Name Provider Type Discipline     08/23/21 -  Sandi Ford, PT Physical Therapist PT              PT Recommendation and Plan     Plan of Care Reviewed With: patient, spouse  Outcome Evaluation: Pt is a 71 y/o male who presents to [facility] with back pain, SOA, and 25lb weight gain. PMH significant for CKD, HTN, DM1, GREGG, neuroendocrine tumor, pancreatic CA w/ splenectomy, partial pancreatitis, and 10% of left kidney removal. Pt was diagnosed with new ESRD and has begun new HD after tunnel cath placement. At [facility] pt was independent with ambulation w/out an AD and was independent with driving. At this time pt appears to have minimal trunk instability during ambulation x110ft w/out use of an AD. Pt has a supportive spouse who encourages him to be more mobile. Recommend return home with assist from spouse and no further skilled PT is needed.     Time Calculation:    PT Charges     Row Name 03/23/23 1605             Time Calculation    Start Time 1454  -PONCHO      Stop Time 1508  -PONCHO      Time Calculation (min) 14 min  -PONCHO      PT Received On 03/23/23  -            User Key  (r) = Recorded By, (t) = Taken By, (c) = Cosigned By    Initials Name Provider Type     Sandi Ford, PT Physical Therapist              Therapy Charges for Today     Code Description Service Date Service Provider Modifiers Qty    67585738829 HC PT EVAL MOD COMPLEXITY 3 3/23/2023 Sandi Ford, PT GP 1          PT G-Codes  Outcome Measure Options: AM-PAC 6 Clicks Basic Mobility (PT)  AM-PAC 6 Clicks Score (PT): 19  PT Discharge Summary  Anticipated  Discharge Disposition (PT): home with assist    Sandi Ford, PT  3/23/2023

## 2023-03-23 NOTE — CASE MANAGEMENT/SOCIAL WORK
Continued Stay Note  ZAYNAB Dunham     Patient Name: Gerry Torres  MRN: [id number]  Today's Date: 3/23/2023    Admit Date: 3/20/2023    Plan: D/C plan: Home with spouse. New OP HD set up: [facility]. Start date changed to Monday 3/27. Watch for oxygen needs.   Discharge Plan     Row Name 03/23/23 1624       Plan    Plan D/C plan: Home with spouse. New OP HD set up: [facility]. Start date changed to Monday 3/27. Watch for oxygen needs.    Patient/Family in Agreement with Plan yes    Plan Comments Pt not going to d/c today per MD due to close glucose monitoring. MEGAN contacted [facility] at [phone] to reschedule. No answer, MEGAN LVM with details to change friday appt to Monday. CM requested call back from facility to confirm change. Per MD, anticipate d/c tomorrow. Pt will need HD prior to d/c on friday.                        Phone communication or documentation only - no physical contact with patient or family.      Jack Keller RN

## 2023-03-23 NOTE — PROGRESS NOTES
: the pt. Scheduled h.d on [facility] out patient will do h.d in am and can be d/c after h.d    Vital Signs  Vitals:    03/23/23 1058   BP: 154/77   Pulse: 89   Resp: 16   Temp:    SpO2: 93%     I/O this shift:  In: -   Out: 3000 [Other:3000]  I/O last 3 completed shifts:  In: 650 [P.O.:600; IV Piggyback:50]  Out: 200 [Urine:200]      Physical Exam:    General:   HEENT:  Normocephalic, Atraumatic, EOMI, PERRLA, NO icterus,  Neck:  Supple, No JVD, No Carotid artery bruit, No lymphadenopathy  Chest: Clear to auscultation bilaterally,  Cardiovascular: Regular rate and rhythm. Positive S1 & S2, No rub, murmur or gallop.  Abdominal: Soft, nontender, no palpable organomegaly, no abdominal bruit, positive bowel sounds  Musculoskeletal: No joint tenderness or swelling, good range of motion, Adequate muscle strength on both sides, no cyanosis, clubbing or edema on lower extremities.  Neuro: Alert oriented x3, CN1 - 12 intact, No focal sensory or motor deficit.      Review of Systems:   CNS: Denied headaches, blurred vision, tingling or numbness in any part of body. No weakness or any impaired speech.  CVS: No chest pain or shortness of breath, No orthopnea or PND or exertional dyspnea,  Pulmonary: Denies shortness of breath, coughs, hematemesis, night sweats or weight loss.  GI: Denies diarrhea, nausea, vomiting. Denies weight loss, hematemesis, melena.  : Denies dysuria, frequency or hesitancy of urination  Vascular: Denies claudication, resting pain, tingling numbness or weakness in any part of the body.  Musculoskeletal: Denies Joint tenderness or pain, denies stiffness in joints, denies fever or weight loss, or skin rashes.    Current Labs  [unfilled]  Lab Results (last 24 hours)     Procedure Component Value Units Date/Time    POC Glucose Once [[id number]]  (Normal) Collected: 03/23/23 1056    Specimen: Blood Updated: 03/23/23 1059     Glucose 82 mg/dL      Comment: Serial Number: [id number]:  [id number]       POC  Glucose Once [[id number]]  (Normal) Collected: 03/23/23 0954    Specimen: Blood Updated: 03/23/23 0955     Glucose 82 mg/dL      Comment: Serial Number: [id number]:  [id number]       POC Glucose Once [[id number]]  (Normal) Collected: 03/23/23 0902    Specimen: Blood Updated: 03/23/23 0903     Glucose 87 mg/dL      Comment: Serial Number: [id number]:  [id number]       POC Glucose Once [[id number]]  (Normal) Collected: 03/23/23 0755    Specimen: Blood Updated: 03/23/23 0757     Glucose 77 mg/dL      Comment: Serial Number: [id number]:  [id number]       POC Glucose Once [[id number]]  (Normal) Collected: 03/23/23 0653    Specimen: Blood Updated: 03/23/23 0655     Glucose 102 mg/dL      Comment: Serial Number: [id number]:  [id number]       POC Glucose Once [[id number]]  (Abnormal) Collected: 03/23/23 0551    Specimen: Blood Updated: 03/23/23 0552     Glucose 107 mg/dL      Comment: Serial Number: [id number]:  [id number]       POC Glucose Once [[id number]]  (Abnormal) Collected: 03/23/23 0444    Specimen: Blood Updated: 03/23/23 0445     Glucose 61 mg/dL      Comment: Serial Number: [id number]:  [id number]       POC Glucose Once [[id number]]  (Normal) Collected: 03/23/23 0345    Specimen: Blood Updated: 03/23/23 0347     Glucose 74 mg/dL      Comment: Serial Number: [id number]:  [id number]       POC Glucose Once [[id number]]  (Normal) Collected: 03/23/23 0254    Specimen: Blood Updated: 03/23/23 0255     Glucose 76 mg/dL      Comment: Serial Number: [id number]:  [id number]       POC Glucose Once [[id number]]  (Normal) Collected: 03/23/23 0157    Specimen: Blood Updated: 03/23/23 0158     Glucose 92 mg/dL      Comment: Serial Number: [id number]:  [id number]       POC Glucose Once [[id number]]  (Normal) Collected: 03/23/23 0149    Specimen: Blood Updated: 03/23/23 0151     Glucose 103 mg/dL      Comment: Serial Number: [id number]:  [id number]       Comprehensive Metabolic  Panel [[id number]]  (Abnormal) Collected: 03/23/23 0004    Specimen: Blood from Arm, Left Updated: 03/23/23 0137     Glucose 113 mg/dL      BUN 67 mg/dL      Creatinine 5.27 mg/dL      Sodium 138 mmol/L      Potassium 4.8 mmol/L      Chloride 101 mmol/L      CO2 24.0 mmol/L      Calcium 7.8 mg/dL      Total Protein 5.8 g/dL      Albumin 3.6 g/dL      ALT (SGPT) 8 U/L      AST (SGOT) 20 U/L      Alkaline Phosphatase 97 U/L      Total Bilirubin 0.2 mg/dL      Globulin 2.2 gm/dL      A/G Ratio 1.6 g/dL      BUN/Creatinine Ratio 12.7     Anion Gap 13.0 mmol/L      eGFR 11.0 mL/min/1.73      Comment: <15 Indicative of kidney failure       Narrative:      GFR Normal >60  Chronic Kidney Disease <60  Kidney Failure <15    The GFR formula is only valid for adults with stable renal function between ages 18 and 70.    Phosphorus [[id number]]  (Abnormal) Collected: 03/23/23 0004    Specimen: Blood from Arm, Left Updated: 03/23/23 0137     Phosphorus 5.6 mg/dL     Magnesium [[id number]]  (Abnormal) Collected: 03/23/23 0004    Specimen: Blood from Arm, Left Updated: 03/23/23 0137     Magnesium 1.5 mg/dL     CBC & Differential [[id number]]  (Abnormal) Collected: 03/23/23 0004    Specimen: Blood from Arm, Left Updated: 03/23/23 0116    Narrative:      The following orders were created for panel order CBC & Differential.  Procedure                               Abnormality         Status                     ---------                               -----------         ------                     CBC Auto Differential[[id number]]        Abnormal            Final result                 Please view results for these tests on the individual orders.    CBC Auto Differential [[id number]]  (Abnormal) Collected: 03/23/23 0004    Specimen: Blood from Arm, Left Updated: 03/23/23 0116     WBC 10.60 10*3/mm3      RBC 2.62 10*6/mm3      Hemoglobin 7.9 g/dL      Hematocrit 24.2 %      MCV 92.3 fL      MCH 30.0 pg      MCHC 32.5 g/dL      RDW 14.6 %       RDW-SD 49.9 fl      MPV 11.1 fL      Platelets 127 10*3/mm3      Neutrophil % 64.7 %      Lymphocyte % 14.4 %      Monocyte % 17.4 %      Eosinophil % 3.1 %      Basophil % 0.4 %      Neutrophils, Absolute 6.80 10*3/mm3      Lymphocytes, Absolute 1.50 10*3/mm3      Monocytes, Absolute 1.80 10*3/mm3      Eosinophils, Absolute 0.30 10*3/mm3      Basophils, Absolute 0.00 10*3/mm3      nRBC 0.5 /100 WBC     POC Glucose Once [[id number]]  (Abnormal) Collected: 03/23/23 0111    Specimen: Blood Updated: 03/23/23 0113     Glucose 68 mg/dL      Comment: Serial Number: [id number]:  [id number]       POC Glucose Once [[id number]]  (Normal) Collected: 03/23/23 0002    Specimen: Blood Updated: 03/23/23 0003     Glucose 103 mg/dL      Comment: Serial Number: [id number]:  [id number]       POC Glucose Once [[id number]]  (Abnormal) Collected: 03/22/23 2338    Specimen: Blood Updated: 03/22/23 2347     Glucose 40 mg/dL      Comment: Serial Number: [id number]:  [id number]       POC Glucose Once [[id number]]  (Normal) Collected: 03/22/23 2209    Specimen: Blood Updated: 03/22/23 2210     Glucose 87 mg/dL      Comment: Serial Number: [id number]:  [id number]       POC Glucose Once [[id number]]  (Normal) Collected: 03/22/23 2115    Specimen: Blood Updated: 03/22/23 2116     Glucose 101 mg/dL      Comment: Serial Number: [id number]:  [id number]       POC Glucose Once [[id number]]  (Abnormal) Collected: 03/22/23 2009    Specimen: Blood Updated: 03/22/23 2010     Glucose 106 mg/dL      Comment: Serial Number: [id number]:  [id number]       POC Glucose Once [[id number]]  (Normal) Collected: 03/22/23 1913    Specimen: Blood Updated: 03/22/23 1916     Glucose 79 mg/dL      Comment: Serial Number: [id number]:  [id number]       POC Glucose Once [[id number]]  (Normal) Collected: 03/22/23 1710    Specimen: Blood Updated: 03/22/23 1711     Glucose 87 mg/dL      Comment: Serial Number: [id number]:  [id number]        POC Glucose Once [[id number]]  (Abnormal) Collected: 03/22/23 1647    Specimen: Blood Updated: 03/22/23 1649     Glucose 37 mg/dL      Comment: Serial Number: [id number]:  [id number]       POC Glucose Once [[id number]]  (Abnormal) Collected: 03/22/23 1629    Specimen: Blood Updated: 03/22/23 1630     Glucose 32 mg/dL      Comment: Serial Number: [id number]:  [id number]           Current Radiology  Imaging Results (Last 24 Hours)     ** No results found for the last 24 hours. **        Current Meds    Current Facility-Administered Medications:   •  acetaminophen (TYLENOL) tablet 650 mg, 650 mg, Oral, Q4H PRN **OR** acetaminophen (TYLENOL) 160 MG/5ML solution 650 mg, 650 mg, Oral, Q4H PRN **OR** acetaminophen (TYLENOL) suppository 650 mg, 650 mg, Rectal, Q4H PRN, Keiry Riddle APRN  •  albumin human 25 % IV SOLN 12.5 g, 12.5 g, Intravenous, Q1H PRN, Garrett King MD  •  calcitriol (ROCALTROL) capsule 0.5 mcg, 0.5 mcg, Oral, Once per day on Mon Wed Fri, Keiry Riddle APRN, 0.5 mcg at 03/22/23 0910  •  cetirizine (zyrTEC) tablet 10 mg, 10 mg, Oral, Daily, Keiry Riddle APRN, 10 mg at 03/23/23 1132  •  dextrose (D50W) (25 g/50 mL) IV injection 10-50 mL, 10-50 mL, Intravenous, Q15 Min PRN, Meme Leach DO  •  dextrose (D50W) (25 g/50 mL) IV injection 25 g, 25 g, Intravenous, Q15 Min PRN, Gabriel Casanova MD, 25 g at 03/23/23 0450  •  dextrose (GLUTOSE) oral gel 15 g, 15 g, Oral, Q15 Min PRN, Gabriel Casanova MD  •  [START ON 3/24/2023] epoetin janes-epbx (RETACRIT) injection 15,000 Units, 15,000 Units, Subcutaneous, Once per day on Mon Wed Fri, Altagracia Peoples MD  •  glucagon (human recombinant) (GLUCAGEN DIAGNOSTIC) 1 mg in sterile water (preservative free) 1 mL injection, 1 mg, Intramuscular, Q15 Min PRN, Gabriel Casanova MD  •  heparin (porcine) 5000 UNIT/ML injection 5,000 Units, 5,000 Units, Subcutaneous, Q8H, Keiry Riddle APRN, 5,000 Units at 03/23/23 0457  •  heparin (porcine) injection 2,000  Units, 2,000 Units, Intracatheter, PRN, Garrett King MD, 2,000 Units at 03/23/23 1044  •  heparin (porcine) injection 5,000 Units, 5,000 Units, Intracatheter, PRN, Garrett King MD, 4,500 Units at 03/21/23 1413  •  hydrALAZINE (APRESOLINE) injection 10 mg, 10 mg, Intravenous, Q6H PRN, Gabriel Casanova MD  •  HYDROcodone-acetaminophen (NORCO) 5-325 MG per tablet 1 tablet, 1 tablet, Oral, Q4H PRN, Keiry Riddle APRN, 1 tablet at 03/20/23 2251  •  latanoprost (XALATAN) 0.005 % ophthalmic solution 1 drop, 1 drop, Both Eyes, Nightly, Keiry Riddle APRN, 1 drop at 03/22/23 2234  •  metoclopramide (REGLAN) tablet 10 mg, 10 mg, Oral, BID, Keiry Riddle APRN, 10 mg at 03/23/23 1132  •  metoprolol tartrate (LOPRESSOR) tablet 25 mg, 25 mg, Oral, Q12H, Keiry Riddle APRN, 25 mg at 03/23/23 1131  •  midodrine (PROAMATINE) tablet 5 mg, 5 mg, Oral, TID PRN, Gabriel Casanova MD  •  morphine injection 2 mg, 2 mg, Intravenous, Q4H PRN, Gabriel Casanova MD, 2 mg at 03/21/23 2113  •  naloxone (NARCAN) injection 0.4 mg, 0.4 mg, Intravenous, Q5 Min PRN, Gabriel Casanova MD  •  nitroglycerin (NITROSTAT) SL tablet 0.4 mg, 0.4 mg, Sublingual, Q5 Min PRN, Keiry Riddle APRN  •  ondansetron (ZOFRAN) tablet 4 mg, 4 mg, Oral, Q6H PRN **OR** ondansetron (ZOFRAN) injection 4 mg, 4 mg, Intravenous, Q6H PRN, Keiry Riddle APRN, 4 mg at 03/23/23 0097  •  oxyCODONE (ROXICODONE) immediate release tablet 10 mg, 10 mg, Oral, Q4H PRN, Gabriel Casanova MD, 10 mg at 03/21/23 1544  •  [COMPLETED] Insert Peripheral IV, , , Once **AND** sodium chloride 0.9 % flush 10 mL, 10 mL, Intravenous, PRN, Keiry Riddle, APRN  •  sodium chloride 0.9 % flush 10 mL, 10 mL, Intravenous, Q12H, Keiry Riddle APRN, 10 mL at 03/23/23 1132  •  sodium chloride 0.9 % flush 10 mL, 10 mL, Intravenous, PRN, Keiry Riddle, APRN  •  sodium chloride 0.9 % infusion 40 mL, 40 mL, Intravenous, PRN, Keiry Riddle APRN  •  terazosin (HYTRIN) capsule 1 mg, 1 mg, Oral,  Q12H, Keiry Riddle APRN, 1 mg at 03/23/23 1131              Patient Active Problem List   Diagnosis   • Family history of malignant neoplasm of trachea, bronchus and lung   • Hyperlipidemia   • Hypertension   • Onychomycosis of toenail   • 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        Altagracia Peoples MD FACP, FASN.  03/23/23  12:17 EDT

## 2023-03-23 NOTE — PLAN OF CARE
Problem: Adult Inpatient Plan of Care  Goal: Plan of Care Review  Outcome: Ongoing, Progressing  Goal: Patient-Specific Goal (Individualized)  Outcome: Ongoing, Progressing  Goal: Absence of Hospital-Acquired Illness or Injury  Outcome: Ongoing, Progressing  Intervention: Identify and Manage Fall Risk  Recent Flowsheet Documentation  Taken 3/23/2023 0400 by Sasha Cho RN  Safety Promotion/Fall Prevention: safety round/check completed  Taken 3/23/2023 0200 by Sasha Cho RN  Safety Promotion/Fall Prevention: safety round/check completed  Taken 3/23/2023 0000 by Sasha Cho RN  Safety Promotion/Fall Prevention: safety round/check completed  Taken 3/22/2023 2200 by Sasha Cho RN  Safety Promotion/Fall Prevention: safety round/check completed  Taken 3/22/2023 2000 by Sasha Cho RN  Safety Promotion/Fall Prevention: safety round/check completed  Intervention: Prevent and Manage VTE (Venous Thromboembolism) Risk  Recent Flowsheet Documentation  Taken 3/22/2023 2000 by Sasha Cho RN  VTE Prevention/Management: compression stockings off  Intervention: Prevent Infection  Recent Flowsheet Documentation  Taken 3/23/2023 0400 by Sasha Cho RN  Infection Prevention:   single patient room provided   rest/sleep promoted   hand hygiene promoted   equipment surfaces disinfected   environmental surveillance performed  Taken 3/23/2023 0200 by Sasha Cho RN  Infection Prevention: single patient room provided  Taken 3/23/2023 0000 by Sasha Cho RN  Infection Prevention: single patient room provided  Taken 3/22/2023 2200 by Sasha Cho RN  Infection Prevention: single patient room provided  Taken 3/22/2023 2000 by Sasha Cho RN  Infection Prevention:   single patient room provided   rest/sleep promoted   hand hygiene promoted   equipment surfaces disinfected   environmental surveillance performed  Goal: Optimal Comfort and  Wellbeing  Outcome: Ongoing, Progressing  Intervention: Provide Person-Centered Care  Recent Flowsheet Documentation  Taken 3/22/2023 2000 by Sasha Cho RN  Trust Relationship/Rapport:   care explained   questions answered   questions encouraged  Goal: Readiness for Transition of Care  Outcome: Ongoing, Progressing     Problem: Pain Acute  Goal: Acceptable Pain Control and Functional Ability  Outcome: Ongoing, Progressing  Intervention: Prevent or Manage Pain  Recent Flowsheet Documentation  Taken 3/23/2023 0400 by Sasha Cho RN  Medication Review/Management: medications reviewed  Taken 3/23/2023 0200 by Sasha Cho RN  Medication Review/Management: medications reviewed  Taken 3/23/2023 0000 by Sasha Cho RN  Medication Review/Management: medications reviewed  Taken 3/22/2023 2200 by Sasha Cho RN  Medication Review/Management: medications reviewed  Taken 3/22/2023 2000 by Sasha Cho RN  Medication Review/Management: medications reviewed  Intervention: Optimize Psychosocial Wellbeing  Recent Flowsheet Documentation  Taken 3/22/2023 2000 by Sasha Cho RN  Diversional Activities:   television   [employer]     Problem: Diabetes Comorbidity  Goal: Blood Glucose Level Within Targeted Range  Outcome: Ongoing, Progressing     Problem: Hypertension Comorbidity  Goal: Blood Pressure in Desired Range  Outcome: Ongoing, Progressing  Intervention: Maintain Blood Pressure Management  Recent Flowsheet Documentation  Taken 3/23/2023 0400 by Sasha Cho RN  Medication Review/Management: medications reviewed  Taken 3/23/2023 0200 by Sasha Cho RN  Medication Review/Management: medications reviewed  Taken 3/23/2023 0000 by Sasha Cho RN  Medication Review/Management: medications reviewed  Taken 3/22/2023 2200 by Sasha Cho RN  Medication Review/Management: medications reviewed  Taken 3/22/2023 2000 by Sasha Cho RN  Medication  Review/Management: medications reviewed   Goal Outcome Evaluation:      Patient is still hypoglycemic d50 being administerd along with apple juice and patient having BS checked hourly.  Patient is asymptomatic to low blood sugars this shift.

## 2023-03-23 NOTE — THERAPY EVALUATION
Patient Name: Gerry Torres  : 1950    MRN: [id number]                              Today's Date: 3/23/2023       Admit Date: 3/20/2023    Visit Dx:     ICD-10-CM ICD-9-CM   1. Acute kidney injury superimposed on CKD (HCC)  N17.9 584.9    N18.9 585.9   2. Dehydration, moderate  E86.0 276.51   3. Primary pancreatic neuroendocrine tumor  D3A.8 209.69   4. 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)   • Vitamin D deficiency   • Type 1 diabetes mellitus with hyperglycemia (HCC)   • Family history of diabetes mellitus   • Acute on chronic kidney failure (HCC)   • Acute kidney injury superimposed on CKD (HCC)     Past Medical History:   Diagnosis Date   • Diabetes mellitus (HCC)    • Hyperlipidemia    • Hypertension    • Sleep apnea      Past Surgical History:   Procedure Laterality Date   • HERNIA REPAIR     • KNEE ARTHROSCOPY Left    • SPLENECTOMY     • TONSILLECTOMY     • VITRECTOMY PARS PLANA Right 10/22/2019    Procedure: 25G VITRECTOMY-ENDOLASER;  Surgeon: Lazarus, Howard S., MD;  Location: [facility] OR;  Service: Ophthalmology      General Information     Row Name 23 1603          OT Time and Intention    Document Type evaluation  -SR     Mode of Treatment occupational therapy  -SR     Row Name 23 1603          General Information    Existing Precautions/Restrictions no known precautions/restrictions  -SR     Row Name 23 1603          Occupational Profile    Reason for Services/Referral (Occupational Profile) 72 y.o. male with CKD, hypertension, diabetes mellitus type 1, GREGG, neuroendocrine tumor, pancreatic cancer with splenectomy, partial pancreatitis, and 10% of left kidney removal who presented to  [facility] on 3/20/2023 complaining of increased shortness of breath over the last 3 months in addition to 25 pound weight gain.  -SR     Row Name 03/23/23 1603          Living Environment    People in Home spouse  -SR     Row Name 03/23/23 1603          Cognition    Orientation Status (Cognition) oriented x 4  -SR     Row Name 03/23/23 1603          Safety Issues, Functional Mobility    Impairments Affecting Function (Mobility) balance;endurance/activity tolerance  -SR           User Key  (r) = Recorded By, (t) = Taken By, (c) = Cosigned By    Initials Name Provider Type    SR Madelin Dee, OT Occupational Therapist                 Mobility/ADL's     Row Name 03/23/23 1604          Bed Mobility    Bed Mobility bed mobility (all) activities  -SR     All Activities, [place] (Bed Mobility) modified independence  -SR     Row Name 03/23/23 1604          Sit-Stand Transfer    Sit-Stand [place] (Transfers) modified independence  -SR     Row Name 03/23/23 1604          Functional Mobility    Functional Mobility- Ind. Level supervision required  -SR     Row Name 03/23/23 1604          Activities of Daily Living    BADL Assessment/Intervention lower body dressing  -SR     Row Name 03/23/23 1604          Lower Body Dressing Assessment/Training    Comment, (Lower Body Dressing) Pt requires assist to don socks due to increased fatigue.  Wife reports she is able to assist with this at home until he improves.  -SR           User Key  (r) = Recorded By, (t) = Taken By, (c) = Cosigned By    Initials Name Provider Type    SR Madelin Dee OT Occupational Therapist               Obj/Interventions     Row Name 03/23/23 1606          Range of Motion Comprehensive    General Range of Motion no range of motion deficits identified  -SR     Row Name 03/23/23 1606          Strength Comprehensive (MMT)    General Manual Muscle Testing (MMT) Assessment no strength deficits identified  -SR     Row Name  03/23/23 1606          Balance    Static Sitting Balance independent  -SR     Dynamic Sitting Balance independent  -SR     Static Standing Balance supervision  -SR     Dynamic Standing Balance supervision  -SR     Balance Interventions sitting;standing;sit to stand;static;supported;dynamic;minimal challenge  -SR           User Key  (r) = Recorded By, (t) = Taken By, (c) = Cosigned By    Initials Name Provider Type    SR Madelin Dee, OT Occupational Therapist               Goals/Plan    No documentation.                Clinical Impression     Row Name 03/23/23 1607          Pain Assessment    Pretreatment Pain Rating 0/10 - no pain  -SR     Posttreatment Pain Rating 0/10 - no pain  -SR     Row Name 03/23/23 1607          Plan of Care Review    Outcome Evaluation 72 y.o. male with CKD, hypertension, diabetes mellitus type 1, GREGG, neuroendocrine tumor, pancreatic cancer with splenectomy, partial pancreatitis, and 10% of left kidney removal who presented to [facility] on 3/20/2023 complaining of increased shortness of breath over the last 3 months in addition to 25 pound weight gain.  Pt hypoglycemic when presenting to hospital.  He demos ability to complete mobility without assist and reports he has been able to use toilet in room on his own. Requires assist to don socks due to body habitus, though wife able to assist.  Recommend return home with wife at discharge.  No further OT needs.  -SR     Row Name 03/23/23 1607          Therapy Assessment/Plan (OT)    Criteria for Skilled Therapeutic Interventions Met (OT) no problems identified which require skilled intervention  -SR     Therapy Frequency (OT) evaluation only  -SR     Row Name 03/23/23 1607          Positioning and Restraints    Pre-Treatment Position in bed  -SR     Post Treatment Position bed  -SR           User Key  (r) = Recorded By, (t) = Taken By, (c) = Cosigned By    Initials Name Provider Type    SR Madelin Dee, OT  Occupational Therapist               Outcome Measures     Row Name 03/23/23 1605 03/23/23 1215       How much help from another person do you currently need...    Turning from your back to your side while in flat bed without using bedrails? 4  -PONCHO 3  -ES    Moving from lying on back to sitting on the side of a flat bed without bedrails? 3  -PONCHO 3  -ES    Moving to and from a bed to a chair (including a wheelchair)? 3  -PONCHO 3  -ES    Standing up from a chair using your arms (e.g., wheelchair, bedside chair)? 3  -PONCHO 3  -ES    Climbing 3-5 steps with a railing? 3  -PONCHO 3  -ES    To walk in hospital room? 3  -PONCHO 3  -ES    AM-PAC 6 Clicks Score (PT) 19  -PONCHO 18  -ES    Highest level of mobility 6 --> Walked 10 steps or more  -PONCHO 6 --> Walked 10 steps or more  -ES    Row Name 03/23/23 1605          Functional Assessment    Outcome Measure Options AM-PAC 6 Clicks Basic Mobility (PT)  -PONCHO           User Key  (r) = Recorded By, (t) = Taken By, (c) = Cosigned By    Initials Name Provider Type    Sandi Bazzi, PT Physical Therapist    Megan Fletcher, RN Registered Nurse                Occupational Therapy Education     Title: PT OT SLP Therapies (In Progress)     Topic: Occupational Therapy (In Progress)     Point: ADL training (In Progress)     Description:   Instruct learner(s) on proper safety adaptation and remediation techniques during self care or transfers.   Instruct in proper use of assistive devices.              Learning Progress Summary           Patient Acceptance, E,TB, NR by SR at 3/23/2023 1609                   Point: Home exercise program (Not Started)     Description:   Instruct learner(s) on appropriate technique for monitoring, assisting and/or progressing therapeutic exercises/activities.              Learner Progress:  Not documented in this visit.          Point: Precautions (Not Started)     Description:   Instruct learner(s) on prescribed precautions during self-care and functional transfers.               Learner Progress:  Not documented in this visit.          Point: Body mechanics (In Progress)     Description:   Instruct learner(s) on proper positioning and spine alignment during self-care, functional mobility activities and/or exercises.              Learning Progress Summary           Patient Acceptance, E,TB, NR by  at 3/23/2023 1609                               User Key     Initials Effective Dates Name Provider Type Discipline     06/16/21 -  Madelin Dee OT Occupational Therapist OT              OT Recommendation and Plan  Therapy Frequency (OT): evaluation only  Plan of Care Review  Outcome Evaluation: 72 y.o. male with CKD, hypertension, diabetes mellitus type 1, GREGG, neuroendocrine tumor, pancreatic cancer with splenectomy, partial pancreatitis, and 10% of left kidney removal who presented to [facility] on 3/20/2023 complaining of increased shortness of breath over the last 3 months in addition to 25 pound weight gain.  Pt hypoglycemic when presenting to hospital.  He demos ability to complete mobility without assist and reports he has been able to use toilet in room on his own. Requires assist to don socks due to body habitus, though wife able to assist.  Recommend return home with wife at discharge.  No further OT needs.     Time Calculation:    Time Calculation- OT     Row Name 03/23/23 1609             Time Calculation- OT    OT Start Time 1452  -SR      OT Stop Time 1500  -SR      OT Time Calculation (min) 8 min  -SR      Total Timed Code Minutes- OT 0 minute(s)  -SR      OT Received On 03/23/23  -            User Key  (r) = Recorded By, (t) = Taken By, (c) = Cosigned By    Initials Name Provider Type     Madelin Dee OT Occupational Therapist              Therapy Charges for Today     Code Description Service Date Service Provider Modifiers Qty    [id number]  OT EVAL LOW COMPLEXITY 3 3/23/2023 Madelin Dee OT GO 1                Madelin Dee, OT  3/23/2023

## 2023-03-24 LAB
ALBUMIN SERPL-MCNC: 3.7 G/DL (ref 3.5–5.2)
ALBUMIN/GLOB SERPL: 1.5 G/DL
ALP SERPL-CCNC: 130 U/L (ref 39–117)
ALT SERPL W P-5'-P-CCNC: 18 U/L (ref 1–41)
ANION GAP SERPL CALCULATED.3IONS-SCNC: 11 MMOL/L (ref 5–15)
AST SERPL-CCNC: 35 U/L (ref 1–40)
BASOPHILS # BLD MANUAL: 0.22 10*3/MM3 (ref 0–0.2)
BASOPHILS NFR BLD MANUAL: 3 % (ref 0–1.5)
BILIRUB SERPL-MCNC: 0.3 MG/DL (ref 0–1.2)
BUN SERPL-MCNC: 45 MG/DL (ref 8–23)
BUN/CREAT SERPL: 11.3 (ref 7–25)
CALCIUM SPEC-SCNC: 7.6 MG/DL (ref 8.6–10.5)
CHLORIDE SERPL-SCNC: 100 MMOL/L (ref 98–107)
CO2 SERPL-SCNC: 24 MMOL/L (ref 22–29)
CREAT SERPL-MCNC: 3.98 MG/DL (ref 0.76–1.27)
DEPRECATED RDW RBC AUTO: 49 FL (ref 37–54)
EGFRCR SERPLBLD CKD-EPI 2021: 15.3 ML/MIN/1.73
EOSINOPHIL # BLD MANUAL: 0.52 10*3/MM3 (ref 0–0.4)
EOSINOPHIL NFR BLD MANUAL: 7 % (ref 0.3–6.2)
ERYTHROCYTE [DISTWIDTH] IN BLOOD BY AUTOMATED COUNT: 14.4 % (ref 12.3–15.4)
GIANT PLATELETS: ABNORMAL
GLOBULIN UR ELPH-MCNC: 2.4 GM/DL
GLUCOSE BLDC GLUCOMTR-MCNC: 173 MG/DL (ref 70–105)
GLUCOSE BLDC GLUCOMTR-MCNC: 186 MG/DL (ref 70–105)
GLUCOSE BLDC GLUCOMTR-MCNC: 196 MG/DL (ref 70–105)
GLUCOSE BLDC GLUCOMTR-MCNC: 265 MG/DL (ref 70–105)
GLUCOSE BLDC GLUCOMTR-MCNC: 270 MG/DL (ref 70–105)
GLUCOSE SERPL-MCNC: 230 MG/DL (ref 65–99)
HCT VFR BLD AUTO: 25.8 % (ref 37.5–51)
HGB BLD-MCNC: 8.1 G/DL (ref 13–17.7)
LYMPHOCYTES # BLD MANUAL: 1.04 10*3/MM3 (ref 0.7–3.1)
LYMPHOCYTES NFR BLD MANUAL: 15 % (ref 5–12)
MAGNESIUM SERPL-MCNC: 2.3 MG/DL (ref 1.6–2.4)
MCH RBC QN AUTO: 30 PG (ref 26.6–33)
MCHC RBC AUTO-ENTMCNC: 31.3 G/DL (ref 31.5–35.7)
MCV RBC AUTO: 95.6 FL (ref 79–97)
MONOCYTES # BLD: 1.11 10*3/MM3 (ref 0.1–0.9)
NEUTROPHILS # BLD AUTO: 4.51 10*3/MM3 (ref 1.7–7)
NEUTROPHILS NFR BLD MANUAL: 59 % (ref 42.7–76)
NEUTS BAND NFR BLD MANUAL: 2 % (ref 0–5)
NRBC SPEC MANUAL: 2 /100 WBC (ref 0–0.2)
PHOSPHATE SERPL-MCNC: 4.3 MG/DL (ref 2.5–4.5)
PLATELET # BLD AUTO: 120 10*3/MM3 (ref 140–450)
PMV BLD AUTO: 11 FL (ref 6–12)
POTASSIUM SERPL-SCNC: 4.9 MMOL/L (ref 3.5–5.2)
PROT SERPL-MCNC: 6.1 G/DL (ref 6–8.5)
RBC # BLD AUTO: 2.7 10*6/MM3 (ref 4.14–5.8)
RBC MORPH BLD: NORMAL
SCAN SLIDE: NORMAL
SODIUM SERPL-SCNC: 135 MMOL/L (ref 136–145)
TOXIC GRANULATION: ABNORMAL
VARIANT LYMPHS NFR BLD MANUAL: 14 % (ref 19.6–45.3)
WBC NRBC COR # BLD: 7.4 10*3/MM3 (ref 3.4–10.8)

## 2023-03-24 PROCEDURE — 94660 CPAP INITIATION&MGMT: CPT

## 2023-03-24 PROCEDURE — 25010000002 HYDRALAZINE PER 20 MG: Performed by: INTERNAL MEDICINE

## 2023-03-24 PROCEDURE — 25010000002 HEPARIN (PORCINE) PER 1000 UNITS: Performed by: NURSE PRACTITIONER

## 2023-03-24 PROCEDURE — 83735 ASSAY OF MAGNESIUM: CPT | Performed by: INTERNAL MEDICINE

## 2023-03-24 PROCEDURE — 99232 SBSQ HOSP IP/OBS MODERATE 35: CPT | Performed by: INTERNAL MEDICINE

## 2023-03-24 PROCEDURE — 63710000001 INSULIN LISPRO (HUMAN) PER 5 UNITS: Performed by: INTERNAL MEDICINE

## 2023-03-24 PROCEDURE — 25010000002 EPOETIN ALFA-EPBX 10000 UNIT/ML SOLUTION: Performed by: INTERNAL MEDICINE

## 2023-03-24 PROCEDURE — 84100 ASSAY OF PHOSPHORUS: CPT | Performed by: INTERNAL MEDICINE

## 2023-03-24 PROCEDURE — 82962 GLUCOSE BLOOD TEST: CPT

## 2023-03-24 PROCEDURE — 94799 UNLISTED PULMONARY SVC/PX: CPT

## 2023-03-24 PROCEDURE — 25010000002 HEPARIN (PORCINE) PER 1000 UNITS: Performed by: INTERNAL MEDICINE

## 2023-03-24 PROCEDURE — 80053 COMPREHEN METABOLIC PANEL: CPT | Performed by: INTERNAL MEDICINE

## 2023-03-24 PROCEDURE — 85025 COMPLETE CBC W/AUTO DIFF WBC: CPT | Performed by: INTERNAL MEDICINE

## 2023-03-24 PROCEDURE — 85007 BL SMEAR W/DIFF WBC COUNT: CPT | Performed by: INTERNAL MEDICINE

## 2023-03-24 RX ORDER — INSULIN LISPRO 100 [IU]/ML
2-7 INJECTION, SOLUTION INTRAVENOUS; SUBCUTANEOUS
Status: DISCONTINUED | OUTPATIENT
Start: 2023-03-24 | End: 2023-03-25 | Stop reason: HOSPADM

## 2023-03-24 RX ORDER — NICOTINE POLACRILEX 4 MG
15 LOZENGE BUCCAL
Status: DISCONTINUED | OUTPATIENT
Start: 2023-03-24 | End: 2023-03-24 | Stop reason: SDUPTHER

## 2023-03-24 RX ORDER — OLANZAPINE 10 MG/2ML
1 INJECTION, POWDER, LYOPHILIZED, FOR SOLUTION INTRAMUSCULAR
Status: DISCONTINUED | OUTPATIENT
Start: 2023-03-24 | End: 2023-03-24 | Stop reason: SDUPTHER

## 2023-03-24 RX ORDER — DEXTROSE MONOHYDRATE 25 G/50ML
25 INJECTION, SOLUTION INTRAVENOUS
Status: DISCONTINUED | OUTPATIENT
Start: 2023-03-24 | End: 2023-03-24 | Stop reason: SDUPTHER

## 2023-03-24 RX ADMIN — METOCLOPRAMIDE 10 MG: 10 TABLET ORAL at 20:02

## 2023-03-24 RX ADMIN — HEPARIN SODIUM 5000 UNITS: 1000 INJECTION INTRAVENOUS; SUBCUTANEOUS at 10:30

## 2023-03-24 RX ADMIN — HEPARIN SODIUM 5000 UNITS: 5000 INJECTION INTRAVENOUS; SUBCUTANEOUS at 06:21

## 2023-03-24 RX ADMIN — Medication 10 ML: at 10:53

## 2023-03-24 RX ADMIN — HEPARIN SODIUM 2000 UNITS: 1000 INJECTION INTRAVENOUS; SUBCUTANEOUS at 07:20

## 2023-03-24 RX ADMIN — LATANOPROST 1 DROP: 50 SOLUTION/ DROPS OPHTHALMIC at 20:03

## 2023-03-24 RX ADMIN — CETIRIZINE HYDROCHLORIDE 10 MG: 10 TABLET, FILM COATED ORAL at 10:53

## 2023-03-24 RX ADMIN — METOPROLOL TARTRATE 25 MG: 25 TABLET, FILM COATED ORAL at 20:02

## 2023-03-24 RX ADMIN — Medication 10 ML: at 20:03

## 2023-03-24 RX ADMIN — EPOETIN ALFA-EPBX 15000 UNITS: 10000 INJECTION, SOLUTION INTRAVENOUS; SUBCUTANEOUS at 12:55

## 2023-03-24 RX ADMIN — METOPROLOL TARTRATE 25 MG: 25 TABLET, FILM COATED ORAL at 10:53

## 2023-03-24 RX ADMIN — INSULIN LISPRO 2 UNITS: 100 INJECTION, SOLUTION INTRAVENOUS; SUBCUTANEOUS at 13:04

## 2023-03-24 RX ADMIN — TERAZOSIN HYDROCHLORIDE 1 MG: 1 CAPSULE ORAL at 10:53

## 2023-03-24 RX ADMIN — HEPARIN SODIUM 5000 UNITS: 5000 INJECTION INTRAVENOUS; SUBCUTANEOUS at 13:05

## 2023-03-24 RX ADMIN — HEPARIN SODIUM 5000 UNITS: 5000 INJECTION INTRAVENOUS; SUBCUTANEOUS at 20:13

## 2023-03-24 RX ADMIN — CALCITRIOL CAPSULES 0.25 MCG 0.5 MCG: 0.25 CAPSULE ORAL at 10:53

## 2023-03-24 RX ADMIN — HYDRALAZINE HYDROCHLORIDE 10 MG: 20 INJECTION INTRAMUSCULAR; INTRAVENOUS at 16:59

## 2023-03-24 RX ADMIN — TERAZOSIN HYDROCHLORIDE 1 MG: 1 CAPSULE ORAL at 20:02

## 2023-03-24 RX ADMIN — INSULIN LISPRO 4 UNITS: 100 INJECTION, SOLUTION INTRAVENOUS; SUBCUTANEOUS at 17:00

## 2023-03-24 RX ADMIN — METOCLOPRAMIDE 10 MG: 10 TABLET ORAL at 10:53

## 2023-03-24 NOTE — PROGRESS NOTES
Daily Progress Note    Patient Care Team:  Mary Le MD as PCP - General (Family Medicine)  Fernando Burch MD as Consulting Physician (Hematology and Oncology)    Chief Complaint: Follow-up type 2 diabetes    HPI: Patient seen today.  Eating well.  Blood sugars are on the high side.  Not on any antidiabetic agents.    ROS:   Constitutional:  Denies fatigue, tiredness.    Respiratory: denies cough, shortness of breath.   Cardiovascular:  denies chest pain, edema   GI:  Denies abdominal pain, nausea, vomiting.         Vitals:    03/24/23 1242   BP: 161/63   Pulse: 71   Resp: 10   Temp: 97.5 °F (36.4 °C)   SpO2: 90%     Body mass index is 40.07 kg/m².    Physical Exam:  GEN: NAD, conversant  PSYCH: Awake and coherent      Results Review:     I reviewed the patient's new clinical results.    Glucose   Date Value Ref Range Status   03/24/2023 230 (H) 65 - 99 mg/dL Final     Sodium   Date Value Ref Range Status   03/24/2023 135 (L) 136 - 145 mmol/L Final     Potassium   Date Value Ref Range Status   03/24/2023 4.9 3.5 - 5.2 mmol/L Final     CO2   Date Value Ref Range Status   03/24/2023 24.0 22.0 - 29.0 mmol/L Final     Chloride   Date Value Ref Range Status   03/24/2023 100 98 - 107 mmol/L Final     Anion Gap   Date Value Ref Range Status   03/24/2023 11.0 5.0 - 15.0 mmol/L Final     Creatinine   Date Value Ref Range Status   03/24/2023 3.98 (H) 0.76 - 1.27 mg/dL Final     BUN   Date Value Ref Range Status   03/24/2023 45 (H) 8 - 23 mg/dL Final     BUN/Creatinine Ratio   Date Value Ref Range Status   03/24/2023 11.3 7.0 - 25.0 Final     Calcium   Date Value Ref Range Status   03/24/2023 7.6 (L) 8.6 - 10.5 mg/dL Final     Alkaline Phosphatase   Date Value Ref Range Status   03/24/2023 130 (H) 39 - 117 U/L Final     Total Protein   Date Value Ref Range Status   03/24/2023 6.1 6.0 - 8.5 g/dL Final     ALT (SGPT)   Date Value Ref Range Status   03/24/2023 18 1 - 41 U/L Final     AST (SGOT)   Date Value Ref  Range Status   03/24/2023 35 1 - 40 U/L Final     Total Bilirubin   Date Value Ref Range Status   03/24/2023 0.3 0.0 - 1.2 mg/dL Final     Albumin   Date Value Ref Range Status   03/24/2023 3.7 3.5 - 5.2 g/dL Final     Globulin   Date Value Ref Range Status   03/24/2023 2.4 gm/dL Final     Magnesium   Date Value Ref Range Status   03/24/2023 2.3 1.6 - 2.4 mg/dL Final     Phosphorus   Date Value Ref Range Status   03/24/2023 4.3 2.5 - 4.5 mg/dL Final     Lab Results   Component Value Date    HGBA1C 6.40 (H) 03/22/2023    HGBA1C 6.90 (H) 03/21/2023    HGBA1C 6.8 (H) 09/14/2020     No results found for: GLUF, MICROALBUR  Results from last 7 days   Lab Units 03/24/23  1044 03/24/23  0558 03/24/23  0251 03/23/23  2209 03/23/23  1948 03/23/23  1656   GLUCOSE mg/dL 173* 186* 196* 235* 249* 172*             Medication Review: Reviewed.     calcitriol, 0.5 mcg, Oral, Once per day on Mon Wed Fri  cetirizine, 10 mg, Oral, Daily  epoetin janes/janes-epbx, 15,000 Units, Subcutaneous, Once per day on Mon Wed Fri  heparin (porcine), 5,000 Units, Subcutaneous, Q8H  latanoprost, 1 drop, Both Eyes, Nightly  metoclopramide, 10 mg, Oral, BID  metoprolol tartrate, 25 mg, Oral, Q12H  sodium chloride, 10 mL, Intravenous, Q12H  terazosin, 1 mg, Oral, Q12H              Assessment and plan:  Diabetes mellitus type 2 with hyperglycemia: Uncontrolled, will add Lantus 10 units subcu daily in the morning along with a very mild Humalog sliding scale with meals and follow blood sugars and make adjustments as needed.    Acute kidney injury superimposed on CKD: Followed by nephrology.    Augusta Palmer MD. FACE

## 2023-03-24 NOTE — PROGRESS NOTES
[facility] Medicine Services Daily Progress Note    Patient Name: Gerry Torres  : 1950  MRN: [id number]  Primary Care Physician:  Mary Le MD  Date of admission: 3/20/2023      Subjective      Chief Complaint: Shortness of breath, abnormal labs     Patient Reports he feels okay.  Wants to go home.  Still adjusting insulin regimen.  Started on Lantus 10 units.  Monitor closely as patient is very sensitive     Subjective:  Symptoms:  No shortness of breath or chest pain.    Diet:  No nausea.    Activity level: Normal.    Pain:  He reports no pain.         Objective      Vitals:   Temp:  [97.5 °F (36.4 °C)-98.9 °F (37.2 °C)] 97.5 °F (36.4 °C)  Heart Rate:  [66-82] 71  Resp:  [10-18] 10  BP: (151-183)/(63-83) 161/63  Flow (L/min):  [2] 2    Physical Exam  Vitals reviewed.   Constitutional:       Comments: Wife at bedside   HENT:      Head: Normocephalic.   Cardiovascular:      Rate and Rhythm: Normal rate.   Pulmonary:      Effort: Pulmonary effort is normal.   Abdominal:      General: Abdomen is flat.      Palpations: Abdomen is soft.   Musculoskeletal:         General: Normal range of motion.      Cervical back: Normal range of motion.      Right lower leg: Edema present.      Left lower leg: Edema present.   Skin:     General: Skin is warm.   Neurological:      General: No focal deficit present.      Mental Status: He is alert and oriented to person, place, and time.   Psychiatric:         Mood and Affect: Mood normal.         Behavior: Behavior normal.             Result Review    Result Review:  I have personally reviewed the results from the time of this admission to 3/24/2023 16:38 EDT and agree with these findings:  [x]  Laboratory  []  Microbiology  []  Radiology  []  EKG/Telemetry   []  Cardiology/Vascular   []  Pathology  []  Old records  []  Other:  Most notable findings include: Sodium 135 hemoglobin 8.1        Brief Patient Summary:  Gerry Torres is a 72 y.o.  male who was admitted with abnormal laboratory studies indicating new ESRD and initiation of hemodialysis after tunnel cath placement.  Has been tolerating dialysis without issue, unfortunately has had acute hypoglycemic episodes with recurrence last night.  Dr. Palmer is following.        calcitriol, 0.5 mcg, Oral, Once per day on Mon Wed Fri  cetirizine, 10 mg, Oral, Daily  [START ON 3/24/2023] epoetin janes/janes-epbx, 15,000 Units, Subcutaneous, Once per day on Mon Wed Fri  heparin (porcine), 5,000 Units, Subcutaneous, Q8H  latanoprost, 1 drop, Both Eyes, Nightly  metoclopramide, 10 mg, Oral, BID  sodium chloride, 10 mL, Intravenous, Q12H               Active Hospital Problems:       Active Hospital Problems     Diagnosis     • **Acute kidney injury superimposed on CKD (HCC)        Plan:   Progression to ESRD with azotemia, hyperkalemia  -Renal following  -Tunnel cath placement 3/20/2023 for HD initiation, plan for fistula later this month  -follow K+, improved s/p HD  -Initiate PT/OT for discharge assessment     Shortness of breath, elevated BNP/elevated troponin, lower extremity edema  -Elevated troponin likely due to new onset ESRD  -Normal LV function and RV function on echocardiogram  -EKG without acute ST changes  -Stress test from [facility] 1/2023 negative for ischemia     Hypotension, history HTN  -Blood pressure trending up  -Resume lower dose home antihypertensive     Back pain, CVA tenderness  -UA culture negative  -Back pain resolving     Normocytic anemia of CKD  -epogen per renal     Thrombocytopenia  -Probably reactive, continue to trend     DM 1, hypoglycemic overnight   -d/c home insulin therapy  -on Glucommander protocol for insulin coverage  -consulted Dr. Palmer  -10 units of insulin started March 24 monitor closely.  Hopefully home Saturday if stable     Pancreatic cancer, renal mass s/p whipple procedure/splenectomy partial nephrectomy 2022     GREGG  -Home CPAP  - O2 hs while in  hospital     Hypomagnesium  -Recheck serum level after dialysis     DVT prophylaxis:  Medical and mechanical DVT prophylaxis orders are present.     CODE STATUS:    Code Status (Patient has no pulse and is not breathing): CPR (Attempt to Resuscitate)  Medical Interventions (Patient has pulse or is breathing): Full Support        Disposition:  I expect patient to be discharged tomorrow.     Plan of care discussed with patient     This patient has been examined wearing appropriate Personal Protective Equipment  03/24/23      Electronically signed by Gabriel Casanova MD, 03/24/23, 16:38 EDT.  [facility] Hospitalist Team

## 2023-03-24 NOTE — PROGRESS NOTES
: The patient feels better okay from renal point to discharge home    Vital Signs  Vitals:    03/24/23 1033   BP: (!) 183/83   Pulse: 80   Resp: 18   Temp: 98.6 °F (37 °C)   SpO2:      I/O this shift:  In: 800 [I.V.:800]  Out: 3600 [Other:3600]  I/O last 3 completed shifts:  In: -   Out: 3000 [Other:3000]      Physical Exam:    General: In no acute distress  HEENT:  Normocephalic, Atraumatic, EOMI, PERRLA, NO icterus,  Neck:  Supple, No JVD, No Carotid artery bruit, No lymphadenopathy  Chest: Clear to auscultation bilaterally  Cardiovascular: Regular rate and rhythm. Positive S1 & S2, No rub, murmur or gallop.  Abdominal: Soft, nontender, no palpable organomegaly, no abdominal bruit, positive bowel sounds  Musculoskeletal: No joint tenderness or swelling, good range of motion, Adequate muscle strength on both sides, no cyanosis, clubbing or edema on lower extremities.  Neuro: Alert oriented x3, CN1 - 12 intact, No focal sensory or motor deficit.      Review of Systems:   CNS: Denied headaches, blurred vision, tingling or numbness in any part of body. No weakness or any impaired speech.  CVS: No chest pain or shortness of breath, No orthopnea or PND or exertional dyspnea,  Pulmonary: Denies shortness of breath, coughs, hematemesis, night sweats or weight loss.  GI: Denies diarrhea, nausea, vomiting. Denies weight loss, hematemesis, melena.  : Denies dysuria, frequency or hesitancy of urination  Vascular: Denies claudication, resting pain, tingling numbness or weakness in any part of the body.  Musculoskeletal: Denies Joint tenderness or pain, denies stiffness in joints, denies fever or weight loss, or skin rashes.    Current Labs  [unfilled]  Lab Results (last 24 hours)     Procedure Component Value Units Date/Time    POC Glucose Once [[id number]]  (Abnormal) Collected: 03/24/23 1044    Specimen: Blood Updated: 03/24/23 1045     Glucose 173 mg/dL      Comment: Serial Number: [id number]:  [id number]        Manual Differential [[id number]]  (Abnormal) Collected: 03/24/23 0750    Specimen: Blood Updated: 03/24/23 0851     Neutrophil % 59.0 %      Lymphocyte % 14.0 %      Monocyte % 15.0 %      Eosinophil % 7.0 %      Basophil % 3.0 %      Bands %  2.0 %      Neutrophils Absolute 4.51 10*3/mm3      Lymphocytes Absolute 1.04 10*3/mm3      Monocytes Absolute 1.11 10*3/mm3      Eosinophils Absolute 0.52 10*3/mm3      Basophils Absolute 0.22 10*3/mm3      nRBC 2.0 /100 WBC      RBC Morphology Normal     Toxic Granulation Slight/1+     Giant Platelets Slight/1+    CBC & Differential [[id number]]  (Abnormal) Collected: 03/24/23 0750    Specimen: Blood Updated: 03/24/23 0851    Narrative:      The following orders were created for panel order CBC & Differential.  Procedure                               Abnormality         Status                     ---------                               -----------         ------                     CBC Auto Differential[[id number]]        Abnormal            Final result               Scan Slide[[id number]]                                       Final result                 Please view results for these tests on the individual orders.    Scan Slide [[id number]] Collected: 03/24/23 0750    Specimen: Blood Updated: 03/24/23 0851     Scan Slide --     Comment: See Manual Differential Results       CBC Auto Differential [[id number]]  (Abnormal) Collected: 03/24/23 0750    Specimen: Blood Updated: 03/24/23 0851     WBC 7.40 10*3/mm3      RBC 2.70 10*6/mm3      Hemoglobin 8.1 g/dL      Hematocrit 25.8 %      MCV 95.6 fL      MCH 30.0 pg      MCHC 31.3 g/dL      RDW 14.4 %      RDW-SD 49.0 fl      MPV 11.0 fL      Platelets 120 10*3/mm3     Narrative:      The previously reported component NRBC is no longer being reported. Previous result was 0.9 /100 WBC (Reference Range: 0.0-0.2 /100 WBC) on 3/24/2023 at 0810 EDT.    Magnesium [[id number]]  (Normal) Collected: 03/24/23 0750    Specimen: Blood Updated:  03/24/23 0836     Magnesium 2.3 mg/dL     Comprehensive Metabolic Panel [[id number]]  (Abnormal) Collected: 03/24/23 0750    Specimen: Blood Updated: 03/24/23 0827     Glucose 230 mg/dL      BUN 45 mg/dL      Creatinine 3.98 mg/dL      Sodium 135 mmol/L      Potassium 4.9 mmol/L      Chloride 100 mmol/L      CO2 24.0 mmol/L      Calcium 7.6 mg/dL      Total Protein 6.1 g/dL      Albumin 3.7 g/dL      ALT (SGPT) 18 U/L      AST (SGOT) 35 U/L      Alkaline Phosphatase 130 U/L      Total Bilirubin 0.3 mg/dL      Globulin 2.4 gm/dL      A/G Ratio 1.5 g/dL      BUN/Creatinine Ratio 11.3     Anion Gap 11.0 mmol/L      eGFR 15.3 mL/min/1.73     Narrative:      GFR Normal >60  Chronic Kidney Disease <60  Kidney Failure <15    The GFR formula is only valid for adults with stable renal function between ages 18 and 70.    Phosphorus [[id number]]  (Normal) Collected: 03/24/23 0750    Specimen: Blood Updated: 03/24/23 0827     Phosphorus 4.3 mg/dL     POC Glucose Once [[id number]]  (Abnormal) Collected: 03/24/23 0558    Specimen: Blood Updated: 03/24/23 0600     Glucose 186 mg/dL      Comment: Serial Number: [id number]:  [id number]       POC Glucose Once [[id number]]  (Abnormal) Collected: 03/24/23 0251    Specimen: Blood Updated: 03/24/23 0253     Glucose 196 mg/dL      Comment: Serial Number: [id number]:  [id number]       POC Glucose Once [[id number]]  (Abnormal) Collected: 03/23/23 2209    Specimen: Blood Updated: 03/23/23 2211     Glucose 235 mg/dL      Comment: Serial Number: [id number]:  [id number]       POC Glucose Once [[id number]]  (Abnormal) Collected: 03/23/23 1948    Specimen: Blood Updated: 03/23/23 1949     Glucose 249 mg/dL      Comment: Serial Number: [id number]:  [id number]       POC Glucose Once [[id number]]  (Abnormal) Collected: 03/23/23 1656    Specimen: Blood Updated: 03/23/23 1657     Glucose 172 mg/dL      Comment: Serial Number: [id number]:  [id number]       Magnesium [[id number]]   (Normal) Collected: 03/23/23 1528    Specimen: Blood Updated: 03/23/23 1603     Magnesium 1.7 mg/dL     POC Glucose Once [[id number]]  (Abnormal) Collected: 03/23/23 1503    Specimen: Blood Updated: 03/23/23 1504     Glucose 157 mg/dL      Comment: Serial Number: [id number]:  [id number]       POC Glucose Once [[id number]]  (Abnormal) Collected: 03/23/23 1307    Specimen: Blood Updated: 03/23/23 1309     Glucose 170 mg/dL      Comment: Serial Number: [id number]:  [id number]       POC Glucose Once [[id number]]  (Abnormal) Collected: 03/23/23 1216    Specimen: Blood Updated: 03/23/23 1217     Glucose 126 mg/dL      Comment: Serial Number: [id number]:  [id number]           Current Radiology  Imaging Results (Last 24 Hours)     ** No results found for the last 24 hours. **        Current Meds    Current Facility-Administered Medications:   •  acetaminophen (TYLENOL) tablet 650 mg, 650 mg, Oral, Q4H PRN **OR** acetaminophen (TYLENOL) 160 MG/5ML solution 650 mg, 650 mg, Oral, Q4H PRN **OR** acetaminophen (TYLENOL) suppository 650 mg, 650 mg, Rectal, Q4H PRN, Keiry Riddle APRN  •  albumin human 25 % IV SOLN 12.5 g, 12.5 g, Intravenous, Q1H PRN, Garrett King MD  •  calcitriol (ROCALTROL) capsule 0.5 mcg, 0.5 mcg, Oral, Once per day on Mon Wed Fri, Keiry Riddle APRN, 0.5 mcg at 03/24/23 1053  •  cetirizine (zyrTEC) tablet 10 mg, 10 mg, Oral, Daily, Keiry Riddle APRN, 10 mg at 03/24/23 1053  •  dextrose (D50W) (25 g/50 mL) IV injection 10-50 mL, 10-50 mL, Intravenous, Q15 Min PRN, Meme Leach DO  •  dextrose (D50W) (25 g/50 mL) IV injection 25 g, 25 g, Intravenous, Q15 Min PRN, Gabriel Casanova MD, 25 g at 03/23/23 0450  •  dextrose (GLUTOSE) oral gel 15 g, 15 g, Oral, Q15 Min PRNEdilberto Viktor, MD  •  epoetin jnaes-epbx (RETACRIT) injection 15,000 Units, 15,000 Units, Subcutaneous, Once per day on Mon Wed Fri, Altagracia Peoples MD  •  glucagon (human recombinant) (GLUCAGEN DIAGNOSTIC) 1 mg in  sterile water (preservative free) 1 mL injection, 1 mg, Intramuscular, Q15 Min PRN, Gabriel Casanova MD  •  heparin (porcine) 5000 UNIT/ML injection 5,000 Units, 5,000 Units, Subcutaneous, Q8H, Keiry Riddle APRN, 5,000 Units at 03/24/23 0621  •  heparin (porcine) injection 2,000 Units, 2,000 Units, Intracatheter, PRN, Garrett King MD, 2,000 Units at 03/24/23 0720  •  heparin (porcine) injection 5,000 Units, 5,000 Units, Intracatheter, PRN, Garrett King MD, 5,000 Units at 03/24/23 1030  •  hydrALAZINE (APRESOLINE) injection 10 mg, 10 mg, Intravenous, Q6H PRN, Gabriel Casanova MD  •  HYDROcodone-acetaminophen (NORCO) 5-325 MG per tablet 1 tablet, 1 tablet, Oral, Q4H PRN, Keiry Riddle APRN, 1 tablet at 03/20/23 2251  •  latanoprost (XALATAN) 0.005 % ophthalmic solution 1 drop, 1 drop, Both Eyes, Nightly, Keiry Riddle APRN, 1 drop at 03/23/23 2214  •  metoclopramide (REGLAN) tablet 10 mg, 10 mg, Oral, BID, Keiry Riddle APRN, 10 mg at 03/24/23 1053  •  metoprolol tartrate (LOPRESSOR) tablet 25 mg, 25 mg, Oral, Q12H, Keiry Riddle APRN, 25 mg at 03/24/23 1053  •  midodrine (PROAMATINE) tablet 5 mg, 5 mg, Oral, TID PRN, Gabriel Casanova MD  •  morphine injection 2 mg, 2 mg, Intravenous, Q4H PRN, Gabriel Casanova MD, 2 mg at 03/21/23 2113  •  naloxone (NARCAN) injection 0.4 mg, 0.4 mg, Intravenous, Q5 Min PRN, Gabriel Casanova MD  •  nitroglycerin (NITROSTAT) SL tablet 0.4 mg, 0.4 mg, Sublingual, Q5 Min PRN, Keiry Riddle APRN  •  ondansetron (ZOFRAN) tablet 4 mg, 4 mg, Oral, Q6H PRN **OR** ondansetron (ZOFRAN) injection 4 mg, 4 mg, Intravenous, Q6H PRN, Keiry Riddle APRN, 4 mg at 03/23/23 2537  •  oxyCODONE (ROXICODONE) immediate release tablet 10 mg, 10 mg, Oral, Q4H PRN, Gabriel Casanova MD, 10 mg at 03/23/23 0739  •  [COMPLETED] Insert Peripheral IV, , , Once **AND** sodium chloride 0.9 % flush 10 mL, 10 mL, Intravenous, PRN, Keiry Riddle APRN  •  sodium chloride 0.9 % flush 10 mL, 10 mL,  Intravenous, Q12H, Janessa, Keiry, APRN, 10 mL at 03/24/23 1053  •  sodium chloride 0.9 % flush 10 mL, 10 mL, Intravenous, PRN, Janessa, Keiry, APRN  •  sodium chloride 0.9 % infusion 40 mL, 40 mL, Intravenous, PRN, Janessa, Keiry, APRN  •  terazosin (HYTRIN) capsule 1 mg, 1 mg, Oral, Q12H, Janessa, Keiry, APRN, 1 mg at 03/24/23 1053              Patient Active Problem List   Diagnosis   • Family history of malignant neoplasm of trachea, bronchus and lung   • Hyperlipidemia   • Hypertension   • Onychomycosis of toenail   • 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  03/24/23  11:45 EDT

## 2023-03-24 NOTE — NURSING NOTE
Pt had a 3hr HD tx with a net fluid removal of 2.8L without complications. He is a&ox3 with no complaints post HD.

## 2023-03-24 NOTE — PLAN OF CARE
Goal Outcome Evaluation:                 Patient had dialysis this morning. Nephrology signed off on 3/24. Endocrinology started patient back on sliding scale and lantus. BG's high, requiring insulin. Patient hypertensive right now, prn hydralazine given, will recheck BP.

## 2023-03-24 NOTE — CASE MANAGEMENT/SOCIAL WORK
Continued Stay Note  ZAYNAB Dunham     Patient Name: Gerry Torres  MRN: [id number]  Today's Date: 3/24/2023    Admit Date: 3/20/2023    Plan: D/C plan: Anticipate home with spouse. New OP HD set up: [facility]. Start date Monday 3/27. Watch for oxygen needs.   Discharge Plan     Row Name 03/24/23 1500       Plan    Plan D/C plan: Anticipate home with spouse. New OP HD set up: [facility]. Start date Monday 3/27. Watch for oxygen needs.    Patient/Family in Agreement with Plan yes    Plan Comments CM spoke with [facility], advised that pt is still in the hospital and hopes to be d/c tomorrow. Needs to be rescheduled to start on Monday. [facility] will make the necessary change.                     Met with patient in room wearing PPE: mask    Maintained distance greater than six feet and spent less than 15 minutes in the room.        Jack Keller RN

## 2023-03-25 ENCOUNTER — READMISSION MANAGEMENT (OUTPATIENT)
Dept: CALL CENTER | Facility: HOSPITAL | Age: 73
End: 2023-03-25
Payer: MEDICARE

## 2023-03-25 VITALS
DIASTOLIC BLOOD PRESSURE: 60 MMHG | RESPIRATION RATE: 16 BRPM | OXYGEN SATURATION: 92 % | HEIGHT: 72 IN | TEMPERATURE: 98.2 F | HEART RATE: 72 BPM | SYSTOLIC BLOOD PRESSURE: 162 MMHG | BODY MASS INDEX: 37.6 KG/M2 | WEIGHT: 277.6 LBS

## 2023-03-25 LAB
GLUCOSE BLDC GLUCOMTR-MCNC: 266 MG/DL (ref 70–105)
GLUCOSE BLDC GLUCOMTR-MCNC: 275 MG/DL (ref 70–105)
GLUCOSE BLDC GLUCOMTR-MCNC: 290 MG/DL (ref 70–105)
GLUCOSE BLDC GLUCOMTR-MCNC: 334 MG/DL (ref 70–105)

## 2023-03-25 PROCEDURE — 63710000001 INSULIN GLARGINE PER 5 UNITS: Performed by: INTERNAL MEDICINE

## 2023-03-25 PROCEDURE — 99232 SBSQ HOSP IP/OBS MODERATE 35: CPT | Performed by: INTERNAL MEDICINE

## 2023-03-25 PROCEDURE — 25010000002 HEPARIN (PORCINE) PER 1000 UNITS: Performed by: NURSE PRACTITIONER

## 2023-03-25 PROCEDURE — 63710000001 INSULIN LISPRO (HUMAN) PER 5 UNITS: Performed by: INTERNAL MEDICINE

## 2023-03-25 PROCEDURE — 82962 GLUCOSE BLOOD TEST: CPT

## 2023-03-25 PROCEDURE — 94799 UNLISTED PULMONARY SVC/PX: CPT

## 2023-03-25 RX ORDER — INSULIN GLARGINE 300 U/ML
20 INJECTION, SOLUTION SUBCUTANEOUS DAILY
Qty: 2 ML | Refills: 0 | Status: SHIPPED | OUTPATIENT
Start: 2023-03-25 | End: 2023-04-24

## 2023-03-25 RX ORDER — INSULIN GLARGINE 300 U/ML
20 INJECTION, SOLUTION SUBCUTANEOUS DAILY
Qty: 6 ML | Refills: 0 | Status: SHIPPED | OUTPATIENT
Start: 2023-03-25 | End: 2023-03-25 | Stop reason: SDUPTHER

## 2023-03-25 RX ORDER — INSULIN GLARGINE 300 U/ML
20 INJECTION, SOLUTION SUBCUTANEOUS DAILY
Qty: 2 ML | Refills: 0 | Status: SHIPPED | OUTPATIENT
Start: 2023-03-25 | End: 2023-03-25 | Stop reason: SDUPTHER

## 2023-03-25 RX ADMIN — HEPARIN SODIUM 5000 UNITS: 5000 INJECTION INTRAVENOUS; SUBCUTANEOUS at 05:35

## 2023-03-25 RX ADMIN — CETIRIZINE HYDROCHLORIDE 10 MG: 10 TABLET, FILM COATED ORAL at 08:30

## 2023-03-25 RX ADMIN — INSULIN GLARGINE 10 UNITS: 100 INJECTION, SOLUTION SUBCUTANEOUS at 08:30

## 2023-03-25 RX ADMIN — INSULIN LISPRO 5 UNITS: 100 INJECTION, SOLUTION INTRAVENOUS; SUBCUTANEOUS at 13:17

## 2023-03-25 RX ADMIN — INSULIN LISPRO 4 UNITS: 100 INJECTION, SOLUTION INTRAVENOUS; SUBCUTANEOUS at 08:30

## 2023-03-25 RX ADMIN — Medication 10 ML: at 08:31

## 2023-03-25 RX ADMIN — TERAZOSIN HYDROCHLORIDE 1 MG: 1 CAPSULE ORAL at 08:29

## 2023-03-25 RX ADMIN — METOCLOPRAMIDE 10 MG: 10 TABLET ORAL at 08:29

## 2023-03-25 RX ADMIN — METOPROLOL TARTRATE 25 MG: 25 TABLET, FILM COATED ORAL at 08:30

## 2023-03-25 NOTE — PROGRESS NOTES
LOS: 5 days     Chief Complaint/ Reason for encounter: ESRD, dialysis management    Subjective   03/25/23 : He is doing very well, tolerating dialysis well so far  He has completed a total of 3 dialysis treatments  Weight is down 20 pounds since admission with UF  He denies any shortness of breath or chest pain, uremic symptoms improved with good appetite no nausea      Medical history reviewed:  History of Present Illness    Subjective    History taken from: Patient and chart    Vital Signs  Temp:  [97.5 °F (36.4 °C)-98.5 °F (36.9 °C)] 98.2 °F (36.8 °C)  Heart Rate:  [67-84] 72  Resp:  [10-16] 16  BP: (161-193)/(54-84) 162/60       Wt Readings from Last 1 Encounters:   03/25/23 0630 126 kg (277 lb 9.6 oz)   03/24/23 1649 126 kg (278 lb 9.6 oz)   03/22/23 0639 134 kg (295 lb 6.7 oz)   03/21/23 2019 135 kg (297 lb)   03/21/23 0640 135 kg (297 lb 14.4 oz)   03/20/23 0933 135 kg (296 lb 8.3 oz)       Objective    Objective:  General Appearance:  Comfortable, obese, chronically ill-appearing, in no acute distress and not in pain.  Awake, alert, oriented  HEENT: Mucous membranes moist, no injury, oropharynx clear  Lungs:  Normal effort and normal respiratory rate.  Breath sounds clear to auscultation.  No  respiratory distress.  No rales, decreased breath sounds or rhonchi.    Heart: Normal rate.  Regular rhythm.  S1, S2 normal.  No murmur.   Abdomen: Abdomen is soft.  Bowel sounds are normal, no abdominal tenderness.  There is no rebound or guarding  Extremities: 2+ edema of bilateral lower extremities  Neurological: No focal motor or sensory deficits, pupils reactive  Skin:  Warm and dry.  No rash or cyanosis.       Results Review:    Intake/Output:     Intake/Output Summary (Last 24 hours) at 3/25/2023 1150  Last data filed at 3/25/2023 0700  Gross per 24 hour   Intake 680 ml   Output 100 ml   Net 580 ml         DATA:  Radiology and Labs:  The following labs independently reviewed by me. Additional labs ordered for  tomorrow a.m.  Interval notes, chart personally reviewed by me.   Old records independently reviewed showing progressive CKD now with uremia  The following radiologic studies independently viewed by me, findings echo showed EF 55% with LVH  New problems include fluid overload  Discussed with patient    Risk/ complexity of medical care/ medical decision making moderate complexity, dialysis management    Labs:   Recent Results (from the past 24 hour(s))   POC Glucose Once    Collection Time: 03/24/23  4:12 PM    Specimen: Blood   Result Value Ref Range    Glucose 265 (H) 70 - 105 mg/dL   POC Glucose Once    Collection Time: 03/24/23  8:01 PM    Specimen: Blood   Result Value Ref Range    Glucose 270 (H) 70 - 105 mg/dL   POC Glucose Once    Collection Time: 03/25/23  1:22 AM    Specimen: Blood   Result Value Ref Range    Glucose 290 (H) 70 - 105 mg/dL   POC Glucose Once    Collection Time: 03/25/23  5:10 AM    Specimen: Blood   Result Value Ref Range    Glucose 266 (H) 70 - 105 mg/dL   POC Glucose Once    Collection Time: 03/25/23  7:59 AM    Specimen: Blood   Result Value Ref Range    Glucose 275 (H) 70 - 105 mg/dL       Radiology:  Pertinent radiology studies were reviewed as described above      Medications have been reviewed separately in chart overview      ASSESSMENT:  CKD stage IV with progression to ESRD, initiated on dialysis  azotemia with uremia, symptoms improved with HD  Edema/volume overload, improved with HD.  Weight down 20 pounds  Metabolic acidosis, improved with dialysis  Hyperkalemia, resolved with dialysis  Anemia of CKD, on Epogen  Proteinuria  Atrophic left kidney  Renal cell carcinoma status post partial left nephrectomy  Pancreatic cancer status post Whipple  Hypertension  Diabetes mellitus type 2  Secondary hyperparathyroidism        DISCUSSION/PLAN:   Continue dialysis Monday Wednesday Friday, symptoms significantly improved with HD initiation and fluid removal  He has an outpatient dialysis  spot in [place] and can start on Monday  Discussed importance of fluid restriction and low potassium diet  Continue Epogen with dialysis.  This will be continue with his outpatient dialysis treatments.  Iron  studies acceptable  Calcitriol 3 times weekly for hyperparathyroidism  Phosphorus has been on goal, not on binders    Has an appointment later this month for AV fistula mapping with vascular  Stable for discharge at any time from a renal standpoint      Garrett King MD   [facility]   Office phone number: [phone]  Answering service phone number: [phone]    03/25/23  11:50 EDT    Dictation performed using Dragon dictation software

## 2023-03-25 NOTE — PROGRESS NOTES
Daily Progress Note    Patient Care Team:  Mary Le MD as PCP - General (Family Medicine)  Fernando Burch MD as Consulting Physician (Hematology and Oncology)    Chief Complaint: Follow-up type 2 diabetes    HPI: Patient seen and examined today.  Clinically doing well.  Blood sugars are now running high.  He is eating well.    ROS:   Constitutional:  Denies fatigue, tiredness.    Respiratory: denies cough, shortness of breath.   Cardiovascular:  denies chest pain, edema   GI:  Denies abdominal pain, nausea, vomiting.         Vitals:    03/25/23 0859   BP: 162/60   Pulse:    Resp:    Temp:    SpO2:      Body mass index is 37.65 kg/m².    Physical Exam:  GEN: NAD, conversant  PSYCH: Awake and coherent.      Results Review:     I reviewed the patient's new clinical results.    Glucose   Date Value Ref Range Status   03/24/2023 230 (H) 65 - 99 mg/dL Final     Sodium   Date Value Ref Range Status   03/24/2023 135 (L) 136 - 145 mmol/L Final     Potassium   Date Value Ref Range Status   03/24/2023 4.9 3.5 - 5.2 mmol/L Final     CO2   Date Value Ref Range Status   03/24/2023 24.0 22.0 - 29.0 mmol/L Final     Chloride   Date Value Ref Range Status   03/24/2023 100 98 - 107 mmol/L Final     Anion Gap   Date Value Ref Range Status   03/24/2023 11.0 5.0 - 15.0 mmol/L Final     Creatinine   Date Value Ref Range Status   03/24/2023 3.98 (H) 0.76 - 1.27 mg/dL Final     BUN   Date Value Ref Range Status   03/24/2023 45 (H) 8 - 23 mg/dL Final     BUN/Creatinine Ratio   Date Value Ref Range Status   03/24/2023 11.3 7.0 - 25.0 Final     Calcium   Date Value Ref Range Status   03/24/2023 7.6 (L) 8.6 - 10.5 mg/dL Final     Alkaline Phosphatase   Date Value Ref Range Status   03/24/2023 130 (H) 39 - 117 U/L Final     Total Protein   Date Value Ref Range Status   03/24/2023 6.1 6.0 - 8.5 g/dL Final     ALT (SGPT)   Date Value Ref Range Status   03/24/2023 18 1 - 41 U/L Final     AST (SGOT)   Date Value Ref Range Status    03/24/2023 35 1 - 40 U/L Final     Total Bilirubin   Date Value Ref Range Status   03/24/2023 0.3 0.0 - 1.2 mg/dL Final     Albumin   Date Value Ref Range Status   03/24/2023 3.7 3.5 - 5.2 g/dL Final     Globulin   Date Value Ref Range Status   03/24/2023 2.4 gm/dL Final     Magnesium   Date Value Ref Range Status   03/24/2023 2.3 1.6 - 2.4 mg/dL Final     Phosphorus   Date Value Ref Range Status   03/24/2023 4.3 2.5 - 4.5 mg/dL Final     Lab Results   Component Value Date    HGBA1C 6.40 (H) 03/22/2023    HGBA1C 6.90 (H) 03/21/2023    HGBA1C 6.8 (H) 09/14/2020     No results found for: GLUF, MICROALBUR  Results from last 7 days   Lab Units 03/25/23  1149 03/25/23  0759 03/25/23  0510 03/25/23  0122 03/24/23 2001 03/24/23  1612   GLUCOSE mg/dL 334* 275* 266* 290* 270* 265*             Medication Review: Reviewed.     calcitriol, 0.5 mcg, Oral, Once per day on Mon Wed Fri  cetirizine, 10 mg, Oral, Daily  epoetin janes/janes-epbx, 15,000 Units, Subcutaneous, Once per day on Mon Wed Fri  heparin (porcine), 5,000 Units, Subcutaneous, Q8H  insulin glargine, 10 Units, Subcutaneous, QAM  insulin lispro, 2-7 Units, Subcutaneous, TID With Meals  latanoprost, 1 drop, Both Eyes, Nightly  metoclopramide, 10 mg, Oral, BID  metoprolol tartrate, 25 mg, Oral, Q12H  sodium chloride, 10 mL, Intravenous, Q12H  terazosin, 1 mg, Oral, Q12H              Assessment and plan:  Diabetes mellitus type 2 with hyperglycemia: Uncontrolled, will increase Lantus to 20 units subcu daily and continue Humalog sliding scale for now.  But if he continues to run high blood sugars he may need a scheduled prandial dose of insulin.  He is being discharged and follows with Dr. Alvarez as an outpatient.  Advised to call Dr. Alvarez for further evaluation.    Acute kidney injury superimposed on CKD: Followed by nephrology.    Okay to discharge from endocrine perspective.    Augusta Palmer MD. FACE

## 2023-03-25 NOTE — PLAN OF CARE
Problem: Adult Inpatient Plan of Care  Goal: Plan of Care Review  Outcome: Ongoing, Progressing  Flowsheets (Taken 3/25/2023 0343)  Progress: no change  Plan of Care Reviewed With: patient  Goal: Patient-Specific Goal (Individualized)  Outcome: Ongoing, Progressing  Goal: Absence of Hospital-Acquired Illness or Injury  Outcome: Ongoing, Progressing  Intervention: Identify and Manage Fall Risk  Recent Flowsheet Documentation  Taken 3/25/2023 0000 by Christiano Melendez RN  Safety Promotion/Fall Prevention: safety round/check completed  Taken 3/24/2023 2200 by Christiano Melendez, RN  Safety Promotion/Fall Prevention: safety round/check completed  Taken 3/24/2023 2000 by Christiano Melendez RN  Safety Promotion/Fall Prevention: safety round/check completed  Goal: Optimal Comfort and Wellbeing  Outcome: Ongoing, Progressing  Intervention: Provide Person-Centered Care  Recent Flowsheet Documentation  Taken 3/24/2023 2000 by Christiano Melendez, RN  Trust Relationship/Rapport:   care explained   choices provided  Goal: Readiness for Transition of Care  Outcome: Ongoing, Progressing     Problem: Pain Acute  Goal: Acceptable Pain Control and Functional Ability  Outcome: Ongoing, Progressing     Problem: Diabetes Comorbidity  Goal: Blood Glucose Level Within Targeted Range  Outcome: Ongoing, Progressing     Problem: Hypertension Comorbidity  Goal: Blood Pressure in Desired Range  Outcome: Ongoing, Progressing     Problem: Skin Injury Risk Increased  Goal: Skin Health and Integrity  Outcome: Ongoing, Progressing   Goal Outcome Evaluation:  Plan of Care Reviewed With: patient        Progress: no change

## 2023-03-25 NOTE — DISCHARGE SUMMARY
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         Hospital Course     Hospital Course:  Gerry Torres is a 72 y.o. male who presented to the hospital with severe fluid overload.  Nephrology was consulted and the patient was initiated on hemodialysis for the first time.  Hemodialysis catheter was placed.  Patient developed severe hypoglycemia and endocrinology had to be consulted.  His insulin was adjusted.  Patient has a Dexcom and monitors his glucose closely.  The patient has his own endocrinologist Dr. Alvarez and was instructed to follow-up with them after his insulin was adjusted downwards.  Once cleared by nephrology and endocrinology the patient was sent home in stable condition with stable vitals with instructions to continue dialysis beginning on Monday.  He was instructed not to skip any meals and keep an eye on his sugar very closely.          Reasons For Change In Medications and Indications for New Medications:      Day of Discharge     Vital Signs:  Temp:  [97.5 °F (36.4 °C)-98.5 °F (36.9 °C)] 98.2 °F (36.8 °C)  Heart Rate:  [67-84] 72  Resp:  [13-16] 16  BP: (162-193)/(54-84) 162/60    Physical Exam:  Physical Exam  Vitals reviewed.   HENT:       Head: Normocephalic.   Cardiovascular:      Rate and Rhythm: Normal rate.   Pulmonary:      Effort: Pulmonary effort is normal.   Abdominal:      General: Abdomen is flat.      Palpations: Abdomen is soft.   Musculoskeletal:         General: Normal range of motion.      Cervical back: Normal range of motion.   Skin:     General: Skin is warm.   Neurological:      General: No focal deficit present.      Mental Status: He is alert and oriented to person, place, and time.   Psychiatric:         Mood and Affect: Mood normal.         Behavior: Behavior normal.            Pertinent  and/or Most Recent Results     LAB RESULTS:      Lab 03/24/23  0750 03/23/23  0004 03/22/23  0909 03/21/23  0504 03/20/23  1005   WBC 7.40 10.60 9.80 8.70 9.80   HEMOGLOBIN 8.1* 7.9* 8.0* 8.0* 8.8*   HEMATOCRIT 25.8* 24.2* 25.4* 25.6* 28.2*   PLATELETS 120* 127* 126* 131* 164   NEUTROS ABS 4.51 6.80 7.00 6.40 7.30*   LYMPHS ABS  --  1.50 1.10 0.90 1.20   MONOS ABS  --  1.80* 1.50* 1.10* 1.10*   EOS ABS 0.52* 0.30 0.20 0.10 0.20   MCV 95.6 92.3 93.3 94.1 94.3   PROTIME  --   --   --   --  10.9   APTT  --   --   --   --  29.0         Lab 03/24/23  0750 03/23/23  1528 03/23/23  0004 03/22/23  0909 03/22/23  0430 03/21/23  0504 03/20/23  1005   SODIUM 135*  --  138  --  135* 140 140   POTASSIUM 4.9  --  4.8  --  4.6 5.0 5.7*   CHLORIDE 100  --  101  --  99 105 107   CO2 24.0  --  24.0  --  21.0* 17.0* 14.0*   ANION GAP 11.0  --  13.0  --  15.0 18.0* 19.0*   BUN 45*  --  67*  --  63* 90* 110*   CREATININE 3.98*  --  5.27*  --  5.01* 6.41* 6.89*   EGFR 15.3*  --  11.0*  --  11.6* 8.7* 7.9*   GLUCOSE 230*  --  113*  --  68 136* 167*   CALCIUM 7.6*  --  7.8*  --  7.8* 7.7* 7.8*   MAGNESIUM 2.3 1.7 1.5*  --  1.6 1.7  --    PHOSPHORUS 4.3  --  5.6*  --  6.6* 9.5*  --    HEMOGLOBIN A1C  --   --   --  6.40*  --  6.90*  --          Lab 03/24/23  0750 03/23/23  0004 03/22/23  0430 03/21/23  0504 03/20/23  1005   TOTAL PROTEIN 6.1 5.8* 6.3 5.8* 6.1    ALBUMIN 3.7 3.6 3.8 3.6 3.9   GLOBULIN 2.4 2.2 2.5 2.2 2.2   ALT (SGPT) 18 8 12 12 12   AST (SGOT) 35 20 21 16 10   BILIRUBIN 0.3 0.2 0.3 0.3 0.3   ALK PHOS 130* 97 100 95 100   LIPASE  --   --   --   --  7*         Lab 03/21/23  1202 03/21/23  0504 03/20/23  1005   PROBNP  --   --  3,192.0*   HSTROP T 96* 85*  --    PROTIME  --   --  10.9   INR  --   --  1.06             Lab 03/21/23  0504   IRON 75   IRON SATURATION 29   TIBC 258*   TRANSFERRIN 173*   FERRITIN 188.00         Brief Urine Lab Results  (Last result in the past 365 days)      Color   Clarity   Blood   Leuk Est   Nitrite   Protein   CREAT   Urine HCG        03/20/23 1233 Yellow   Slightly Cloudy  Comment: Result checked     Negative   Moderate (2+)   Negative   >=300 mg/dL (3+)               Microbiology Results (last 10 days)     Procedure Component Value - Date/Time    Urine Culture - Urine, Urine, Clean Catch [[id number]]  (Normal) Collected: 03/20/23 1233    Lab Status: Final result Specimen: Urine, Clean Catch Updated: 03/21/23 1306     Urine Culture No growth          XR Chest 1 View    Result Date: 3/20/2023  Impression: Impression: 1. Borderline heart size with enlarged main pulmonary artery segments suggesting changes of pulmonary arterial hypertension. 2. No focal airspace consolidation. Electronically Signed: Grover Mccabe  3/20/2023 11:46 AM EDT  Workstation ID: [id number]    IR Insert Tunneled CV Catheter Without Port 5 Plus    Result Date: 3/20/2023  Impression: Impression: Technically successful placement, 15.5 Finnish dual lumen tunneled hemodialysis catheter, utilizing the right internal jugular vein approach, as well as both sonographic and fluoroscopic control. Electronically Signed: Annmarie Madden  3/20/2023 3:35 PM EDT  Workstation ID: [id number]              Results for orders placed during the hospital encounter of 03/20/23    Adult Transthoracic Echo Complete w/ Color, Spectral and Contrast if Necessary Per Protocol    Interpretation  Summary  •  Left ventricular systolic function is normal. Calculated left ventricular EF = 57%  •  Left ventricular wall thickness is consistent with mild concentric hypertrophy.  •  The right atrial cavity is mild to moderately  dilated.  •  There is calcification of the aortic valve.  •  Abnormal mitral valve structure consistent with dilated annulus.  •  There is a trivial pericardial effusion.    Transthoracic echocardiography reveals EF of 57%.  Trivial to small pericardial effusion noted.  Mild MR and mild TR noted.  Moderate left atrial enlargement.    Electronically signed by Tramaine Pérez MD, 03/22/23, 12:08 PM EDT.      Labs Pending at Discharge:      Procedures Performed           Consults:   Consults     Date and Time Order Name Status Description    3/22/2023  4:22 AM Inpatient Endocrinology Consult      3/20/2023 12:08 PM Nephrology (on -call MD unless specified) Completed     3/20/2023 12:08 PM Hospitalist (on-call MD unless specified)              Discharge Details        Discharge Medications      Changes to Medications      Instructions Start Date   Toujeo Max SoloStar 300 UNIT/ML solution pen-injector injection  Generic drug: Insulin Glargine (2 Unit Dial)  What changed: how much to take   20 Units, Subcutaneous, Daily         Continue These Medications      Instructions Start Date   amLODIPine 10 MG tablet  Commonly known as: NORVASC   10 mg, Oral, Daily      calcitriol 0.5 MCG capsule  Commonly known as: ROCALTROL   0.5 mcg, Oral, 3 Times Weekly      cyanocobalamin 1000 MCG/ML injection   1,000 mcg, Intramuscular, Every 30 Days      doxazosin 8 MG tablet  Commonly known as: CARDURA   8 mg, Oral, 2 Times Daily      hydrALAZINE 100 MG tablet  Commonly known as: APRESOLINE   100 mg, Oral, 3 Times Daily      ibandronate 150 MG tablet  Commonly known as: BONIVA   150 mg, Oral, Every 30 Days      levocetirizine 5 MG tablet  Commonly known as: XYZAL   5 mg, Oral, Every Evening      Lumigan 0.01 %  ophthalmic drops  Generic drug: bimatoprost   1 drop, Both Eyes, Nightly, Instill 1 drop into both eyes daily at bedtime      Manjinder Multivitamin for Men tablet tablet  Generic drug: multivitamin with minerals   1 tablet, Oral, Daily, Take one daily      metoclopramide 10 MG tablet  Commonly known as: REGLAN   10 mg, Oral, 2 Times Daily, Morning and lunch      metoprolol tartrate 25 MG tablet  Commonly known as: LOPRESSOR   25 mg, Oral, 2 Times Daily      tamsulosin 0.4 MG capsule 24 hr capsule  Commonly known as: FLOMAX   2 capsules, Oral, Every Night at Bedtime      vitamin C 500 MG tablet  Commonly known as: ASCORBIC ACID   500 mg, Oral, Daily      Vitamin D3 50 MCG (2000 UT) tablet   1 capsule, Oral, Daily         Stop These Medications    furosemide 40 MG tablet  Commonly known as: LASIX     HumaLOG KwikPen 100 UNIT/ML solution pen-injector  Generic drug: Insulin Lispro (1 Unit Dial)     lisinopril-hydrochlorothiazide 20-25 MG per tablet  Commonly known as: PRINZIDE,ZESTORETIC            No Known Allergies      Discharge Disposition:   Home or Self Care    Diet:  Hospital:  Diet Order   Procedures   • Diet: Diabetic Diets; Consistent Carbohydrate; Texture: Regular Texture (IDDSI 7); Fluid Consistency: Thin (IDDSI 0)         Discharge Activity:         CODE STATUS:  Code Status and Medical Interventions:   Ordered at: 03/20/23 1439     Code Status (Patient has no pulse and is not breathing):    CPR (Attempt to Resuscitate)     Medical Interventions (Patient has pulse or is breathing):    Full Support         Future Appointments   Date Time Provider Department Center   3/30/2023  2:00 PM [facility]  [facility]   3/30/2023  2:45 PM ROOM 1,  [facility]  [facility] None           Time spent on Discharge including face to face service:  65 minutes    This patient has been examined wearing appropriate Personal Protective Equipment and discussed with Patient. 03/25/23      Signature: Gabriel Casanova  MD

## 2023-03-26 ENCOUNTER — HOSPITAL ENCOUNTER (OUTPATIENT)
Facility: HOSPITAL | Age: 73
Setting detail: OBSERVATION
Discharge: HOME | End: 2023-03-27
Attending: EMERGENCY MEDICINE | Admitting: INTERNAL MEDICINE
Payer: MEDICARE

## 2023-03-26 ENCOUNTER — NURSE TRIAGE (OUTPATIENT)
Dept: CALL CENTER | Facility: HOSPITAL | Age: 73
End: 2023-03-26
Payer: MEDICARE

## 2023-03-26 ENCOUNTER — APPOINTMENT (OUTPATIENT)
Dept: GENERAL RADIOLOGY | Facility: HOSPITAL | Age: 73
End: 2023-03-26
Payer: MEDICARE

## 2023-03-26 DIAGNOSIS — J96.01 ACUTE HYPOXEMIC RESPIRATORY FAILURE: ICD-10-CM

## 2023-03-26 DIAGNOSIS — E87.70 HYPERVOLEMIA, UNSPECIFIED HYPERVOLEMIA TYPE: Primary | ICD-10-CM

## 2023-03-26 LAB
ALBUMIN SERPL-MCNC: 3.8 G/DL (ref 3.5–5.2)
ALBUMIN/GLOB SERPL: 1.5 G/DL
ALP SERPL-CCNC: 114 U/L (ref 39–117)
ALT SERPL W P-5'-P-CCNC: 17 U/L (ref 1–41)
ANION GAP SERPL CALCULATED.3IONS-SCNC: 14 MMOL/L (ref 5–15)
ANION GAP SERPL CALCULATED.3IONS-SCNC: 18 MMOL/L (ref 5–15)
AST SERPL-CCNC: 17 U/L (ref 1–40)
BASOPHILS # BLD MANUAL: 0.13 10*3/MM3 (ref 0–0.2)
BASOPHILS NFR BLD MANUAL: 1 % (ref 0–1.5)
BILIRUB SERPL-MCNC: 0.4 MG/DL (ref 0–1.2)
BUN SERPL-MCNC: 61 MG/DL (ref 8–23)
BUN SERPL-MCNC: 62 MG/DL (ref 8–23)
BUN/CREAT SERPL: 13 (ref 7–25)
BUN/CREAT SERPL: 13.5 (ref 7–25)
CALCIUM SPEC-SCNC: 8.7 MG/DL (ref 8.6–10.5)
CALCIUM SPEC-SCNC: 8.7 MG/DL (ref 8.6–10.5)
CHLORIDE SERPL-SCNC: 100 MMOL/L (ref 98–107)
CHLORIDE SERPL-SCNC: 102 MMOL/L (ref 98–107)
CO2 SERPL-SCNC: 19 MMOL/L (ref 22–29)
CO2 SERPL-SCNC: 23 MMOL/L (ref 22–29)
CREAT SERPL-MCNC: 4.53 MG/DL (ref 0.76–1.27)
CREAT SERPL-MCNC: 4.77 MG/DL (ref 0.76–1.27)
DEPRECATED RDW RBC AUTO: 51.6 FL (ref 37–54)
EGFRCR SERPLBLD CKD-EPI 2021: 12.3 ML/MIN/1.73
EGFRCR SERPLBLD CKD-EPI 2021: 13.1 ML/MIN/1.73
EOSINOPHIL # BLD MANUAL: 0.25 10*3/MM3 (ref 0–0.4)
EOSINOPHIL NFR BLD MANUAL: 2 % (ref 0.3–6.2)
ERYTHROCYTE [DISTWIDTH] IN BLOOD BY AUTOMATED COUNT: 14.6 % (ref 12.3–15.4)
GLOBULIN UR ELPH-MCNC: 2.5 GM/DL
GLUCOSE BLDC GLUCOMTR-MCNC: 336 MG/DL (ref 70–105)
GLUCOSE SERPL-MCNC: 239 MG/DL (ref 65–99)
GLUCOSE SERPL-MCNC: 382 MG/DL (ref 65–99)
HCT VFR BLD AUTO: 27.7 % (ref 37.5–51)
HGB BLD-MCNC: 8.5 G/DL (ref 13–17.7)
HOLD SPECIMEN: NORMAL
LYMPHOCYTES # BLD MANUAL: 1.13 10*3/MM3 (ref 0.7–3.1)
LYMPHOCYTES NFR BLD MANUAL: 8 % (ref 5–12)
MAGNESIUM SERPL-MCNC: 1.5 MG/DL (ref 1.6–2.4)
MCH RBC QN AUTO: 29 PG (ref 26.6–33)
MCHC RBC AUTO-ENTMCNC: 30.6 G/DL (ref 31.5–35.7)
MCV RBC AUTO: 94.8 FL (ref 79–97)
MONOCYTES # BLD: 1.01 10*3/MM3 (ref 0.1–0.9)
MYELOCYTES NFR BLD MANUAL: 3 % (ref 0–0)
NEUTROPHILS # BLD AUTO: 9.7 10*3/MM3 (ref 1.7–7)
NEUTROPHILS NFR BLD MANUAL: 77 % (ref 42.7–76)
NT-PROBNP SERPL-MCNC: ABNORMAL PG/ML (ref 0–900)
PHOSPHATE SERPL-MCNC: 5.2 MG/DL (ref 2.5–4.5)
PLAT MORPH BLD: NORMAL
PLATELET # BLD AUTO: 149 10*3/MM3 (ref 140–450)
PMV BLD AUTO: 10.5 FL (ref 6–12)
POTASSIUM SERPL-SCNC: 5.1 MMOL/L (ref 3.5–5.2)
POTASSIUM SERPL-SCNC: 5.1 MMOL/L (ref 3.5–5.2)
PROT SERPL-MCNC: 6.3 G/DL (ref 6–8.5)
QT INTERVAL: 384 MS
RBC # BLD AUTO: 2.92 10*6/MM3 (ref 4.14–5.8)
RBC MORPH BLD: NORMAL
SCAN SLIDE: NORMAL
SODIUM SERPL-SCNC: 137 MMOL/L (ref 136–145)
SODIUM SERPL-SCNC: 139 MMOL/L (ref 136–145)
TROPONIN T SERPL HS-MCNC: 117 NG/L
TROPONIN T SERPL HS-MCNC: 117 NG/L
VARIANT LYMPHS NFR BLD MANUAL: 9 % (ref 19.6–45.3)
WBC MORPH BLD: NORMAL
WBC NRBC COR # BLD: 12.6 10*3/MM3 (ref 3.4–10.8)

## 2023-03-26 PROCEDURE — 71045 X-RAY EXAM CHEST 1 VIEW: CPT

## 2023-03-26 PROCEDURE — 99285 EMERGENCY DEPT VISIT HI MDM: CPT

## 2023-03-26 PROCEDURE — G0378 HOSPITAL OBSERVATION PER HR: HCPCS

## 2023-03-26 PROCEDURE — 82962 GLUCOSE BLOOD TEST: CPT

## 2023-03-26 PROCEDURE — 93005 ELECTROCARDIOGRAM TRACING: CPT | Performed by: EMERGENCY MEDICINE

## 2023-03-26 PROCEDURE — 84484 ASSAY OF TROPONIN QUANT: CPT | Performed by: EMERGENCY MEDICINE

## 2023-03-26 PROCEDURE — 85007 BL SMEAR W/DIFF WBC COUNT: CPT | Performed by: EMERGENCY MEDICINE

## 2023-03-26 PROCEDURE — 25010000002 HYDRALAZINE PER 20 MG: Performed by: EMERGENCY MEDICINE

## 2023-03-26 PROCEDURE — 83735 ASSAY OF MAGNESIUM: CPT | Performed by: INTERNAL MEDICINE

## 2023-03-26 PROCEDURE — 83880 ASSAY OF NATRIURETIC PEPTIDE: CPT | Performed by: EMERGENCY MEDICINE

## 2023-03-26 PROCEDURE — 96374 THER/PROPH/DIAG INJ IV PUSH: CPT

## 2023-03-26 PROCEDURE — G0257 UNSCHED DIALYSIS ESRD PT HOS: HCPCS

## 2023-03-26 PROCEDURE — 85025 COMPLETE CBC W/AUTO DIFF WBC: CPT | Performed by: EMERGENCY MEDICINE

## 2023-03-26 PROCEDURE — 80053 COMPREHEN METABOLIC PANEL: CPT | Performed by: EMERGENCY MEDICINE

## 2023-03-26 PROCEDURE — 63710000001 INSULIN LISPRO (HUMAN) PER 5 UNITS: Performed by: EMERGENCY MEDICINE

## 2023-03-26 PROCEDURE — 84100 ASSAY OF PHOSPHORUS: CPT | Performed by: INTERNAL MEDICINE

## 2023-03-26 RX ORDER — HYDRALAZINE HYDROCHLORIDE 20 MG/ML
20 INJECTION INTRAMUSCULAR; INTRAVENOUS ONCE
Status: COMPLETED | OUTPATIENT
Start: 2023-03-26 | End: 2023-03-26

## 2023-03-26 RX ORDER — ONDANSETRON 2 MG/ML
4 INJECTION INTRAMUSCULAR; INTRAVENOUS EVERY 6 HOURS PRN
Status: DISCONTINUED | OUTPATIENT
Start: 2023-03-26 | End: 2023-03-27 | Stop reason: HOSPADM

## 2023-03-26 RX ORDER — SODIUM CHLORIDE 0.9 % (FLUSH) 0.9 %
10 SYRINGE (ML) INJECTION AS NEEDED
Status: DISCONTINUED | OUTPATIENT
Start: 2023-03-26 | End: 2023-03-27 | Stop reason: HOSPADM

## 2023-03-26 RX ORDER — MELATONIN
2000 DAILY
Status: DISCONTINUED | OUTPATIENT
Start: 2023-03-26 | End: 2023-03-27 | Stop reason: HOSPADM

## 2023-03-26 RX ORDER — HYDRALAZINE HYDROCHLORIDE 25 MG/1
100 TABLET, FILM COATED ORAL 3 TIMES DAILY
Status: DISCONTINUED | OUTPATIENT
Start: 2023-03-26 | End: 2023-03-27 | Stop reason: HOSPADM

## 2023-03-26 RX ORDER — SODIUM CHLORIDE 9 MG/ML
40 INJECTION, SOLUTION INTRAVENOUS AS NEEDED
Status: DISCONTINUED | OUTPATIENT
Start: 2023-03-26 | End: 2023-03-27 | Stop reason: HOSPADM

## 2023-03-26 RX ORDER — TERAZOSIN 5 MG/1
5 CAPSULE ORAL NIGHTLY
Status: DISCONTINUED | OUTPATIENT
Start: 2023-03-26 | End: 2023-03-26

## 2023-03-26 RX ORDER — ALBUMIN (HUMAN) 12.5 G/50ML
12.5 SOLUTION INTRAVENOUS AS NEEDED
Status: CANCELLED | OUTPATIENT
Start: 2023-03-26 | End: 2023-03-27

## 2023-03-26 RX ORDER — HYDROCODONE BITARTRATE AND ACETAMINOPHEN 5; 325 MG/1; MG/1
1 TABLET ORAL EVERY 4 HOURS PRN
Status: DISCONTINUED | OUTPATIENT
Start: 2023-03-26 | End: 2023-03-27 | Stop reason: HOSPADM

## 2023-03-26 RX ORDER — TAMSULOSIN HYDROCHLORIDE 0.4 MG/1
0.8 CAPSULE ORAL DAILY
Status: DISCONTINUED | OUTPATIENT
Start: 2023-03-26 | End: 2023-03-26

## 2023-03-26 RX ORDER — AMLODIPINE BESYLATE 5 MG/1
10 TABLET ORAL DAILY
Status: DISCONTINUED | OUTPATIENT
Start: 2023-03-26 | End: 2023-03-27 | Stop reason: HOSPADM

## 2023-03-26 RX ORDER — CETIRIZINE HYDROCHLORIDE 10 MG/1
10 TABLET ORAL DAILY
Status: DISCONTINUED | OUTPATIENT
Start: 2023-03-26 | End: 2023-03-27 | Stop reason: HOSPADM

## 2023-03-26 RX ORDER — CYANOCOBALAMIN 1000 UG/ML
1000 INJECTION, SOLUTION INTRAMUSCULAR; SUBCUTANEOUS
Status: DISCONTINUED | OUTPATIENT
Start: 2023-04-19 | End: 2023-03-27 | Stop reason: HOSPADM

## 2023-03-26 RX ORDER — NITROGLYCERIN 0.4 MG/1
0.4 TABLET SUBLINGUAL
Status: DISCONTINUED | OUTPATIENT
Start: 2023-03-26 | End: 2023-03-27 | Stop reason: HOSPADM

## 2023-03-26 RX ORDER — METOCLOPRAMIDE 10 MG/1
10 TABLET ORAL 2 TIMES DAILY
Status: DISCONTINUED | OUTPATIENT
Start: 2023-03-27 | End: 2023-03-27 | Stop reason: HOSPADM

## 2023-03-26 RX ORDER — CALCITRIOL 0.25 UG/1
0.5 CAPSULE, LIQUID FILLED ORAL 3 TIMES WEEKLY
Status: DISCONTINUED | OUTPATIENT
Start: 2023-03-27 | End: 2023-03-27 | Stop reason: HOSPADM

## 2023-03-26 RX ORDER — ASCORBIC ACID 500 MG
500 TABLET ORAL DAILY
Status: DISCONTINUED | OUTPATIENT
Start: 2023-03-26 | End: 2023-03-27 | Stop reason: HOSPADM

## 2023-03-26 RX ORDER — INSULIN LISPRO 100 [IU]/ML
5 INJECTION, SOLUTION INTRAVENOUS; SUBCUTANEOUS ONCE
Status: COMPLETED | OUTPATIENT
Start: 2023-03-26 | End: 2023-03-26

## 2023-03-26 RX ORDER — TERAZOSIN 5 MG/1
10 CAPSULE ORAL EVERY 12 HOURS SCHEDULED
Status: DISCONTINUED | OUTPATIENT
Start: 2023-03-26 | End: 2023-03-27 | Stop reason: HOSPADM

## 2023-03-26 RX ORDER — SODIUM CHLORIDE 0.9 % (FLUSH) 0.9 %
10 SYRINGE (ML) INJECTION EVERY 12 HOURS SCHEDULED
Status: DISCONTINUED | OUTPATIENT
Start: 2023-03-26 | End: 2023-03-27 | Stop reason: HOSPADM

## 2023-03-26 RX ORDER — ONDANSETRON 4 MG/1
4 TABLET, FILM COATED ORAL EVERY 6 HOURS PRN
Status: DISCONTINUED | OUTPATIENT
Start: 2023-03-26 | End: 2023-03-27 | Stop reason: HOSPADM

## 2023-03-26 RX ORDER — LATANOPROST 50 UG/ML
1 SOLUTION/ DROPS OPHTHALMIC NIGHTLY
Status: DISCONTINUED | OUTPATIENT
Start: 2023-03-26 | End: 2023-03-27 | Stop reason: HOSPADM

## 2023-03-26 RX ORDER — HEPARIN SODIUM 1000 [USP'U]/ML
5000 INJECTION, SOLUTION INTRAVENOUS; SUBCUTANEOUS AS NEEDED
Status: CANCELLED | OUTPATIENT
Start: 2023-03-26

## 2023-03-26 RX ORDER — CHOLECALCIFEROL (VITAMIN D3) 125 MCG
5 CAPSULE ORAL NIGHTLY PRN
Status: DISCONTINUED | OUTPATIENT
Start: 2023-03-26 | End: 2023-03-27 | Stop reason: HOSPADM

## 2023-03-26 RX ORDER — MULTIPLE VITAMINS W/ MINERALS TAB 9MG-400MCG
1 TAB ORAL DAILY
Status: DISCONTINUED | OUTPATIENT
Start: 2023-03-26 | End: 2023-03-27 | Stop reason: HOSPADM

## 2023-03-26 RX ORDER — FAMOTIDINE 20 MG/1
10 TABLET, FILM COATED ORAL DAILY
Status: DISCONTINUED | OUTPATIENT
Start: 2023-03-26 | End: 2023-03-27 | Stop reason: HOSPADM

## 2023-03-26 RX ADMIN — OXYCODONE HYDROCHLORIDE AND ACETAMINOPHEN 500 MG: 500 TABLET ORAL at 18:12

## 2023-03-26 RX ADMIN — AMLODIPINE BESYLATE 10 MG: 5 TABLET ORAL at 18:12

## 2023-03-26 RX ADMIN — HYDRALAZINE HYDROCHLORIDE 20 MG: 20 INJECTION INTRAMUSCULAR; INTRAVENOUS at 13:31

## 2023-03-26 RX ADMIN — MULTIPLE VITAMINS W/ MINERALS TAB 1 TABLET: TAB at 18:12

## 2023-03-26 RX ADMIN — HYDRALAZINE HYDROCHLORIDE 100 MG: 25 TABLET, FILM COATED ORAL at 18:13

## 2023-03-26 RX ADMIN — FAMOTIDINE 10 MG: 20 TABLET, FILM COATED ORAL at 18:10

## 2023-03-26 RX ADMIN — Medication 2000 UNITS: at 18:13

## 2023-03-26 RX ADMIN — INSULIN LISPRO 5 UNITS: 100 INJECTION, SOLUTION INTRAVENOUS; SUBCUTANEOUS at 13:31

## 2023-03-26 NOTE — CASE MANAGEMENT/SOCIAL WORK
Discharge Planning Assessment   [facility]     Patient Name: Gerry Torres  MRN: [id number]  Today's Date: 3/26/2023    Admit Date: 3/26/2023    Plan: Anticipate return home with spouse. Current OP HD - CM needs to verify with pt location/days.   Discharge Needs Assessment     Row Name 03/26/23 1831       Living Environment    People in Home spouse    Current Living Arrangements home    Primary Care Provided by self    Provides Primary Care For no one    Family Caregiver if Needed spouse    Quality of Family Relationships supportive    Able to Return to Prior Arrangements yes       Resource/Environmental Concerns    Resource/Environmental Concerns none    Transportation Concerns none       Transition Planning    Patient/Family Anticipates Transition to home with family    Patient/Family Anticipated Services at Transition none    Transportation Anticipated family or friend will provide       Discharge Needs Assessment    Readmission Within the Last 30 Days no previous admission in last 30 days    Current Outpatient/Agency/Support Group outpatient hemodialysis    Equipment Currently Used at Home cpap    Concerns to be Addressed discharge planning    Anticipated Changes Related to Illness none    Equipment Needed After Discharge none               Discharge Plan     Row Name 03/26/23 1832       Plan    Plan Anticipate return home with spouse. Current OP HD - CM needs to verify with pt location/days.    Patient/Family in Agreement with Plan yes    Plan Comments Met with pt and spouse in ED. Pt recently started on dialysis - need to verify days and location. Pt is IADLs and drives. PCP and pharmacy verified. Denies trouble affording home medications. Pt family to transport home at discharge. Cardiology and Nephrology consults.              Continued Care and Services - Admitted Since 3/26/2023           Expected Discharge Date and Time     Expected Discharge Date Expected Discharge Time    Mar 28, 2023           Demographic Summary     Row Name 03/26/23 1830       General Information    Admission Type observation    Arrived From emergency department    Referral Source emergency department    Reason for Consult discharge planning    Preferred Language English               Functional Status     Row Name 03/26/23 1831       Functional Status    Usual Activity Tolerance good    Current Activity Tolerance moderate       Functional Status, IADL    Medications independent    Meal Preparation independent    Housekeeping independent    Laundry independent    Shopping independent       Mental Status    General Appearance WDL WDL       Mental Status Summary    Recent Changes in Mental Status/Cognitive Functioning no changes                      Bryanna Leach RN

## 2023-03-26 NOTE — ED NOTES
Nursing report ED to floor  Gerry Torres  72 y.o.  male    HPI:   Chief Complaint   Patient presents with    Weakness - Generalized       Admitting doctor:   Duke Sumner MD    Admitting diagnosis:   The primary encounter diagnosis was Hypervolemia, unspecified hypervolemia type. A diagnosis of Acute hypoxemic respiratory failure (HCC) was also pertinent to this visit.    Code status:   Current Code Status       Date Active Code Status Order ID Comments User Context       Prior            Allergies:   Patient has no known allergies.    Isolation:  No active isolations     Fall Risk:  Fall Risk Assessment was completed, and patient is at high risk for falls.   Predictive Model Details         69 (High) Factor Value    Calculated 3/26/2023 18:06 Age 72    Risk of Fall Model Active Peripheral IV Present     Imaging order in this encounter Present     Respiratory Rate 22     Musculoskeletal Assessment X     Magnesium 2.3 mg/dL     Financial Class Medicare     Tobacco Use Quit     Creatinine 4.77 mg/dL     Calcium 8.7 mg/dL     Earl Scale 19     Number of Distinct Medication Classes administered 2     Chloride 100 mmol/L     Albumin 3.8 g/dL     Diastolic BP 82     Clinically Relevant Sex Not Female     Gastrointestinal Assessment WDL     Cardiac Assessment WDL     Number of administrations of Anti-Hypertensives 1     Days after Admission 0.26     Potassium 5.1 mmol/L     Skin Assessment X     Peripheral Vascular Assessment X     Total Bilirubin 0.4 mg/dL     ALT 17 U/L         Weight:       03/26/23  1149   Weight: 127 kg (280 lb 13.9 oz)       Intake and Output  No intake or output data in the 24 hours ending 03/26/23 1817    Diet:   Dietary Orders (From admission, onward)       Start     Ordered    03/26/23 1649  Diet: Renal Diets; Low Sodium (2-3g), Low Potassium, Low Phosphorus; Texture: Regular Texture (IDDSI 7); Fluid Consistency: Thin (IDDSI 0)  Diet Effective Now        References:    Diet Order  [facility]   Question Answer Comment   Diets: Renal Diets    Renal Diet: Low Sodium (2-3g)    Renal Diet: Low Potassium    Renal Diet: Low Phosphorus    Texture: Regular Texture (IDDSI 7)    Fluid Consistency: Thin (IDDSI 0)        03/26/23 1649                     Most recent vitals:   Vitals:    03/26/23 1501 03/26/23 1531 03/26/23 1631 03/26/23 1812   BP: 152/61 (!) 188/81 141/82 178/79   BP Location:       Patient Position:       Pulse: 87 92 83 97   Resp:       Temp:       TempSrc:       SpO2: 91% 91% 92%    Weight:       Height:           Active LDAs/IV Access:   Lines, Drains & Airways       Active LDAs       Name Placement date Placement time Site Days    Peripheral IV 03/26/23 1307 Anterior;Left;Upper Arm 03/26/23  1307  Arm  less than 1    Hemodialysis Cath Double 03/20/23  1509  Chest  via RIJ vein  6                    Skin Condition:   Skin Assessments (last day)       None             Labs (abnormal labs have a star):   Labs Reviewed   COMPREHENSIVE METABOLIC PANEL - Abnormal; Notable for the following components:       Result Value    Glucose 382 (*)     BUN 62 (*)     Creatinine 4.77 (*)     eGFR 12.3 (*)     All other components within normal limits    Narrative:     GFR Normal >60  Chronic Kidney Disease <60  Kidney Failure <15    The GFR formula is only valid for adults with stable renal function between ages 18 and 70.   BNP (IN-HOUSE) - Abnormal; Notable for the following components:    proBNP 20,755.0 (*)     All other components within normal limits    Narrative:     Among patients with dyspnea, NT-proBNP is highly sensitive for the detection of acute congestive heart failure. In addition NT-proBNP of <300 pg/ml effectively rules out acute congestive heart failure with 99% negative predictive value.     SINGLE HSTROPONIN T - Abnormal; Notable for the following components:    HS Troponin T 117 (*)     All other components within normal limits    Narrative:     High Sensitive Troponin T Reference  Range:  <10.0 ng/L- Negative Female for AMI  <15.0 ng/L- Negative Male for AMI  >=10 - Abnormal Female indicating possible myocardial injury.  >=15 - Abnormal Male indicating possible myocardial injury.   Clinicians would have to utilize clinical acumen, EKG, Troponin, and serial changes to determine if it is an Acute Myocardial Infarction or myocardial injury due to an underlying chronic condition.        CBC WITH AUTO DIFFERENTIAL - Abnormal; Notable for the following components:    WBC 12.60 (*)     RBC 2.92 (*)     Hemoglobin 8.5 (*)     Hematocrit 27.7 (*)     MCHC 30.6 (*)     All other components within normal limits    Narrative:     The previously reported component NRBC is no longer being reported. Previous result was 0.6 /100 WBC (Reference Range: 0.0-0.2 /100 WBC) on 3/26/2023 at 1330 EDT.   SINGLE HSTROPONIN T - Abnormal; Notable for the following components:    HS Troponin T 117 (*)     All other components within normal limits    Narrative:     High Sensitive Troponin T Reference Range:  <10.0 ng/L- Negative Female for AMI  <15.0 ng/L- Negative Male for AMI  >=10 - Abnormal Female indicating possible myocardial injury.  >=15 - Abnormal Male indicating possible myocardial injury.   Clinicians would have to utilize clinical acumen, EKG, Troponin, and serial changes to determine if it is an Acute Myocardial Infarction or myocardial injury due to an underlying chronic condition.        MANUAL DIFFERENTIAL - Abnormal; Notable for the following components:    Neutrophil % 77.0 (*)     Lymphocyte % 9.0 (*)     Myelocyte % 3.0 (*)     Neutrophils Absolute 9.70 (*)     Monocytes Absolute 1.01 (*)     All other components within normal limits   POCT GLUCOSE FINGERSTICK - Abnormal; Notable for the following components:    Glucose 336 (*)     All other components within normal limits   SCAN SLIDE   BASIC METABOLIC PANEL   MAGNESIUM   PHOSPHORUS   CBC AND DIFFERENTIAL    Narrative:     The following orders were  created for panel order CBC & Differential.  Procedure                               Abnormality         Status                     ---------                               -----------         ------                     CBC Auto Differential[[id number]]        Abnormal            Final result               Scan Slide[[id number]]                                       Final result                 Please view results for these tests on the individual orders.   EXTRA TUBES    Narrative:     The following orders were created for panel order Extra Tubes.  Procedure                               Abnormality         Status                     ---------                               -----------         ------                     Green Top (No Gel)[[id number]]                               Final result                 Please view results for these tests on the individual orders.   GREEN TOP NO GEL       LOC: Person, Place, Time, and Situation    Telemetry:  Med/Surg    Cardiac Monitoring Ordered: yes    EKG:   ECG 12 Lead Dyspnea   Preliminary Result   HEART RATE= 92  bpm   RR Interval= 652  ms   NJ Interval= 221  ms   P Horizontal Axis= 15  deg   P Front Axis= 75  deg   QRSD Interval= 113  ms   QT Interval= 384  ms   QRS Axis= 113  deg   T Wave Axis= 44  deg   - ABNORMAL ECG -   Sinus rhythm   Prolonged NJ interval   Electronically Signed By:    Date and Time of Study: 2023-03-26 11:59:17          Medications Given in the ED:   Medications   sodium chloride 0.9 % flush 10 mL (has no administration in time range)   amLODIPine (NORVASC) tablet 10 mg (10 mg Oral Given 3/26/23 1812)   calcitriol (ROCALTROL) capsule 0.5 mcg (has no administration in time range)   cholecalciferol (VITAMIN D3) tablet 2,000 Units (2,000 Units Oral Given 3/26/23 1813)   cyanocobalamin injection 1,000 mcg (has no administration in time range)   hydrALAZINE (APRESOLINE) tablet 100 mg (100 mg Oral Given 3/26/23 1813)   insulin glargine (LANTUS, SEMGLEE)  injection 30 Units (has no administration in time range)   cetirizine (zyrTEC) tablet 10 mg (has no administration in time range)   latanoprost (XALATAN) 0.005 % ophthalmic solution 1 drop (has no administration in time range)   metoclopramide (REGLAN) tablet 10 mg (has no administration in time range)   metoprolol tartrate (LOPRESSOR) tablet 25 mg (has no administration in time range)   multivitamin with minerals 1 tablet (1 tablet Oral Given 3/26/23 1812)   ascorbic acid (VITAMIN C) tablet 500 mg (500 mg Oral Given 3/26/23 1812)   sodium chloride 0.9 % flush 10 mL (has no administration in time range)   sodium chloride 0.9 % flush 10 mL (has no administration in time range)   sodium chloride 0.9 % infusion 40 mL (has no administration in time range)   nitroglycerin (NITROSTAT) SL tablet 0.4 mg (has no administration in time range)   melatonin tablet 5 mg (has no administration in time range)   HYDROcodone-acetaminophen (NORCO) 5-325 MG per tablet 1 tablet (has no administration in time range)   ondansetron (ZOFRAN) tablet 4 mg (has no administration in time range)     Or   ondansetron (ZOFRAN) injection 4 mg (has no administration in time range)   famotidine (PEPCID) tablet 10 mg (10 mg Oral Given 3/26/23 1810)   terazosin (HYTRIN) capsule 10 mg (has no administration in time range)   hydrALAZINE (APRESOLINE) injection 20 mg (20 mg Intravenous Given 3/26/23 1331)   insulin lispro (ADMELOG) injection 5 Units (5 Units Subcutaneous Given 3/26/23 1331)       Imaging results:  XR Chest 1 View    Result Date: 3/26/2023  Impression: 1. Worsening interstitial pulmonary edema pattern likely representing worsening volume overload. 2. Right-sided tunneled dialysis catheter with tip terminating in the right atrium. Electronically Signed: Alvin Duran  3/26/2023 1:31 PM EDT  Workstation ID: [id number]     Social issues:   Social History     Socioeconomic History    Marital status:    Tobacco Use    Smoking status:  Former     Types: Cigarettes     Quit date: 2018     Years since quittin.3   Vaping Use    Vaping Use: Never used   Substance and Sexual Activity    Alcohol use: Not Currently    Drug use: Yes     Frequency: 5.0 times per week     Types: Marijuana     Comment: occasional gummy    Sexual activity: Defer       NIH Stroke Scale:  Interval: (not recorded)  1a. Level of Consciousness: (not recorded)  1b. LOC Questions: (not recorded)  1c. LOC Commands: (not recorded)  2. Best Gaze: (not recorded)  3. Visual: (not recorded)  4. Facial Palsy: (not recorded)  5a. Motor Arm, Left: (not recorded)  5b. Motor Arm, Right: (not recorded)  6a. Motor Leg, Left: (not recorded)  6b. Motor Leg, Right: (not recorded)  7. Limb Ataxia: (not recorded)  8. Sensory: (not recorded)  9. Best Language: (not recorded)  10. Dysarthria: (not recorded)  11. Extinction and Inattention (formerly Neglect): (not recorded)    Total (NIH Stroke Scale): (not recorded)     Additional notable assessment information:    Nursing report ED to floor:  DARIUS Norton RN   23 18:17 EDT

## 2023-03-26 NOTE — PROGRESS NOTES
D/w ER MD  Pt D/C'd yesterday after HD initiation for uremia and volume overload last week  Dietary indiscretion last night and presents with hypoxia and pulmonary edema  Will plan stat HD for volume tonight and again in AM to keep on his usual MWF schedule. Can likely be D/C'd after dialysis tomorrow

## 2023-03-26 NOTE — ED PROVIDER NOTES
Subjective   History of Present Illness  72-year-old male with history of diabetes, hypertension, hyperlipidemia recently started dialysis during hospital stay presents for generalized weakness, feeling tired, shortness of breath with exertion.  Patient was discharged from the hospital yesterday afternoon.  Was feeling great.  [place]  Feeling good when he went to bed last night.  When he woke up this morning he was very tired.  Felt weak.  Short of breath with exertion.  States he does not feel short of breath when he lays flat.  States swelling may be mildly worse but much better than when he came to the hospital last week.  Did not eat well this morning.  Denies any vomiting or diarrhea.  Denies any chest pain or abdominal pain.  Of note patient was 82% on room air.      Review of Systems   Respiratory: Positive for shortness of breath.    Cardiovascular: Positive for leg swelling.   Neurological:        Positive for generalized weakness.       Past Medical History:   Diagnosis Date   • Diabetes mellitus (HCC)    • Hyperlipidemia    • Hypertension    • Sleep apnea        No Known Allergies    Past Surgical History:   Procedure Laterality Date   • HERNIA REPAIR     • KNEE ARTHROSCOPY Left    • SPLENECTOMY     • TONSILLECTOMY     • VITRECTOMY PARS PLANA Right 10/22/2019    Procedure: 25G VITRECTOMY-ENDOLASER;  Surgeon: Lazarus, Howard S., MD;  Location: [facility] MAIN OR;  Service: Ophthalmology       No family history on file.    Social History     Socioeconomic History   • Marital status:    Tobacco Use   • Smoking status: Former     Types: Cigarettes     Quit date: 2018     Years since quittin.3   Vaping Use   • Vaping Use: Never used   Substance and Sexual Activity   • Alcohol use: Not Currently   • Drug use: Yes     Frequency: 5.0 times per week     Types: Marijuana     Comment: occasional gummy   • Sexual activity: Defer           Objective   Physical  "Exam  Constitutional:  No acute distress.  Head:  Atraumatic.  Normocephalic.   Eyes:  No scleral icterus. Normal conjunctivae  ENT:  Moist mucosa.  No nasal discharge present.  Nasal cannula in place.  Cardiovascular:  Well perfused.  Equal pulses.  Regular rate.  Normal capillary refill.    Pulmonary/Chest:  No respiratory distress.  Airway patent.  No tachypnea.  No accessory muscle usage.  Mildly diminished breath sounds bilaterally.  Abdominal:  Nondistended. Nontender.  Elevated BMI.  Extremities: Bilateral lower extremity peripheral edema.  No Deformity  Skin:  Warm, dry  Neurological:  Alert, awake, and appropriate.  Normal speech.      Procedures           ED Course      BP (!) 195/88   Pulse 88   Temp 97.6 °F (36.4 °C) (Oral)   Resp 22   Ht 182.9 cm (72\")   Wt 127 kg (280 lb 13.9 oz)   SpO2 90%   BMI 38.09 kg/m²   Labs Reviewed   COMPREHENSIVE METABOLIC PANEL - Abnormal; Notable for the following components:       Result Value    Glucose 382 (*)     BUN 62 (*)     Creatinine 4.77 (*)     eGFR 12.3 (*)     All other components within normal limits    Narrative:     GFR Normal >60  Chronic Kidney Disease <60  Kidney Failure <15    The GFR formula is only valid for adults with stable renal function between ages 18 and 70.   BNP (IN-HOUSE) - Abnormal; Notable for the following components:    proBNP 20,755.0 (*)     All other components within normal limits    Narrative:     Among patients with dyspnea, NT-proBNP is highly sensitive for the detection of acute congestive heart failure. In addition NT-proBNP of <300 pg/ml effectively rules out acute congestive heart failure with 99% negative predictive value.     SINGLE HSTROPONIN T - Abnormal; Notable for the following components:    HS Troponin T 117 (*)     All other components within normal limits    Narrative:     High Sensitive Troponin T Reference Range:  <10.0 ng/L- Negative Female for AMI  <15.0 ng/L- Negative Male for AMI  >=10 - Abnormal Female " indicating possible myocardial injury.  >=15 - Abnormal Male indicating possible myocardial injury.   Clinicians would have to utilize clinical acumen, EKG, Troponin, and serial changes to determine if it is an Acute Myocardial Infarction or myocardial injury due to an underlying chronic condition.        CBC WITH AUTO DIFFERENTIAL - Abnormal; Notable for the following components:    WBC 12.60 (*)     RBC 2.92 (*)     Hemoglobin 8.5 (*)     Hematocrit 27.7 (*)     MCHC 30.6 (*)     nRBC 0.6 (*)     All other components within normal limits   POCT GLUCOSE FINGERSTICK - Abnormal; Notable for the following components:    Glucose 336 (*)     All other components within normal limits   SCAN SLIDE   SINGLE HSTROPONIN T   CBC AND DIFFERENTIAL    Narrative:     The following orders were created for panel order CBC & Differential.  Procedure                               Abnormality         Status                     ---------                               -----------         ------                     CBC Auto Differential[[id number]]        Abnormal            Preliminary result         Scan Slide[[id number]]                                       In process                   Please view results for these tests on the individual orders.   EXTRA TUBES    Narrative:     The following orders were created for panel order Extra Tubes.  Procedure                               Abnormality         Status                     ---------                               -----------         ------                     Green Top (No Gel)[[id number]]                               In process                   Please view results for these tests on the individual orders.   GREEN TOP NO GEL     Medications   sodium chloride 0.9 % flush 10 mL (has no administration in time range)   hydrALAZINE (APRESOLINE) injection 20 mg (20 mg Intravenous Given 3/26/23 1331)   insulin lispro (ADMELOG) injection 5 Units (5 Units Subcutaneous Given 3/26/23 1331)      XR Chest 1 View    Result Date: 3/26/2023  Impression: 1. Worsening interstitial pulmonary edema pattern likely representing worsening volume overload. 2. Right-sided tunneled dialysis catheter with tip terminating in the right atrium. Electronically Signed: Alvin Duran  3/26/2023 1:31 PM EDT  Workstation ID: [id number]                                         [facility]  EKG # 1  Signed and interpreted by the EP.  Time Interpreted: 11:59 AM  Rate: 92  Rhythm: Normal sinus rhythm  Axis: Right axis deviation  Intervals: First-degree AV block, prolonged QRS  ST Segments: No acute ischemic changes     Comparison: No significant change from March 20,023 EKG.    Reviewed nephrology note from Dr. Garrett Vasquez from March 25, 2023.  Patient tolerating dialysis well.  Down 20 pounds since admission.  Patient to continue Monday Wednesday Friday dialysis.  Continuing Epogen.  Has appointment later this month for AV fistula mapping.    Patient volume overloaded likely from sodium intake.  Requiring oxygen here.  Given hydralazine for elevated blood pressure.  Discussed with Dr. Vasquez he will place dialysis orders.  Excepted by Dr. Crocker for further evaluation and treatment of hypoxemia.  Suspect elevated troponin most likely second to patient's ESRD.  Will repeat.    Final diagnoses:   Hypervolemia, unspecified hypervolemia type   Acute hypoxemic respiratory failure (HCC)       ED Disposition  ED Disposition     ED Disposition   Decision to Admit    Condition   --    Comment   Level of Care: Med/Surg [1]   Admitting Physician: JALEN CROCKER [[id number]]   Attending Physician: JALEN CROCKER [[id number]]               No follow-up provider specified.       Medication List      No changes were made to your prescriptions during this visit.          Josué Veloz MD  03/26/23 3384

## 2023-03-26 NOTE — TELEPHONE ENCOUNTER
"Discharge BH YULISA extremely tired, new on hemodialysis, schedule for dialysis tomorrow, Blood glucose 309, triage nurse advised patients who are on dialysis commonly have chronic fatigue and to talk with his dialysis nurse tomorrow about how to help with fatigue, triage nurse was triaging the caller for blood glucose of 309, triage nurse advised elevated blood glucose can make you tired, caller stated he was going to the ED now.      Additional Information  • Negative: SEVERE difficulty breathing (e.g., struggling for each breath, speaks in single words)  • Negative: Shock suspected (e.g., cold/pale/clammy skin, too weak to stand, low BP, rapid pulse)  • Negative: Difficult to awaken or acting confused (e.g., disoriented, slurred speech)  • Negative: [1] Fainted > 15 minutes ago AND [2] still feels too weak or dizzy to stand  • Negative: [1] SEVERE weakness (i.e., unable to walk or barely able to walk, requires support) AND [2] new-onset or worsening  • Negative: Sounds like a life-threatening emergency to the triager  • Negative: Weakness of the face, arm or leg on one side of the body  • Negative: [1] Diabetes mellitus AND [2] weakness from low blood sugar (i.e., < 60 mg/dl or 3.5 mmol/l)  • Negative: Heat exhaustion suspected (i.e., dehydration from heat exposure)  • Negative: Chest pain  • Negative: Vomiting is main symptom  • Negative: Diarrhea is main symptom  • Negative: Difficulty breathing  • Negative: Heart beating < 50 beats per minute OR > 140 beats per minute  • Negative: Extra heart beats OR irregular heart beating   (i.e., \"palpitations\")  • Negative: Follows bleeding (e.g., from vomiting, rectum, vagina; Exception: small brief weakness from sight of a small amount blood)  • Negative: Black or tarry bowel movements  • Negative: [1] Drinking very little AND [2] dehydration suspected (e.g., no urine > 12 hours, very dry mouth, very lightheaded)  • Negative: Patient sounds very sick or weak to the " triager  • Negative: [1] MODERATE weakness (i.e., interferes with work, school, normal activities) AND [2] cause unknown  (Exceptions: weakness with acute minor illness, or weakness from poor fluid intake)  • Negative: [1] MODERATE weakness AND [2] from poor fluid intake AND [3] no improvement after 2 hours of rest and fluids  • Negative: [1] Fever > 103 F (39.4 C) AND [2] not able to get the fever down using Fever Care Advice  • Negative: [1] Fever > 101 F (38.3 C) AND [2] age > 60 years  • Negative: [1] Fever > 100.0 F (37.8 C) AND [2] bedridden (e.g., nursing home patient, CVA, chronic illness, recovering from surgery)  • Negative: [1] Fever > 100.0 F (37.8 C) AND [2] diabetes mellitus or weak immune system (e.g., HIV positive, cancer chemo, splenectomy, organ transplant, chronic steroids)  • Negative: Pale skin (pallor)  • Negative: [1] MODERATE weakness (i.e., interferes with work, school, normal activities) AND [2] persists > 3 days  • Negative: Taking a medicine that could cause weakness (e.g., blood pressure medications, diuretics)  • Negative: [1] MILD weakness (i.e., does not interfere with ability to work, go to school, normal activities) AND [2] persists > 1 week  • Negative: [1] Fatigue (i.e., tires easily, decreased energy) AND [2] persists > 1 week  • Negative: Weakness is a chronic symptom (recurrent or ongoing AND present > 4 weeks)  • Negative: Fatigue is a chronic symptom (recurrent or ongoing AND present > 4 weeks)  • Negative: Poor fluid intake probably caused the weakness  • Negative: Mild weakness or fatigue with acute minor illness (e.g., colds)  • Negative: Unconscious or difficult to awaken  • Negative: Acting confused (e.g., disoriented, slurred speech)  • Negative: Very weak (e.g., can't stand)  • Negative: Sounds like a life-threatening emergency to the triager  • Negative: [1] Vomiting AND [2] signs of dehydration (e.g., very dry mouth, lightheaded, dark urine)  • Negative: [1] Blood  "glucose > 240 mg/dL (13.3 mmol/L) AND [2] rapid breathing    Answer Assessment - Initial Assessment Questions  1. DESCRIPTION: \"Describe how you are feeling.\"      Very fatigue feels like he has been hit by a sledge hammer  2. SEVERITY: \"How bad is it?\"  \"Can you stand and walk?\"    - MILD - Feels weak or tired, but does not interfere with work, school or normal activities    - MODERATE - Able to stand and walk; weakness interferes with work, school, or normal activities    - SEVERE - Unable to stand or walk  Moderate  3. ONSET:  \"When did the weakness begin?\"   today  4. CAUSE: \"What do you think is causing the weakness?\"  New on dialysis   5. MEDICINES: \"Have you recently started a new medicine or had a change in the amount of a medicine?\"  no  6. OTHER SYMPTOMS: \"Do you have any other symptoms?\" (e.g., chest pain, fever, cough, SOB, vomiting, diarrhea, bleeding, other areas of pain)  Denies  7. PREGNANCY: \"Is there any chance you are pregnant?\" \"When was your last menstrual period?\"  NA    Answer Assessment - Initial Assessment Questions  1. BLOOD GLUCOSE: \"What is your blood glucose level?\"       309  2. ONSET: \"When did you check the blood glucose?\"  1045 am   3. USUAL RANGE: \"What is your glucose level usually?\" (e.g., usual fasting morning value, usual evening value)    Up and down around 250  4. KETONES: \"Do you check for ketones (urine or blood test strips)?\" If yes, ask: \"What does the test show now?\"         5. TYPE 1 or 2:  \"Do you know what type of diabetes you have?\"  (e.g., Type 1, Type 2, Gestational; doesn't know)   Type 1  6. INSULIN: \"Do you take insulin?\" \"What type of insulin(s) do you use? What is the mode of delivery? (syringe, pen; injection or pump)?\"      7. DIABETES PILLS: \"Do you take any pills for your diabetes?\" If yes, ask: \"Have you missed taking any pills recently?\"    8. OTHER SYMPTOMS: \"Do you have any symptoms?\" (e.g., fever, frequent urination, difficulty breathing, dizziness, " "weakness, vomiting)    9. PREGNANCY: \"Is there any chance you are pregnant?\" \"When was your last menstrual period?\"  na    Protocols used: WEAKNESS (GENERALIZED) AND FATIGUE-ADULT-AH, DIABETES - HIGH BLOOD SUGAR-ADULT-AH      "

## 2023-03-26 NOTE — H&P
[facility] Medicine Services      Patient Name: Gerry Torres  : 1950  MRN: [id number]  Primary Care Physician:  Mary Le MD  Date of admission: 3/26/2023      Subjective      Chief Complaint:  c/o generalized weakness, feeling tired, shortness of breath with exertion.       History of Present Illness: Gerry Torres is a 72 y.o. male with past medical history significant for diabetes, hypertension, hyperlipidemia  who presented to [facility] on 3/26/2023 complaining of patient is c/o generalized weakness, feeling tired, shortness of breath with exertion.   he recently started dialysis during hospital stay, Patient was discharged from the hospital yesterday afternoon.  Was feeling great.  He and his wife went to a icomply [place].  Feeling good when he went to bed last night.  When he woke up this morning he was very tired.  Felt weak.  Short of breath with exertion.  States he does not feel short of breath when he lays flat.  Stated swelling may be mildly worse but much better than when he came to the hospital last week.  Did not eat well this morning.  Denies any vomiting or diarrhea.  Denies any chest pain or abdominal pain.  Of note patient was 82% on room air in ER.    XR Chest-Result Date: 3/26/2023-Impression: 1. Worsening interstitial pulmonary edema pattern likely representing worsening volume overload. 2. Right-sided tunneled dialysis catheter with tip terminating in the right atrium. Electronically Signed: Alvin Duran         EKG # 1-Signed and interpreted by the EP.Time Interpreted: 11:59 AM=Rate: 92, Rhythm: Normal sinus rhythm. Axis: Right axis deviation, Intervals: First-degree AV block, prolonged QRS,ST Segments: No acute ischemic changes.  Comparison: No significant change from  EKG.  Reviewed nephrology note from Dr. Garrett Vasquez from 2023.  Patient tolerating dialysis well.  Down 20 pounds since admission.  Patient to  continue Monday Wednesday Friday dialysis.  Continuing Epogen.  Has appointment later this month for AV fistula mapping.       ROS Review of Systems   Respiratory: Positive for shortness of breath.    Cardiovascular: Positive for leg swelling.   Neurological: Positive for generalized weakness.     Personal History     Past Medical History:   Diagnosis Date   • Diabetes mellitus (HCC)    • Hyperlipidemia    • Hypertension    • Sleep apnea        Past Surgical History:   Procedure Laterality Date   • HERNIA REPAIR  1962   • KNEE ARTHROSCOPY Left 1979   • SPLENECTOMY     • TONSILLECTOMY  1974   • VITRECTOMY PARS PLANA Right 10/22/2019    Procedure: 25G VITRECTOMY-ENDOLASER;  Surgeon: Lazarus, Howard S., MD;  Location: [facility] MAIN OR;  Service: Ophthalmology       Family History: family history is not on file. Otherwise pertinent FHx was reviewed and not pertinent to current issue.    Social History:  reports that he quit smoking about 4 years ago. His smoking use included cigarettes. He does not have any smokeless tobacco history on file. He reports that he does not currently use alcohol. He reports current drug use. Frequency: 5.00 times per week. Drug: Marijuana.    Home Medications:  Prior to Admission Medications     Prescriptions Last Dose Informant Patient Reported? Taking?    amLODIPine (NORVASC) 10 MG tablet 3/25/2023  Yes Yes    Take 1 tablet by mouth Daily.    calcitriol (ROCALTROL) 0.5 MCG capsule 3/24/2023  Yes No    Take 1 capsule by mouth 3 (Three) Times a Week. Monday, Wednesday and Friday    Cholecalciferol (VITAMIN D3) 2000 units tablet 3/25/2023  Yes Yes    Take 1 tablet by mouth Daily.    cyanocobalamin 1000 MCG/ML injection 3/19/2023  Yes No    Inject 1 mL into the appropriate muscle as directed by prescriber Every 30 (Thirty) Days.    doxazosin (CARDURA) 8 MG tablet 3/25/2023  Yes Yes    Take 1 tablet by mouth 2 (Two) Times a Day.    hydrALAZINE (APRESOLINE) 100 MG tablet 3/25/2023  Yes Yes    Take  1 tablet by mouth 3 (Three) Times a Day.    ibandronate (BONIVA) 150 MG tablet 3/1/2023  Yes Yes    Take 1 tablet by mouth Every 30 (Thirty) Days.    Insulin Glargine, 2 Unit Dial, (Toujeo Max SoloStar) 300 UNIT/ML solution pen-injector injection 3/25/2023  No Yes    Inject 20 Units under the skin into the appropriate area as directed Daily for 30 days.    levocetirizine (XYZAL) 5 MG tablet 3/25/2023  Yes Yes    Take 1 tablet by mouth Every Evening.    LUMIGAN 0.01 % ophthalmic drops 3/25/2023  Yes Yes    Administer 1 drop to both eyes Every Night. Instill 1 drop into both eyes daily at bedtime    metoclopramide (REGLAN) 10 MG tablet 3/25/2023  Yes Yes    Take 1 tablet by mouth 2 (Two) Times a Day. Morning and lunch    metoprolol tartrate (LOPRESSOR) 25 MG tablet 3/25/2023  Yes Yes    Take 1 tablet by mouth 2 (Two) Times a Day.    Multiple Vitamins-Minerals (GWENDOLYN MULTIVITAMIN FOR MEN) tablet 3/25/2023  Yes Yes    Take 1 tablet by mouth Daily. Take one daily    tamsulosin (FLOMAX) 0.4 MG capsule 24 hr capsule 3/25/2023  Yes Yes    Take 2 capsules by mouth every night at bedtime.    vitamin C (ASCORBIC ACID) 500 MG tablet 3/25/2023  Yes Yes    Take 1 tablet by mouth Daily.            Allergies:  No Known Allergies    Objective      Vitals:   Temp:  [97.6 °F (36.4 °C)] 97.6 °F (36.4 °C)  Heart Rate:  [87-95] 91  Resp:  [22] 22  BP: (173-200)/(67-90) 173/76  Flow (L/min):  [2] 2    Physical Exam  Vitals and nursing note reviewed.   Constitutional:       General: He is not in acute distress.     Appearance: He is well-developed. He is obese. He is not ill-appearing, toxic-appearing or diaphoretic.   HENT:      Head: Normocephalic and atraumatic.      Nose: Nose normal. No congestion or rhinorrhea.      Mouth/Throat:      Mouth: Mucous membranes are moist.      Pharynx: No oropharyngeal exudate.   Eyes:      General: No scleral icterus.        Right eye: No discharge.         Left eye: No discharge.      Extraocular  Movements: Extraocular movements intact.      Pupils: Pupils are equal, round, and reactive to light.   Neck:      Thyroid: No thyromegaly.      Vascular: No carotid bruit or JVD.      Trachea: No tracheal deviation.   Cardiovascular:      Rate and Rhythm: Normal rate and regular rhythm.      Pulses: Normal pulses.      Heart sounds: Normal heart sounds. No murmur heard.    No friction rub. No gallop.   Pulmonary:      Effort: Pulmonary effort is normal. No respiratory distress.      Breath sounds: No wheezing or rales.      Comments: Decreased   Abdominal:      General: Bowel sounds are normal. There is no distension.      Palpations: Abdomen is soft.      Tenderness: There is no abdominal tenderness.   Musculoskeletal:         General: Swelling present. No tenderness, deformity or signs of injury. Normal range of motion.      Cervical back: Normal range of motion and neck supple. No rigidity. No muscular tenderness.      Right lower leg: Edema present.      Left lower leg: Edema present.   Lymphadenopathy:      Cervical: No cervical adenopathy.   Skin:     General: Skin is warm and dry.      Coloration: Skin is not jaundiced or pale.      Findings: No bruising, erythema or rash.   Neurological:      General: No focal deficit present.      Mental Status: He is alert and oriented to person, place, and time. Mental status is at baseline.      Cranial Nerves: No cranial nerve deficit.      Sensory: No sensory deficit.      Motor: Weakness present. No abnormal muscle tone.      Coordination: Coordination normal.   Psychiatric:         Mood and Affect: Mood normal.         Behavior: Behavior normal.         Thought Content: Thought content normal.         Judgment: Judgment normal.          Result Review    Result Review:  I have personally reviewed the results from the time of this admission to 3/26/2023 14:46 EDT and agree with these findings:  []  Laboratory  []  Microbiology  []  Radiology  []  EKG/Telemetry   []   Cardiology/Vascular   []  Pathology  []  Old records  []  Other:  Most notable findings include:  CMP:      Lab 03/26/23  1241 03/24/23  0750 03/23/23  1528 03/23/23  0004 03/22/23  0430 03/21/23  0504 03/20/23  1005   SODIUM 137 135*  --  138 135* 140 140   POTASSIUM 5.1 4.9  --  4.8 4.6 5.0 5.7*   CHLORIDE 100 100  --  101 99 105 107   CO2 23.0 24.0  --  24.0 21.0* 17.0* 14.0*   ANION GAP 14.0 11.0  --  13.0 15.0 18.0* 19.0*   BUN 62* 45*  --  67* 63* 90* 110*   CREATININE 4.77* 3.98*  --  5.27* 5.01* 6.41* 6.89*   EGFR 12.3* 15.3*  --  11.0* 11.6* 8.7* 7.9*   GLUCOSE 382* 230*  --  113* 68 136* 167*   CALCIUM 8.7 7.6*  --  7.8* 7.8* 7.7* 7.8*   MAGNESIUM  --  2.3 1.7 1.5* 1.6 1.7  --    PHOSPHORUS  --  4.3  --  5.6* 6.6* 9.5*  --    TOTAL PROTEIN 6.3 6.1  --  5.8* 6.3 5.8* 6.1   ALBUMIN 3.8 3.7  --  3.6 3.8 3.6 3.9   GLOBULIN 2.5 2.4  --  2.2 2.5 2.2 2.2   ALT (SGPT) 17 18  --  8 12 12 12   AST (SGOT) 17 35  --  20 21 16 10   BILIRUBIN 0.4 0.3  --  0.2 0.3 0.3 0.3   ALK PHOS 114 130*  --  97 100 95 100   LIPASE  --   --   --   --   --   --  7*     CBC:      Lab 03/26/23  1324 03/24/23  0750 03/23/23  0004 03/22/23  0909 03/21/23  0504 03/20/23  1005   WBC 12.60* 7.40 10.60 9.80 8.70 9.80   HEMOGLOBIN 8.5* 8.1* 7.9* 8.0* 8.0* 8.8*   HEMATOCRIT 27.7* 25.8* 24.2* 25.4* 25.6* 28.2*   PLATELETS 149 120* 127* 126* 131* 164   NEUTROS ABS 9.70* 4.51 6.80 7.00 6.40 7.30*   LYMPHS ABS  --   --  1.50 1.10 0.90 1.20   MONOS ABS  --   --  1.80* 1.50* 1.10* 1.10*   EOS ABS 0.25 0.52* 0.30 0.20 0.10 0.20   MCV 94.8 95.6 92.3 93.3 94.1 94.3     Assessment & Plan        Active Hospital Problems:  There are no active hospital problems to display for this patient.    ESRD on HD  -Nephology consult  -s/p Tunnel cath placement 3/20/2023 for HD-- plan for fistula later this month  -follow K+  -renal diet      Shortness of breath, elevated BNP/elevated troponin, lower extremity edema  -Elevated troponin likely due to new onset  ESRD  -Normal LV function and RV function on echocardiogram  -EKG without acute ST changes  -St[facility] from [facility] 1/2023 negative for ischemia     Hypotension, history HTN  -Blood pressure trending up  -Resume home antihypertensive     Back pain, CVA tenderness  -UA culture negative  -Back pain resolving     Normocytic anemia of CKD  -epogen per renal     Thrombocytopenia  -Probably reactive, continue to trend     DM 1, hypoglycemic overnight   -10 units of insulin started March 24 monitor closely.      Pancreatic cancer, renal mass s/p whipple procedure/splenectomy partial nephrectomy 2022     GREGG  -Home CPAP  - O2 hs while in hospital     Hypomagnesium  -Recheck serum level after dialysis     -Initiate PT/OT for discharge assessment    DVT prophylaxis:  No DVT prophylaxis order currently exists.    CODE STATUS:       Admission Status:  I believe this patient meets obs status.    I discussed the patient's findings and my recommendations with patient.    This patient has been examined wearing appropriate Personal Protective Equipment and discussed with rn. 03/26/23      Signature: Electronically signed by Duke Sumner MD, 03/26/23, 2:46 PM EDT.

## 2023-03-26 NOTE — OUTREACH NOTE
Prep Survey    Flowsheet Row Responses   [facility] facility patient discharged from? [facility]   Is LACE score < 7 ? No   Eligibility Readm Mgmt   Discharge diagnosis Acute kidney injury superimposed on CKD   Does the patient have one of the following disease processes/diagnoses(primary or secondary)? Other   Does the patient have Home health ordered? No   Is there a DME ordered? No   Comments regarding appointments New OP HD set up: [facility]. Start date Monday 3/27   Prep survey completed? Yes          Yasmeen STRANGE - Registered Nurse

## 2023-03-27 ENCOUNTER — READMISSION MANAGEMENT (OUTPATIENT)
Dept: CALL CENTER | Facility: HOSPITAL | Age: 73
End: 2023-03-27
Payer: MEDICARE

## 2023-03-27 VITALS
DIASTOLIC BLOOD PRESSURE: 66 MMHG | WEIGHT: 269.4 LBS | TEMPERATURE: 96.6 F | BODY MASS INDEX: 36.49 KG/M2 | HEIGHT: 72 IN | HEART RATE: 54 BPM | SYSTOLIC BLOOD PRESSURE: 170 MMHG | RESPIRATION RATE: 11 BRPM | OXYGEN SATURATION: 94 %

## 2023-03-27 LAB
ANION GAP SERPL CALCULATED.3IONS-SCNC: 12 MMOL/L (ref 5–15)
BUN SERPL-MCNC: 36 MG/DL (ref 8–23)
BUN/CREAT SERPL: 11 (ref 7–25)
CALCIUM SPEC-SCNC: 8.9 MG/DL (ref 8.6–10.5)
CHLORIDE SERPL-SCNC: 99 MMOL/L (ref 98–107)
CHOLEST SERPL-MCNC: 131 MG/DL (ref 0–200)
CO2 SERPL-SCNC: 24 MMOL/L (ref 22–29)
CREAT SERPL-MCNC: 3.26 MG/DL (ref 0.76–1.27)
EGFRCR SERPLBLD CKD-EPI 2021: 19.5 ML/MIN/1.73
GLUCOSE BLDC GLUCOMTR-MCNC: 171 MG/DL (ref 70–105)
GLUCOSE SERPL-MCNC: 358 MG/DL (ref 65–99)
HDLC SERPL-MCNC: 39 MG/DL (ref 40–60)
LDLC SERPL CALC-MCNC: 70 MG/DL (ref 0–100)
LDLC/HDLC SERPL: 1.72 {RATIO}
MAGNESIUM SERPL-MCNC: 1.7 MG/DL (ref 1.6–2.4)
PHOSPHATE SERPL-MCNC: 4.8 MG/DL (ref 2.5–4.5)
POTASSIUM SERPL-SCNC: 4.9 MMOL/L (ref 3.5–5.2)
PROCALCITONIN SERPL-MCNC: 0.24 NG/ML (ref 0–0.25)
SODIUM SERPL-SCNC: 135 MMOL/L (ref 136–145)
TRIGL SERPL-MCNC: 125 MG/DL (ref 0–150)
VLDLC SERPL-MCNC: 22 MG/DL (ref 5–40)
WHOLE BLOOD HOLD SPECIMEN: NORMAL

## 2023-03-27 PROCEDURE — 99232 SBSQ HOSP IP/OBS MODERATE 35: CPT | Performed by: INTERNAL MEDICINE

## 2023-03-27 PROCEDURE — 36415 COLL VENOUS BLD VENIPUNCTURE: CPT | Performed by: INTERNAL MEDICINE

## 2023-03-27 PROCEDURE — 83735 ASSAY OF MAGNESIUM: CPT | Performed by: INTERNAL MEDICINE

## 2023-03-27 PROCEDURE — 80048 BASIC METABOLIC PNL TOTAL CA: CPT | Performed by: INTERNAL MEDICINE

## 2023-03-27 PROCEDURE — 80061 LIPID PANEL: CPT | Performed by: INTERNAL MEDICINE

## 2023-03-27 PROCEDURE — 25010000002 HEPARIN (PORCINE) PER 1000 UNITS: Performed by: INTERNAL MEDICINE

## 2023-03-27 PROCEDURE — 84100 ASSAY OF PHOSPHORUS: CPT | Performed by: INTERNAL MEDICINE

## 2023-03-27 PROCEDURE — 82962 GLUCOSE BLOOD TEST: CPT

## 2023-03-27 PROCEDURE — 63710000001 INSULIN GLARGINE PER 5 UNITS: Performed by: INTERNAL MEDICINE

## 2023-03-27 PROCEDURE — G0378 HOSPITAL OBSERVATION PER HR: HCPCS

## 2023-03-27 PROCEDURE — 84145 PROCALCITONIN (PCT): CPT | Performed by: INTERNAL MEDICINE

## 2023-03-27 RX ORDER — FUROSEMIDE 80 MG
80 TABLET ORAL 2 TIMES DAILY
Qty: 60 TABLET | Refills: 0 | Status: SHIPPED | OUTPATIENT
Start: 2023-03-27 | End: 2023-03-27 | Stop reason: SDUPTHER

## 2023-03-27 RX ORDER — HEPARIN SODIUM 1000 [USP'U]/ML
4000 INJECTION, SOLUTION INTRAVENOUS; SUBCUTANEOUS ONCE
Status: DISCONTINUED | OUTPATIENT
Start: 2023-03-27 | End: 2023-03-27 | Stop reason: HOSPADM

## 2023-03-27 RX ORDER — FUROSEMIDE 40 MG/1
80 TABLET ORAL
Status: DISCONTINUED | OUTPATIENT
Start: 2023-03-27 | End: 2023-03-27 | Stop reason: HOSPADM

## 2023-03-27 RX ORDER — CARVEDILOL 6.25 MG/1
12.5 TABLET ORAL 2 TIMES DAILY WITH MEALS
Status: DISCONTINUED | OUTPATIENT
Start: 2023-03-27 | End: 2023-03-27 | Stop reason: HOSPADM

## 2023-03-27 RX ORDER — CARVEDILOL 12.5 MG/1
12.5 TABLET ORAL 2 TIMES DAILY WITH MEALS
Qty: 60 TABLET | Refills: 0 | Status: SHIPPED | OUTPATIENT
Start: 2023-03-27

## 2023-03-27 RX ORDER — FUROSEMIDE 80 MG
80 TABLET ORAL 2 TIMES DAILY
Qty: 60 TABLET | Refills: 10 | Status: CANCELLED | OUTPATIENT
Start: 2023-03-27

## 2023-03-27 RX ORDER — FUROSEMIDE 80 MG
80 TABLET ORAL 2 TIMES DAILY
Qty: 60 TABLET | Refills: 0 | Status: SHIPPED | OUTPATIENT
Start: 2023-03-27

## 2023-03-27 RX ORDER — CARVEDILOL 12.5 MG/1
12.5 TABLET ORAL 2 TIMES DAILY WITH MEALS
Qty: 60 TABLET | Refills: 0 | Status: SHIPPED | OUTPATIENT
Start: 2023-03-27 | End: 2023-03-27 | Stop reason: SDUPTHER

## 2023-03-27 RX ORDER — HEPARIN SODIUM 1000 [USP'U]/ML
4000 INJECTION, SOLUTION INTRAVENOUS; SUBCUTANEOUS ONCE
Status: COMPLETED | OUTPATIENT
Start: 2023-03-27 | End: 2023-03-27

## 2023-03-27 RX ADMIN — METOCLOPRAMIDE 10 MG: 10 TABLET ORAL at 09:10

## 2023-03-27 RX ADMIN — HYDRALAZINE HYDROCHLORIDE 100 MG: 25 TABLET, FILM COATED ORAL at 09:10

## 2023-03-27 RX ADMIN — CARVEDILOL 12.5 MG: 6.25 TABLET, FILM COATED ORAL at 18:12

## 2023-03-27 RX ADMIN — TERAZOSIN HYDROCHLORIDE 10 MG: 5 CAPSULE ORAL at 09:10

## 2023-03-27 RX ADMIN — CETIRIZINE HYDROCHLORIDE 10 MG: 10 TABLET, FILM COATED ORAL at 01:02

## 2023-03-27 RX ADMIN — INSULIN GLARGINE 30 UNITS: 100 INJECTION, SOLUTION SUBCUTANEOUS at 01:02

## 2023-03-27 RX ADMIN — HEPARIN SODIUM 4000 UNITS: 1000 INJECTION INTRAVENOUS; SUBCUTANEOUS at 17:17

## 2023-03-27 RX ADMIN — TERAZOSIN HYDROCHLORIDE 10 MG: 5 CAPSULE ORAL at 01:02

## 2023-03-27 RX ADMIN — Medication 10 ML: at 01:03

## 2023-03-27 RX ADMIN — METOPROLOL TARTRATE 25 MG: 25 TABLET, FILM COATED ORAL at 09:10

## 2023-03-27 RX ADMIN — HYDRALAZINE HYDROCHLORIDE 100 MG: 25 TABLET, FILM COATED ORAL at 01:02

## 2023-03-27 RX ADMIN — METOPROLOL TARTRATE 25 MG: 25 TABLET, FILM COATED ORAL at 01:02

## 2023-03-27 RX ADMIN — MULTIPLE VITAMINS W/ MINERALS TAB 1 TABLET: TAB at 09:10

## 2023-03-27 RX ADMIN — CALCITRIOL CAPSULES 0.25 MCG 0.5 MCG: 0.25 CAPSULE ORAL at 09:10

## 2023-03-27 RX ADMIN — FUROSEMIDE 80 MG: 40 TABLET ORAL at 18:12

## 2023-03-27 RX ADMIN — Medication 2000 UNITS: at 09:10

## 2023-03-27 RX ADMIN — AMLODIPINE BESYLATE 10 MG: 5 TABLET ORAL at 09:10

## 2023-03-27 RX ADMIN — Medication 10 ML: at 09:10

## 2023-03-27 RX ADMIN — FAMOTIDINE 10 MG: 20 TABLET, FILM COATED ORAL at 09:10

## 2023-03-27 RX ADMIN — OXYCODONE HYDROCHLORIDE AND ACETAMINOPHEN 500 MG: 500 TABLET ORAL at 09:10

## 2023-03-27 RX ADMIN — FUROSEMIDE 80 MG: 40 TABLET ORAL at 09:22

## 2023-03-27 NOTE — CONSULTS
Referring Provider: Duke Sumner MD    Reason for Consultation:  Elevated Troponin      Patient Care Team:  Mary Le MD as PCP - General (Family Medicine)  Fernando Burch MD as Consulting Physician (Hematology and Oncology)      SUBJECTIVE     Chief Complaint:  LOPES    History of present illness:  Gerry Torres is a 72 y.o. male with a history of diabetes, hypertension, hyperlipidemia, pancreatic cancer status post Whipple procedure and splenectomy and partial nephrectomy in 2022, end-stage renal disease who presented to [facility] with complaint of dyspnea on exertion and weakness after recent hospitalization.  He was recently admitted to [facility] from 3/20/2023 to 3/25/2023 with severe volume overload.  At that time he was started on dialysis.  He also had issues with hypoglycemia and endocrinology was following him for that.  He was aggressively dialyzed and was down 20 pounds since admission when he was discharged.  At that time he had an echocardiogram which showed an EF of 57% with mild MR and mild TR.  He was also noted to have a trivial to small pericardial effusion noted.  He did well the first night after discharge but the next morning woke up feeling short of breath with exertion and weak.    On admission to the ER he was noted to have high-sensitivity troponin of 117x2.  His last troponin done on the 21st was 96.  proBNP was 20,000.Chest x-ray showed bilateral pulmonary edema with volume overload.     EKG showed sinus rhythm with a rate of 92 bpm and first-degree AV block.    He underwent urgent hemodialysis overnight and had 3.5 L of UF.  He has been started on oral Lasix with 80 mg twice daily.  His symptoms have improved significantly.  I saw the patient while he was in dialysis.  Plan is to remove another 4 L today.  He says his symptoms have significantly improved and he appears to be back to baseline.  Denies any chest pain, jaw pain, numbness or  tingling in his arms.  No previous cardiac history.  Denies any orthopnea or PND.    Previous cardiac work-up includes a stress test in 2023 done at [facility] which was negative for ischemia      Review of systems:    Constitutional: No weakness, fatigue, fever, rigors, chills   Eyes: No vision changes, eye pain   ENT/oropharynx: No difficulty swallowing, sore throat, epistaxis, changes in hearing   Cardiovascular: No chest pain, chest tightness, palpitations, paroxysmal nocturnal dyspnea, orthopnea, diaphoresis, dizziness / syncopal episode   Respiratory: + shortness of breath, dyspnea on exertion, cough, wheezing, hemoptysis   Gastrointestinal: No abdominal pain, nausea, vomiting, diarrhea, bloody stools   Genitourinary: No hematuria, dysuria   Neurological: No headache, tremors, numbness, one-sided weakness    Musculoskeletal: No cramps, myalgias, joint pain, joint swelling   Integument: No rash, edema        Personal History:      Past Medical History:   Diagnosis Date   • Diabetes mellitus (HCC)    • Hyperlipidemia    • Hypertension    • Sleep apnea        Past Surgical History:   Procedure Laterality Date   • HERNIA REPAIR     • KNEE ARTHROSCOPY Left    • SPLENECTOMY     • TONSILLECTOMY     • VITRECTOMY PARS PLANA Right 10/22/2019    Procedure: 25G VITRECTOMY-ENDOLASER;  Surgeon: Lazarus, Howard S., MD;  Location: [facility];  Service: Ophthalmology       History reviewed. No pertinent family history.    Social History     Tobacco Use   • Smoking status: Former     Packs/day: 1.00     Years: 49.00     Pack years: 49.00     Types: Cigarettes     Quit date: 2018     Years since quittin.3   Vaping Use   • Vaping Use: Never used   Substance Use Topics   • Alcohol use: Not Currently   • Drug use: Yes     Frequency: 5.0 times per week     Types: Marijuana     Comment: occasional gummy        Medications Prior to Admission   Medication Sig Dispense Refill Last Dose   •  amLODIPine (NORVASC) 10 MG tablet Take 1 tablet by mouth Daily.   3/25/2023   • Cholecalciferol (VITAMIN D3) 2000 units tablet Take 1 tablet by mouth Daily.   3/25/2023   • doxazosin (CARDURA) 8 MG tablet Take 1 tablet by mouth 2 (Two) Times a Day.   3/25/2023   • hydrALAZINE (APRESOLINE) 100 MG tablet Take 1 tablet by mouth 3 (Three) Times a Day.   3/25/2023   • ibandronate (BONIVA) 150 MG tablet Take 1 tablet by mouth Every 30 (Thirty) Days.   3/1/2023   • Insulin Glargine, 2 Unit Dial, (Toujeo Santos SoloStar) 300 UNIT/ML solution pen-injector injection Inject 20 Units under the skin into the appropriate area as directed Daily for 30 days. 2 mL 0 3/25/2023   • levocetirizine (XYZAL) 5 MG tablet Take 1 tablet by mouth Every Evening.   3/25/2023   • LUMIGAN 0.01 % ophthalmic drops Administer 1 drop to both eyes Every Night. Instill 1 drop into both eyes daily at bedtime  2 3/25/2023   • metoclopramide (REGLAN) 10 MG tablet Take 1 tablet by mouth 2 (Two) Times a Day. Morning and lunch   3/25/2023   • metoprolol tartrate (LOPRESSOR) 25 MG tablet Take 1 tablet by mouth 2 (Two) Times a Day.   3/25/2023   • Multiple Vitamins-Minerals (GWENDOLYN MULTIVITAMIN FOR MEN) tablet Take 1 tablet by mouth Daily. Take one daily   3/25/2023   • tamsulosin (FLOMAX) 0.4 MG capsule 24 hr capsule Take 2 capsules by mouth every night at bedtime.   3/25/2023   • vitamin C (ASCORBIC ACID) 500 MG tablet Take 1 tablet by mouth Daily.   3/25/2023   • calcitriol (ROCALTROL) 0.5 MCG capsule Take 1 capsule by mouth 3 (Three) Times a Week. Monday, Wednesday and Friday   3/24/2023   • cyanocobalamin 1000 MCG/ML injection Inject 1 mL into the appropriate muscle as directed by prescriber Every 30 (Thirty) Days.   3/19/2023        Allergies:     Patient has no known allergies.    Scheduled Meds:amLODIPine, 10 mg, Oral, Daily  vitamin C, 500 mg, Oral, Daily  calcitriol, 0.5 mcg, Oral, Once per day on Mon Wed Fri  cetirizine, 10 mg, Oral,  "Daily  cholecalciferol, 2,000 Units, Oral, Daily  [START ON 4/19/2023] cyanocobalamin, 1,000 mcg, Intramuscular, Q30 Days  famotidine, 10 mg, Oral, Daily  furosemide, 80 mg, Oral, BID  hydrALAZINE, 100 mg, Oral, TID  insulin glargine, 30 Units, Subcutaneous, Nightly  latanoprost, 1 drop, Both Eyes, Nightly  metoclopramide, 10 mg, Oral, BID  metoprolol tartrate, 25 mg, Oral, BID  multivitamin with minerals, 1 tablet, Oral, Daily  sodium chloride, 10 mL, Intravenous, Q12H  terazosin, 10 mg, Oral, Q12H      Continuous Infusions:   PRN Meds:•  HYDROcodone-acetaminophen  •  melatonin  •  nitroglycerin  •  ondansetron **OR** ondansetron  •  [COMPLETED] Insert Peripheral IV **AND** sodium chloride  •  sodium chloride  •  sodium chloride      OBJECTIVE    Vital Signs  Vitals:    03/27/23 0421 03/27/23 0540 03/27/23 0900 03/27/23 1301   BP: 117/44  156/61 170/66   BP Location: Right arm   Right arm   Patient Position: Lying   Sitting   Pulse: 79   54   Resp: 11  14 11   Temp: 97.4 °F (36.3 °C)   96.6 °F (35.9 °C)   TempSrc: Oral   Oral   SpO2: 95%   90%   Weight:  122 kg (269 lb 6.4 oz)     Height:           Flowsheet Rows    Flowsheet Row First Filed Value   Admission Height 182.9 cm (72\") Documented at 03/26/2023 1149   Admission Weight 127 kg (280 lb 13.9 oz) Documented at 03/26/2023 1149            Intake/Output Summary (Last 24 hours) at 3/27/2023 1359  Last data filed at 3/27/2023 0900  Gross per 24 hour   Intake 240 ml   Output 3600 ml   Net -3360 ml        Telemetry: Normal sinus rhythm    Physical Exam:  The patient is alert, oriented and in no distress.  Vital signs as noted above.  Head and neck revealed no carotid bruits or jugular venous distention.  No thyromegaly or lymph adenopathy is present tunneled dialysis catheter in place.  Lungs clear.  No wheezing.  Breath sounds are normal bilaterally.  Heart normal first and second heart sounds. No murmur.  No precordial rub is present.  No gallop is " present.  Abdomen soft and nontender.  No organomegaly is present.  Extremities with good peripheral pulses without any pedal edema.  Skin warm and dry.  Musculoskeletal system is grossly normal.  CNS grossly normal.       Results Review:  I have personally reviewed the results from the time of this admission to 3/27/2023 13:59 EDT and agree with these findings:  [x]  Laboratory  []  Microbiology  [x]  Radiology  [x]  EKG/Telemetry   [x]  Cardiology/Vascular   []  Pathology  []  Old records  []  Other:    Most notable findings include:     Lab Results (last 24 hours)     Procedure Component Value Units Date/Time    Basic Metabolic Panel [[id number]]  (Abnormal) Collected: 03/27/23 1239    Specimen: Blood Updated: 03/27/23 1346     Glucose 358 mg/dL      BUN 36 mg/dL      Creatinine 3.26 mg/dL      Sodium 135 mmol/L      Potassium 4.9 mmol/L      Chloride 99 mmol/L      CO2 24.0 mmol/L      Calcium 8.9 mg/dL      BUN/Creatinine Ratio 11.0     Anion Gap 12.0 mmol/L      eGFR 19.5 mL/min/1.73     Narrative:      GFR Normal >60  Chronic Kidney Disease <60  Kidney Failure <15    The GFR formula is only valid for adults with stable renal function between ages 18 and 70.    Magnesium [[id number]]  (Normal) Collected: 03/27/23 1239    Specimen: Blood Updated: 03/27/23 1346     Magnesium 1.7 mg/dL     Phosphorus [[id number]]  (Abnormal) Collected: 03/27/23 1239    Specimen: Blood Updated: 03/27/23 1346     Phosphorus 4.8 mg/dL     Lipid Panel [[id number]]  (Abnormal) Collected: 03/27/23 1239    Specimen: Blood Updated: 03/27/23 1346     Total Cholesterol 131 mg/dL      Triglycerides 125 mg/dL      HDL Cholesterol 39 mg/dL      LDL Cholesterol  70 mg/dL      VLDL Cholesterol 22 mg/dL      LDL/HDL Ratio 1.72    Narrative:      Cholesterol Reference Ranges  (U.S. Department of Health and Human Services ATP III Classifications)    Desirable          <200 mg/dL  Borderline High    200-239 mg/dL  High Risk          >240  mg/dL      Triglyceride Reference Ranges  (U.S. Department of Health and Human Services ATP III Classifications)    Normal           <150 mg/dL  Borderline High  150-199 mg/dL  High             200-499 mg/dL  Very High        >500 mg/dL    HDL Reference Ranges  (U.S. Department of Health and Human Services ATP III Classifications)    Low     <40 mg/dl (major risk factor for CHD)  High    >60 mg/dl ('negative' risk factor for CHD)        LDL Reference Ranges  (U.S. Department of Health and Human Services ATP III Classifications)    Optimal          <100 mg/dL  Near Optimal     100-129 mg/dL  Borderline High  130-159 mg/dL  High             160-189 mg/dL  Very High        >189 mg/dL    Extra Tubes [[id number]] Collected: 03/27/23 1239    Specimen: Blood, Venous Line Updated: 03/27/23 1345    Narrative:      The following orders were created for panel order Extra Tubes.  Procedure                               Abnormality         Status                     ---------                               -----------         ------                     Lavender Top[[id number]]                                     Final result                 Please view results for these tests on the individual orders.    Lavender Top [[id number]] Collected: 03/27/23 1239    Specimen: Blood Updated: 03/27/23 1345     Extra Tube hold for add-on     Comment: Auto resulted       Procalcitonin [[id number]]  (Normal) Collected: 03/27/23 1239    Specimen: Blood Updated: 03/27/23 1345     Procalcitonin 0.24 ng/mL     Narrative:      As a Marker for Sepsis (Non-Neonates):    1. <0.5 ng/mL represents a low risk of severe sepsis and/or septic shock.  2. >2 ng/mL represents a high risk of severe sepsis and/or septic shock.    As a Marker for Lower Respiratory Tract Infections that require antibiotic therapy:    PCT on Admission    Antibiotic Therapy       6-12 Hrs later    >0.5                Strongly Recommended  >0.25 - <0.5        Recommended   0.1 - 0.25      "     Discouraged              Remeasure/reassess PCT  <0.1                Strongly Discouraged     Remeasure/reassess PCT    As 28 day mortality risk marker: \"Change in Procalcitonin Result\" (>80% or <=80%) if Day 0 (or Day 1) and Day 4 values are available. Refer to http://www.[facility]-pct-calculator.com    Change in PCT <=80%  A decrease of PCT levels below or equal to 80% defines a positive change in PCT test result representing a higher risk for 28-day all-cause mortality of patients diagnosed with severe sepsis for septic shock.    Change in PCT >80%  A decrease of PCT levels of more than 80% defines a negative change in PCT result representing a lower risk for 28-day all-cause mortality of patients diagnosed with severe sepsis or septic shock.       POC Glucose Once [[id number]]  (Abnormal) Collected: 03/27/23 0049    Specimen: Blood Updated: 03/27/23 0051     Glucose 171 mg/dL      Comment: Serial Number: [id number]:  [id number]       Magnesium [[id number]]  (Abnormal) Collected: 03/26/23 1722    Specimen: Blood Updated: 03/26/23 1928     Magnesium 1.5 mg/dL     Basic Metabolic Panel [[id number]]  (Abnormal) Collected: 03/26/23 1722    Specimen: Blood Updated: 03/26/23 1909     Glucose 239 mg/dL      BUN 61 mg/dL      Creatinine 4.53 mg/dL      Sodium 139 mmol/L      Potassium 5.1 mmol/L      Comment: Slight hemolysis detected by analyzer. Results may be affected.        Chloride 102 mmol/L      CO2 19.0 mmol/L      Calcium 8.7 mg/dL      BUN/Creatinine Ratio 13.5     Anion Gap 18.0 mmol/L      eGFR 13.1 mL/min/1.73      Comment: <15 Indicative of kidney failure       Narrative:      GFR Normal >60  Chronic Kidney Disease <60  Kidney Failure <15    The GFR formula is only valid for adults with stable renal function between ages 18 and 70.    Phosphorus [[id number]]  (Abnormal) Collected: 03/26/23 1722    Specimen: Blood Updated: 03/26/23 1909     Phosphorus 5.2 mg/dL     Single High Sensitivity Troponin T " [[id number]]  (Abnormal) Collected: 03/26/23 1412    Specimen: Blood Updated: 03/26/23 1445     HS Troponin T 117 ng/L     Narrative:      High Sensitive Troponin T Reference Range:  <10.0 ng/L- Negative Female for AMI  <15.0 ng/L- Negative Male for AMI  >=10 - Abnormal Female indicating possible myocardial injury.  >=15 - Abnormal Male indicating possible myocardial injury.   Clinicians would have to utilize clinical acumen, EKG, Troponin, and serial changes to determine if it is an Acute Myocardial Infarction or myocardial injury due to an underlying chronic condition.         Extra Tubes [[id number]] Collected: 03/26/23 1324    Specimen: Blood, Venous Line Updated: 03/26/23 1430    Narrative:      The following orders were created for panel order Extra Tubes.  Procedure                               Abnormality         Status                     ---------                               -----------         ------                     Green Top (No Gel)[[id number]]                               Final result                 Please view results for these tests on the individual orders.    Green Top (No Gel) [[id number]] Collected: 03/26/23 1324    Specimen: Blood Updated: 03/26/23 1430     Extra Tube Hold for add-ons.     Comment: Auto resulted.       CBC & Differential [[id number]]  (Abnormal) Collected: 03/26/23 1324    Specimen: Blood Updated: 03/26/23 1403    Narrative:      The following orders were created for panel order CBC & Differential.  Procedure                               Abnormality         Status                     ---------                               -----------         ------                     CBC Auto Differential[[id number]]        Abnormal            Final result               Scan Slide[[id number]]                                       Final result                 Please view results for these tests on the individual orders.    Scan Slide [[id number]] Collected: 03/26/23 1324    Specimen: Blood  Updated: 03/26/23 1403     Scan Slide --     Comment: See Manual Differential Results       Manual Differential [[id number]]  (Abnormal) Collected: 03/26/23 1324    Specimen: Blood Updated: 03/26/23 1403     Neutrophil % 77.0 %      Lymphocyte % 9.0 %      Monocyte % 8.0 %      Eosinophil % 2.0 %      Basophil % 1.0 %      Myelocyte % 3.0 %      Neutrophils Absolute 9.70 10*3/mm3      Lymphocytes Absolute 1.13 10*3/mm3      Monocytes Absolute 1.01 10*3/mm3      Eosinophils Absolute 0.25 10*3/mm3      Basophils Absolute 0.13 10*3/mm3      RBC Morphology Normal     WBC Morphology Normal     Platelet Morphology Normal    CBC Auto Differential [[id number]]  (Abnormal) Collected: 03/26/23 1324    Specimen: Blood Updated: 03/26/23 1403     WBC 12.60 10*3/mm3      RBC 2.92 10*6/mm3      Hemoglobin 8.5 g/dL      Hematocrit 27.7 %      MCV 94.8 fL      MCH 29.0 pg      MCHC 30.6 g/dL      RDW 14.6 %      RDW-SD 51.6 fl      MPV 10.5 fL      Platelets 149 10*3/mm3     Narrative:      The previously reported component NRBC is no longer being reported. Previous result was 0.6 /100 WBC (Reference Range: 0.0-0.2 /100 WBC) on 3/26/2023 at 1330 EDT.          Imaging Results (Last 24 Hours)     ** No results found for the last 24 hours. **          LAB RESULTS (LAST 7 DAYS)    CBC  Results from last 7 days   Lab Units 03/26/23  1324 03/24/23  0750 03/23/23  0004 03/22/23  0909 03/21/23  0504   WBC 10*3/mm3 12.60* 7.40 10.60 9.80 8.70   RBC 10*6/mm3 2.92* 2.70* 2.62* 2.73* 2.72*   HEMOGLOBIN g/dL 8.5* 8.1* 7.9* 8.0* 8.0*   HEMATOCRIT % 27.7* 25.8* 24.2* 25.4* 25.6*   MCV fL 94.8 95.6 92.3 93.3 94.1   PLATELETS 10*3/mm3 149 120* 127* 126* 131*       BMP  Results from last 7 days   Lab Units 03/27/23  1239 03/26/23  1722 03/26/23  1241 03/24/23  0750 03/23/23  1528 03/23/23  0004 03/22/23  0430 03/21/23  0504   SODIUM mmol/L 135* 139 137 135*  --  138 135* 140   POTASSIUM mmol/L 4.9 5.1 5.1 4.9  --  4.8 4.6 5.0   CHLORIDE mmol/L 99 102  100 100  --  101 99 105   CO2 mmol/L 24.0 19.0* 23.0 24.0  --  24.0 21.0* 17.0*   BUN mg/dL 36* 61* 62* 45*  --  67* 63* 90*   CREATININE mg/dL 3.26* 4.53* 4.77* 3.98*  --  5.27* 5.01* 6.41*   GLUCOSE mg/dL 358* 239* 382* 230*  --  113* 68 136*   MAGNESIUM mg/dL 1.7 1.5*  --  2.3 1.7 1.5* 1.6 1.7   PHOSPHORUS mg/dL 4.8* 5.2*  --  4.3  --  5.6* 6.6* 9.5*       CMP   Results from last 7 days   Lab Units 03/27/23  1239 03/26/23  1722 03/26/23  1241 03/24/23  0750 03/23/23  0004 03/22/23  0430 03/21/23  0504   SODIUM mmol/L 135* 139 137 135* 138 135* 140   POTASSIUM mmol/L 4.9 5.1 5.1 4.9 4.8 4.6 5.0   CHLORIDE mmol/L 99 102 100 100 101 99 105   CO2 mmol/L 24.0 19.0* 23.0 24.0 24.0 21.0* 17.0*   BUN mg/dL 36* 61* 62* 45* 67* 63* 90*   CREATININE mg/dL 3.26* 4.53* 4.77* 3.98* 5.27* 5.01* 6.41*   GLUCOSE mg/dL 358* 239* 382* 230* 113* 68 136*   ALBUMIN g/dL  --   --  3.8 3.7 3.6 3.8 3.6   BILIRUBIN mg/dL  --   --  0.4 0.3 0.2 0.3 0.3   ALK PHOS U/L  --   --  114 130* 97 100 95   AST (SGOT) U/L  --   --  17 35 20 21 16   ALT (SGPT) U/L  --   --  17 18 8 12 12       BNP        TROPONIN  Results from last 7 days   Lab Units 03/26/23  1412   HSTROP T ng/L 117*       CoAg        Creatinine Clearance  Estimated Creatinine Clearance: 28 mL/min (A) (by C-G formula based on SCr of 3.26 mg/dL (H)).    ABG          Radiology  XR Chest 1 View    Result Date: 3/26/2023  Impression: 1. Worsening interstitial pulmonary edema pattern likely representing worsening volume overload. 2. Right-sided tunneled dialysis catheter with tip terminating in the right atrium. Electronically Signed: Alvin Duran  3/26/2023 1:31 PM EDT  Workstation ID: [id number]        EKG  I personally viewed and interpreted the patient's EKG/Telemetry data:  ECG 12 Lead Dyspnea   Preliminary Result   HEART RATE= 92  bpm   RR Interval= 652  ms   NC Interval= 221  ms   P Horizontal Axis= 15  deg   P Front Axis= 75  deg   QRSD Interval= 113  ms   QT Interval= 384  ms    QRS Axis= 113  deg   T Wave Axis= 44  deg   - ABNORMAL ECG -   Sinus rhythm   Prolonged ME interval   Electronically Signed By:    Date and Time of Study: 2023-03-26 11:59:17      SCANNED - TELEMETRY     Final Result      SCANNED - TELEMETRY     Final Result      SCANNED - TELEMETRY     Final Result            Echocardiogram:    Results for orders placed during the hospital encounter of 03/20/23    Adult Transthoracic Echo Complete w/ Color, Spectral and Contrast if Necessary Per Protocol    Interpretation Summary  •  Left ventricular systolic function is normal. Calculated left ventricular EF = 57%  •  Left ventricular wall thickness is consistent with mild concentric hypertrophy.  •  The right atrial cavity is mild to moderately  dilated.  •  There is calcification of the aortic valve.  •  Abnormal mitral valve structure consistent with dilated annulus.  •  There is a trivial pericardial effusion.    Transthoracic echocardiography reveals EF of 57%.  Trivial to small pericardial effusion noted.  Mild MR and mild TR noted.  Moderate left atrial enlargement.    Electronically signed by Tramaine Pérez MD, 03/22/23, 12:08 PM EDT.        Stress Test:        Cardiac Catheterization:  No results found for this or any previous visit.        Other:      ASSESSMENT & PLAN:    Principal Problem:    Hypervolemia, unspecified hypervolemia type    Elevated troponin  In the setting of end-stage renal disease and volume overload  No chest pain  Appears to be myocardial injury secondary to above  Recent negative stress test  No further work-up at this time    Volume overload  Secondary to diet noncompliance  Status post urgent dialysis with improvement in volume status  Started on Lasix 80 mg twice daily  Continues to have urine output  Recent echocardiogram results noted    End-stage renal disease  Currently on dialysis Monday Wednesday and Friday via tunneled dialysis catheter  Followed by nephrology and plans for  outpatient AV fistula mapping    Hypertension  Was very hypertensive on admission  Continue with hydralazine and amlodipine  Switch metoprolol to Coreg  Follow-up outpatient      Okay to discharge from cardiac standpoint      Deepti Culver MD  03/27/23  13:59 EDT

## 2023-03-27 NOTE — OUTREACH NOTE
Medical Week 1 Survey    Flowsheet Row Responses   [facility] facility patient discharged from? [facility]   Does the patient have one of the following disease processes/diagnoses(primary or secondary)? Other   Week 1 attempt successful? No   Unsuccessful attempts Attempt 1   Revoke Readmitted          Yasmeen STRANGE - Registered Nurse

## 2023-03-27 NOTE — NURSING NOTE
HD tolerated fairly well.  3.5L net UF as pt tolerated.  VSS; Pre-/81, Post-/88.  Line care per protocol.  Report given to primary RN.

## 2023-03-27 NOTE — PLAN OF CARE
Problem: Adult Inpatient Plan of Care  Goal: Absence of Hospital-Acquired Illness or Injury  Intervention: Identify and Manage Fall Risk  Recent Flowsheet Documentation  Taken 3/27/2023 0200 by Kathryn Houser LPN  Safety Promotion/Fall Prevention:   activity supervised   assistive device/personal items within reach   clutter free environment maintained   lighting adjusted   muscle strengthening facilitated   nonskid shoes/slippers when out of bed   room organization consistent   safety round/check completed  Intervention: Prevent Skin Injury  Recent Flowsheet Documentation  Taken 3/27/2023 0200 by Kathryn Houser LPN  Body Position:   position changed independently   weight shifting  Skin Protection:   adhesive use limited   transparent dressing maintained   tubing/devices free from skin contact  Intervention: Prevent and Manage VTE (Venous Thromboembolism) Risk  Recent Flowsheet Documentation  Taken 3/27/2023 0200 by Kathryn Houser LPN  Activity Management: activity adjusted per tolerance  Intervention: Prevent Infection  Recent Flowsheet Documentation  Taken 3/27/2023 0200 by Kathryn Houser LPN  Infection Prevention:   environmental surveillance performed   equipment surfaces disinfected   hand hygiene promoted   rest/sleep promoted   single patient room provided     Problem: Fall Injury Risk  Goal: Absence of Fall and Fall-Related Injury  Intervention: Identify and Manage Contributors  Recent Flowsheet Documentation  Taken 3/27/2023 0200 by Kathryn Houser LPN  Medication Review/Management: medications reviewed  Intervention: Promote Injury-Free Environment  Recent Flowsheet Documentation  Taken 3/27/2023 0200 by Kathryn Houser LPN  Safety Promotion/Fall Prevention:   activity supervised   assistive device/personal items within reach   clutter free environment maintained   lighting adjusted   muscle strengthening facilitated   nonskid shoes/slippers when out of bed   room organization consistent   safety  round/check completed   Goal Outcome Evaluation:      Patient is a 73 y/o male admitted for increase in general weakness along with hypoxemia and hypervolemia. Patient received STAT hemodialysis and 3.5 L was removed as dialysis nurse reported to me that the patient began to experience cramps when attempting to remove anymore. Patient is currently on 4L of O2 per nasal cannula and states they do not wear O2 at home. Patient is alert and oriented x4, but stated they feel very fatigued after dialysis. Upon returning to unit after dialysis checked patient's vitals, and patient's BP was highly elevated with lowest reading being 184/79; however, patient is currently diagnosed with hypertension and missed doses of hydralizine and metoprolol due to dialysis, so as soon as they were returned to unit I administered needed doses of these medications. After allowing time for blood pressure medication to take affect, patient's blood pressure is now 117/44. Will continue to monitor patient and their well being during current shift.

## 2023-03-27 NOTE — CONSULTS
RENAL/KCC:    Referring Provider: Dr. Sumner  Reason for Consultation: ESRD, Fluid overload    Subjective     Chief complaint: SOA    History of present illness: Patient is a 71 yo WM with h/o ESRD who recently initiated dialysis.  He presents with SOA and Pulmonary edema.  He had not been compliant with low salt diet and fluid restriction after recent D/C.  He is s/p acute HD overnight with 3.5L UF.  He has not been on Lasix outpatient.  He states his UOP is decent.  No other complaints.  He is feeling much better this AM.      History  Past Medical History:   Diagnosis Date   • Diabetes mellitus (HCC)    • Hyperlipidemia    • Hypertension    • Sleep apnea    ,   Past Surgical History:   Procedure Laterality Date   • HERNIA REPAIR     • KNEE ARTHROSCOPY Left    • SPLENECTOMY     • TONSILLECTOMY     • VITRECTOMY PARS PLANA Right 10/22/2019    Procedure: 25G VITRECTOMY-ENDOLASER;  Surgeon: Lazarus, Howard S., MD;  Location: [facility] OR;  Service: Ophthalmology   , History reviewed. No pertinent family history.,   Social History     Socioeconomic History   • Marital status:    Tobacco Use   • Smoking status: Former     Packs/day: 1.00     Years: 49.00     Pack years: 49.00     Types: Cigarettes     Quit date: 2018     Years since quittin.3   Vaping Use   • Vaping Use: Never used   Substance and Sexual Activity   • Alcohol use: Not Currently   • Drug use: Yes     Frequency: 5.0 times per week     Types: Marijuana     Comment: occasional gummy   • Sexual activity: Defer     E-cigarette/Vaping   • E-cigarette/Vaping Use Never User      E-cigarette/Vaping Substances     E-cigarette/Vaping Devices       ,   Medications Prior to Admission   Medication Sig Dispense Refill Last Dose   • amLODIPine (NORVASC) 10 MG tablet Take 1 tablet by mouth Daily.   3/25/2023   • Cholecalciferol (VITAMIN D3) 2000 units tablet Take 1 tablet by mouth Daily.   3/25/2023   • doxazosin (CARDURA) 8 MG tablet Take 1  tablet by mouth 2 (Two) Times a Day.   3/25/2023   • hydrALAZINE (APRESOLINE) 100 MG tablet Take 1 tablet by mouth 3 (Three) Times a Day.   3/25/2023   • ibandronate (BONIVA) 150 MG tablet Take 1 tablet by mouth Every 30 (Thirty) Days.   3/1/2023   • Insulin Glargine, 2 Unit Dial, (Toujeo Max SoloStar) 300 UNIT/ML solution pen-injector injection Inject 20 Units under the skin into the appropriate area as directed Daily for 30 days. 2 mL 0 3/25/2023   • levocetirizine (XYZAL) 5 MG tablet Take 1 tablet by mouth Every Evening.   3/25/2023   • LUMIGAN 0.01 % ophthalmic drops Administer 1 drop to both eyes Every Night. Instill 1 drop into both eyes daily at bedtime  2 3/25/2023   • metoclopramide (REGLAN) 10 MG tablet Take 1 tablet by mouth 2 (Two) Times a Day. Morning and lunch   3/25/2023   • metoprolol tartrate (LOPRESSOR) 25 MG tablet Take 1 tablet by mouth 2 (Two) Times a Day.   3/25/2023   • Multiple Vitamins-Minerals (GWENDOLYN MULTIVITAMIN FOR MEN) tablet Take 1 tablet by mouth Daily. Take one daily   3/25/2023   • tamsulosin (FLOMAX) 0.4 MG capsule 24 hr capsule Take 2 capsules by mouth every night at bedtime.   3/25/2023   • vitamin C (ASCORBIC ACID) 500 MG tablet Take 1 tablet by mouth Daily.   3/25/2023   • calcitriol (ROCALTROL) 0.5 MCG capsule Take 1 capsule by mouth 3 (Three) Times a Week. Monday, Wednesday and Friday   3/24/2023   • cyanocobalamin 1000 MCG/ML injection Inject 1 mL into the appropriate muscle as directed by prescriber Every 30 (Thirty) Days.   3/19/2023   , Scheduled Meds:  amLODIPine, 10 mg, Oral, Daily  vitamin C, 500 mg, Oral, Daily  calcitriol, 0.5 mcg, Oral, Once per day on Mon Wed Fri  cetirizine, 10 mg, Oral, Daily  cholecalciferol, 2,000 Units, Oral, Daily  [START ON 4/19/2023] cyanocobalamin, 1,000 mcg, Intramuscular, Q30 Days  famotidine, 10 mg, Oral, Daily  furosemide, 80 mg, Oral, BID  hydrALAZINE, 100 mg, Oral, TID  insulin glargine, 30 Units, Subcutaneous, Nightly  latanoprost, 1  drop, Both Eyes, Nightly  metoclopramide, 10 mg, Oral, BID  metoprolol tartrate, 25 mg, Oral, BID  multivitamin with minerals, 1 tablet, Oral, Daily  sodium chloride, 10 mL, Intravenous, Q12H  terazosin, 10 mg, Oral, Q12H    , Continuous Infusions:   , PRN Meds:  •  HYDROcodone-acetaminophen  •  melatonin  •  nitroglycerin  •  ondansetron **OR** ondansetron  •  [COMPLETED] Insert Peripheral IV **AND** sodium chloride  •  sodium chloride  •  sodium chloride and Allergies:  Patient has no known allergies.    Review of Systems  Pertinent items are noted in HPI    Objective     Vital Signs  Temp:  [97.4 °F (36.3 °C)-98 °F (36.7 °C)] 97.4 °F (36.3 °C)  Heart Rate:  [79-99] 79  Resp:  [11-22] 11  BP: (117-200)/(44-94) 117/44    No intake/output data recorded.  I/O last 3 completed shifts:  In: -   Out: 3600 [Urine:100; Other:3500]    Physical Exam:  GEN: Awake, NAD  ENT: PERRL, EOMI, MMM  NECK: Supple, no JVD  CHEST: CTAB, no W/R/C  CV: RRR, no M/G/R  ABD: Soft, NT, +BS  SKIN: Warm and Dry  NEURO: CN's intact      Results Review:   I reviewed the patient's new clinical results.    WBC WBC   Date Value Ref Range Status   03/26/2023 12.60 (H) 3.40 - 10.80 10*3/mm3 Final      HGB Hemoglobin   Date Value Ref Range Status   03/26/2023 8.5 (L) 13.0 - 17.7 g/dL Final      HCT Hematocrit   Date Value Ref Range Status   03/26/2023 27.7 (L) 37.5 - 51.0 % Final      Platlets No results found for: LABPLAT   MCV MCV   Date Value Ref Range Status   03/26/2023 94.8 79.0 - 97.0 fL Final          Sodium Sodium   Date Value Ref Range Status   03/26/2023 139 136 - 145 mmol/L Final   03/26/2023 137 136 - 145 mmol/L Final      Potassium Potassium   Date Value Ref Range Status   03/26/2023 5.1 3.5 - 5.2 mmol/L Final     Comment:     Slight hemolysis detected by analyzer. Results may be affected.   03/26/2023 5.1 3.5 - 5.2 mmol/L Final     Comment:     Slight hemolysis detected by analyzer. Results may be affected.      Chloride Chloride   Date  Value Ref Range Status   03/26/2023 102 98 - 107 mmol/L Final   03/26/2023 100 98 - 107 mmol/L Final      CO2 CO2   Date Value Ref Range Status   03/26/2023 19.0 (L) 22.0 - 29.0 mmol/L Final   03/26/2023 23.0 22.0 - 29.0 mmol/L Final      BUN BUN   Date Value Ref Range Status   03/26/2023 61 (H) 8 - 23 mg/dL Final   03/26/2023 62 (H) 8 - 23 mg/dL Final      Creatinine Creatinine   Date Value Ref Range Status   03/26/2023 4.53 (H) 0.76 - 1.27 mg/dL Final   03/26/2023 4.77 (H) 0.76 - 1.27 mg/dL Final      Calcium Calcium   Date Value Ref Range Status   03/26/2023 8.7 8.6 - 10.5 mg/dL Final   03/26/2023 8.7 8.6 - 10.5 mg/dL Final      PO4 No results found for: CAPO4   Albumin Albumin   Date Value Ref Range Status   03/26/2023 3.8 3.5 - 5.2 g/dL Final      Magnesium Magnesium   Date Value Ref Range Status   03/26/2023 1.5 (L) 1.6 - 2.4 mg/dL Final      Uric Acid No results found for: URICACID         amLODIPine, 10 mg, Oral, Daily  vitamin C, 500 mg, Oral, Daily  calcitriol, 0.5 mcg, Oral, Once per day on Mon Wed Fri  cetirizine, 10 mg, Oral, Daily  cholecalciferol, 2,000 Units, Oral, Daily  [START ON 4/19/2023] cyanocobalamin, 1,000 mcg, Intramuscular, Q30 Days  famotidine, 10 mg, Oral, Daily  furosemide, 80 mg, Oral, BID  hydrALAZINE, 100 mg, Oral, TID  insulin glargine, 30 Units, Subcutaneous, Nightly  latanoprost, 1 drop, Both Eyes, Nightly  metoclopramide, 10 mg, Oral, BID  metoprolol tartrate, 25 mg, Oral, BID  multivitamin with minerals, 1 tablet, Oral, Daily  sodium chloride, 10 mL, Intravenous, Q12H  terazosin, 10 mg, Oral, Q12H           Assessment & Plan       Hypervolemia, unspecified hypervolemia type    ESRD    HTN    Anenia of CKD    PLAN: S/P acute HD.  Feeling much better.  Off O2 this AM.  BP improved.  OK to resume oral Lasix - will add 80 mg BID.  Discussed fluid restriction and low-salt diet.  OK for D/C from a renal standpoint.        Jame Alaniz MD   [facility]  03/27/23  @NOW

## 2023-03-27 NOTE — CASE MANAGEMENT/SOCIAL WORK
Case Management Discharge Note      Final Note: Home         Selected Continued Care - Discharged on 3/25/2023 Admission date: 3/20/2023 - Discharge disposition: Home or Self Care            Transportation Services  Private: Car    Final Discharge Disposition Code: 01 - home or self-care

## 2023-03-27 NOTE — DISCHARGE SUMMARY
[facility] Hospital Medicine Services  DISCHARGE SUMMARY    Patient Name: Gerry Torres  : 1950  MRN: [id number]    Date of Admission: 3/26/2023  Discharge Diagnosis: ESRD on HD  Date of Discharge: 3/27/23  Primary Care Physician: Mary Le MD    Presenting Problem:   Hypervolemia, unspecified hypervolemia type [E87.70]  Acute hypoxemic respiratory failure (HCC) [J96.01]    Active and Resolved Hospital Problems:  Active Hospital Problems    Diagnosis POA   • **Hypervolemia, unspecified hypervolemia type [E87.70] Yes      Resolved Hospital Problems   No resolved problems to display.     ESRD on HD  -Nephology consult  -s/p Tunnel cath placement 3/20/2023 for HD-- plan for fistula later this month  -follow K+  -renal diet      Shortness of breath, elevated BNP/elevated troponin, lower extremity edema  -Elevated troponin likely due to new onset ESRD  -Normal LV function and RV function on echocardiogram  -EKG without acute ST changes  -Stress test from [facility] 2023 negative for ischemia     Hypotension, history HTN  -Blood pressure trending up  -Coreg added.     Back pain, CVA tenderness  -UA culture negative  -Back pain resolving     Normocytic anemia of CKD  -epogen per renal     Thrombocytopenia  -Probably reactive, continue to trend     DM 1, hypoglycemic overnight   -10 units of insulin started  monitor closely.      Pancreatic cancer, renal mass s/p whipple procedure/splenectomy partial nephrectomy      GREGG  -Home CPAP  - O2 hs while in hospital     Hypomagnesium  -Recheck serum level after dialysis     -Initiate PT/OT for discharge assessment       Hospital Course     History of Present Illness: Gerry Torres is a 72 y.o. male with past medical history significant for diabetes, hypertension, hyperlipidemia  who presented to [facility] on 3/26/2023 complaining of patient is c/o generalized weakness, feeling tired, shortness of  breath with exertion.   he recently started dialysis during hospital stay, Patient was discharged from the hospital yesterday afternoon.  Was feeling great.  He and his wife went to a hibachi [place].  Feeling good when he went to bed last night.  When he woke up this morning he was very tired.  Felt weak.  Short of breath with exertion.  States he does not feel short of breath when he lays flat.  Stated swelling may be mildly worse but much better than when he came to the hospital last week.  Did not eat well this morning.  Denies any vomiting or diarrhea.  Denies any chest pain or abdominal pain.  Of note patient was 82% on room air in ER.     XR Chest-Result Date: 3/26/2023-Impression: 1. Worsening interstitial pulmonary edema pattern likely representing worsening volume overload. 2. Right-sided tunneled dialysis catheter with tip terminating in the right atrium. Electronically Signed: Alvin Duran           EKG # 1-Signed and interpreted by the EP.Time Interpreted: 11:59 AM=Rate: 92, Rhythm: Normal sinus rhythm. Axis: Right axis deviation, Intervals: First-degree AV block, prolonged QRS,ST Segments: No acute ischemic changes.  Comparison: No significant change from March 20,023 EKG.  Reviewed nephrology note from Dr. Garrett Vasquez from March 25, 2023.  Patient tolerating dialysis well.  Down 20 pounds since admission.  Patient to continue Monday Wednesday Friday dialysis.  Continuing Epogen.  Has appointment later this month for AV fistula mapping.     3/27/2023 patient seen and examined medical distress, doing better today, anxious to go home.  Discussed with RN.  Cardiology cleared the patient for discharge.  Condition on discharge stable.      DISCHARGE Follow Up Recommendations for labs and diagnostics: Follow-up with PCP in a week.    Reasons For Change In Medications and Indications for New Medications: Coreg started.  Metoprolol discontinued.    Day of Discharge     Vital Signs:  Temp:  [97.4 °F (36.3  °C)-98 °F (36.7 °C)] 97.4 °F (36.3 °C)  Heart Rate:  [79-99] 79  Resp:  [11-18] 14  BP: (117-200)/(44-94) 156/61  Flow (L/min):  [2-4] 2      Physical Exam Constitutional:       General: He is not in acute distress.     Appearance: He is well-developed. He is obese. He is not ill-appearing, toxic-appearing or diaphoretic.   HENT:      Head: Normocephalic and atraumatic.      Nose: Nose normal. No congestion or rhinorrhea.      Mouth/Throat:      Mouth: Mucous membranes are moist.      Pharynx: No oropharyngeal exudate.   Eyes:      General: No scleral icterus.        Right eye: No discharge.         Left eye: No discharge.      Extraocular Movements: Extraocular movements intact.      Pupils: Pupils are equal, round, and reactive to light.   Neck:      Thyroid: No thyromegaly.      Vascular: No carotid bruit or JVD.      Trachea: No tracheal deviation.   Cardiovascular:      Rate and Rhythm: Normal rate and regular rhythm.      Pulses: Normal pulses.      Heart sounds: Normal heart sounds. No murmur heard.    No friction rub. No gallop.   Pulmonary:      Effort: Pulmonary effort is normal. No respiratory distress.      Breath sounds: No wheezing or rales.      Comments: Decreased   Abdominal:      General: Bowel sounds are normal. There is no distension.      Palpations: Abdomen is soft.      Tenderness: There is no abdominal tenderness.   Musculoskeletal:         General: Swelling present. No tenderness, deformity or signs of injury. Normal range of motion.      Cervical back: Normal range of motion and neck supple. No rigidity. No muscular tenderness.      Right lower leg: Edema present.      Left lower leg: Edema present.   Lymphadenopathy:      Cervical: No cervical adenopathy.   Skin:     General: Skin is warm and dry.      Coloration: Skin is not jaundiced or pale.      Findings: No bruising, erythema or rash.   Neurological:      General: No focal deficit present.      Mental Status: He is alert and oriented  to person, place, and time. Mental status is at baseline.      Cranial Nerves: No cranial nerve deficit.      Sensory: No sensory deficit.      Motor: Weakness present. No abnormal muscle tone.      Coordination: Coordination normal.   Psychiatric:         Mood and Affect: Mood normal.         Behavior: Behavior normal.         Thought Content: Thought content normal.         Judgment: Judgment normal.       Pertinent  and/or Most Recent Results     LAB RESULTS:      Lab 03/26/23  1324 03/24/23  0750 03/23/23  0004 03/22/23  0909 03/21/23  0504   WBC 12.60* 7.40 10.60 9.80 8.70   HEMOGLOBIN 8.5* 8.1* 7.9* 8.0* 8.0*   HEMATOCRIT 27.7* 25.8* 24.2* 25.4* 25.6*   PLATELETS 149 120* 127* 126* 131*   NEUTROS ABS 9.70* 4.51 6.80 7.00 6.40   LYMPHS ABS  --   --  1.50 1.10 0.90   MONOS ABS  --   --  1.80* 1.50* 1.10*   EOS ABS 0.25 0.52* 0.30 0.20 0.10   MCV 94.8 95.6 92.3 93.3 94.1         Lab 03/26/23  1722 03/26/23  1241 03/24/23  0750 03/23/23  1528 03/23/23  0004 03/22/23  0909 03/22/23  0430 03/21/23  0504   SODIUM 139 137 135*  --  138  --  135* 140   POTASSIUM 5.1 5.1 4.9  --  4.8  --  4.6 5.0   CHLORIDE 102 100 100  --  101  --  99 105   CO2 19.0* 23.0 24.0  --  24.0  --  21.0* 17.0*   ANION GAP 18.0* 14.0 11.0  --  13.0  --  15.0 18.0*   BUN 61* 62* 45*  --  67*  --  63* 90*   CREATININE 4.53* 4.77* 3.98*  --  5.27*  --  5.01* 6.41*   EGFR 13.1* 12.3* 15.3*  --  11.0*  --  11.6* 8.7*   GLUCOSE 239* 382* 230*  --  113*  --  68 136*   CALCIUM 8.7 8.7 7.6*  --  7.8*  --  7.8* 7.7*   MAGNESIUM 1.5*  --  2.3 1.7 1.5*  --  1.6 1.7   PHOSPHORUS 5.2*  --  4.3  --  5.6*  --  6.6* 9.5*   HEMOGLOBIN A1C  --   --   --   --   --  6.40*  --  6.90*         Lab 03/26/23  1241 03/24/23  0750 03/23/23  0004 03/22/23  0430 03/21/23  0504   TOTAL PROTEIN 6.3 6.1 5.8* 6.3 5.8*   ALBUMIN 3.8 3.7 3.6 3.8 3.6   GLOBULIN 2.5 2.4 2.2 2.5 2.2   ALT (SGPT) 17 18 8 12 12   AST (SGOT) 17 35 20 21 16   BILIRUBIN 0.4 0.3 0.2 0.3 0.3   ALK PHOS  114 130* 97 100 95         Lab 03/26/23  1412 03/26/23  1241 03/21/23  1202 03/21/23  0504   PROBNP  --  20,755.0*  --   --    HSTROP T 117* 117* 96* 85*             Lab 03/21/23  0504   IRON 75   IRON SATURATION 29   TIBC 258*   TRANSFERRIN 173*   FERRITIN 188.00         Brief Urine Lab Results  (Last result in the past 365 days)      Color   Clarity   Blood   Leuk Est   Nitrite   Protein   CREAT   Urine HCG        03/20/23 1233 Yellow   Slightly Cloudy  Comment: Result checked     Negative   Moderate (2+)   Negative   >=300 mg/dL (3+)               Microbiology Results (last 10 days)     Procedure Component Value - Date/Time    Urine Culture - Urine, Urine, Clean Catch [[id number]]  (Normal) Collected: 03/20/23 1233    Lab Status: Final result Specimen: Urine, Clean Catch Updated: 03/21/23 1306     Urine Culture No growth          XR Chest 1 View    Result Date: 3/26/2023  Impression: Impression: 1. Worsening interstitial pulmonary edema pattern likely representing worsening volume overload. 2. Right-sided tunneled dialysis catheter with tip terminating in the right atrium. Electronically Signed: Alvin Duran  3/26/2023 1:31 PM EDT  Workstation ID: [id number]    XR Chest 1 View    Result Date: 3/20/2023  Impression: Impression: 1. Borderline heart size with enlarged main pulmonary artery segments suggesting changes of pulmonary arterial hypertension. 2. No focal airspace consolidation. Electronically Signed: Grover Mccabe  3/20/2023 11:46 AM EDT  Workstation ID: [id number]    IR Insert Tunneled CV Catheter Without Port 5 Plus    Result Date: 3/20/2023  Impression: Impression: Technically successful placement, 15.5 Romansh dual lumen tunneled hemodialysis catheter, utilizing the right internal jugular vein approach, as well as both sonographic and fluoroscopic control. Electronically Signed: Annmarie Madden  3/20/2023 3:35 PM EDT  Workstation ID: [id number]              Results for orders placed during the hospital  encounter of 03/20/23    Adult Transthoracic Echo Complete w/ Color, Spectral and Contrast if Necessary Per Protocol    Interpretation Summary  •  Left ventricular systolic function is normal. Calculated left ventricular EF = 57%  •  Left ventricular wall thickness is consistent with mild concentric hypertrophy.  •  The right atrial cavity is mild to moderately  dilated.  •  There is calcification of the aortic valve.  •  Abnormal mitral valve structure consistent with dilated annulus.  •  There is a trivial pericardial effusion.    Transthoracic echocardiography reveals EF of 57%.  Trivial to small pericardial effusion noted.  Mild MR and mild TR noted.  Moderate left atrial enlargement.    Electronically signed by Tramaine Pérez MD, 03/22/23, 12:08 PM EDT.      Labs Pending at Discharge:  Pending Labs     Order Current Status    Basic Metabolic Panel Collected (03/27/23 1239)    Lipid Panel Collected (03/27/23 1239)    Magnesium Collected (03/27/23 1239)    Phosphorus Collected (03/27/23 1239)    Procalcitonin Collected (03/27/23 1239)          Procedures Performed           Consults:   Consults     Date and Time Order Name Status Description    3/26/2023  3:32 PM Inpatient Cardiology Consult      3/26/2023  1:53 PM Nephrology (on -call MD unless specified) Completed     3/20/2023 12:08 PM Nephrology (on -call MD unless specified) Completed         Assessment & Plan    Hypervolemia, unspecified hypervolemia type    ESRD    HTN    Anenia of CKD     PLAN: S/P acute HD.  Feeling much better.  Off O2 this AM.  BP improved.  OK to resume oral Lasix - will add 80 mg BID.  Discussed fluid restriction and low-salt diet.  OK for D/C from a renal standpoint.       Jame Alaniz MD   [facility]  03/27/23    =======================================================  ASSESSMENT & PLAN:     Principal Problem:    Hypervolemia, unspecified hypervolemia type     Elevated troponin  In the setting of  end-stage renal disease and volume overload  No chest pain  Appears to be myocardial injury secondary to above  Recent negative stress test  No further work-up at this time     Volume overload  Secondary to diet noncompliance  Status post urgent dialysis with improvement in volume status  Started on Lasix 80 mg twice daily  Continues to have urine output  Recent echocardiogram results noted     End-stage renal disease  Currently on dialysis Monday Wednesday and Friday via tunneled dialysis catheter  Followed by nephrology and plans for outpatient AV fistula mapping     Hypertension  Was very hypertensive on admission  Continue with hydralazine and amlodipine  Switch metoprolol to Coreg  Follow-up outpatient     Okay to discharge from cardiac standpoint     Deepti Culver MD  03/27/23    Discharge Details        Discharge Medications      New Medications      Instructions Start Date   furosemide 80 MG tablet  Commonly known as: LASIX   80 mg, Oral, 2 Times Daily         Continue These Medications      Instructions Start Date   amLODIPine 10 MG tablet  Commonly known as: NORVASC   10 mg, Oral, Daily      calcitriol 0.5 MCG capsule  Commonly known as: ROCALTROL   0.5 mcg, Oral, 3 Times Weekly, Monday, Wednesday and Friday      cyanocobalamin 1000 MCG/ML injection   1,000 mcg, Intramuscular, Every 30 Days      doxazosin 8 MG tablet  Commonly known as: CARDURA   8 mg, Oral, 2 Times Daily      hydrALAZINE 100 MG tablet  Commonly known as: APRESOLINE   100 mg, Oral, 3 Times Daily      ibandronate 150 MG tablet  Commonly known as: BONIVA   150 mg, Oral, Every 30 Days      levocetirizine 5 MG tablet  Commonly known as: XYZAL   5 mg, Oral, Every Evening      Lumigan 0.01 % ophthalmic drops  Generic drug: bimatoprost   1 drop, Both Eyes, Nightly, Instill 1 drop into both eyes daily at bedtime      Manjinder Multivitamin for Men tablet tablet  Generic drug: multivitamin with minerals   1 tablet, Oral, Daily, Take one daily       metoclopramide 10 MG tablet  Commonly known as: REGLAN   10 mg, Oral, 2 Times Daily, Morning and lunch      metoprolol tartrate 25 MG tablet  Commonly known as: LOPRESSOR   25 mg, Oral, 2 Times Daily      tamsulosin 0.4 MG capsule 24 hr capsule  Commonly known as: FLOMAX   2 capsules, Oral, Every Night at Bedtime      Boubacar Graham SoloStar 300 UNIT/ML solution pen-injector injection  Generic drug: Insulin Glargine (2 Unit Dial)   20 Units, Subcutaneous, Daily      vitamin C 500 MG tablet  Commonly known as: ASCORBIC ACID   500 mg, Oral, Daily      Vitamin D3 50 MCG (2000 UT) tablet   1 capsule, Oral, Daily             No Known Allergies      Discharge Disposition:  Home or Self Care    Diet:  Hospital:  Diet Order   Procedures   • Diet: Renal Diets; Low Sodium (2-3g), Low Potassium, Low Phosphorus; Texture: Regular Texture (IDDSI 7); Fluid Consistency: Thin (IDDSI 0)         Discharge Activity:   Activity Instructions     Gradually Increase Activity Until at Pre-Hospitalization Level              CODE STATUS:  There are no questions and answers to display.     I have utilized all available, immediate resources to obtain, update, or review the patient's current medications including all prescriptions, over-the-counter products, herbals, cannabis/cannabidiol products, and vitamin.mineral/dietary (nutritional) supplements.         Next of kin of Power of :   Admission Status:  I believe this patient meets obs status.    Hospital Medicine Quality Measures for Heart Failure:  The patient has a history of heart transplant or LVAD. no        Future Appointments   Date Time Provider Department Center   3/30/2023  2:00 PM [facility]  [facility]   3/30/2023  2:45 PM ROOM 1,  [facility]  [facility] None       Additional Instructions for the Follow-ups that You Need to Schedule     Discharge Follow-up with PCP   As directed       Currently Documented PCP:    Mary Le MD    PCP Phone  Number:    [phone]     Follow Up Details: 1 week         Discharge Follow-up with Specified Provider: renal; 2 Days   As directed      To: renal    Follow Up: 2 Days               Time spent on Discharge including face to face service:34 minutes    This patient has been examined wearing appropriate Personal Protective Equipment and discussed with rn. 03/27/23      Signature: Electronically signed by Duke Sumner MD, 03/27/23, 4:01 PM EDT.

## 2023-03-27 NOTE — CASE MANAGEMENT/SOCIAL WORK
Continued Stay Note  ZAYNAB Dunham     Patient Name: Gerry Torres  MRN: [id number]  Today's Date: 3/27/2023    Admit Date: 3/26/2023    Plan: DC PLAN: Routine home. Current OP HD MWF at 0705. [facility] in [place]      Discharge Plan     Row Name 03/27/23 1314       Plan    Plan DC PLAN: Routine home. Current OP HD MWF at 0705. [facility] in [place]    Patient/Family in Agreement with Plan yes    Plan Comments Called [facility] in [place] spoke with Naz who verified patient is on schedule for MWF at 0750. Needs to arrive at 0630 on Wednesday due to being first treatment. Patient made aware. Plans to discharbe today.                    Expected Discharge Date and Time     Expected Discharge Date Expected Discharge Time    Mar 27, 2023         Dora Zelaya RN     Office phone: [phone]  Cell phone: [phone]

## 2023-03-28 ENCOUNTER — READMISSION MANAGEMENT (OUTPATIENT)
Dept: CALL CENTER | Facility: HOSPITAL | Age: 73
End: 2023-03-28
Payer: MEDICARE

## 2023-03-28 NOTE — OUTREACH NOTE
Prep Survey    Flowsheet Row Responses   [facility] facility patient discharged from? [facility]   Is LACE score < 7 ? No   Eligibility Readm Mgmt   Discharge diagnosis hypovolemia   Does the patient have one of the following disease processes/diagnoses(primary or secondary)? Other   Does the patient have Home health ordered? No   Is there a DME ordered? No   General alerts for this patient OP HD MWF   Prep survey completed? Yes          Yamilet STEWART - Registered Nurse

## 2023-03-28 NOTE — CASE MANAGEMENT/SOCIAL WORK
Case Management Discharge Note      Final Note: Routine home  (OP HD with [facility] in [place], [place] MWF 0752)                  Transportation Services  Private: Car    Final Discharge Disposition Code: 01 - home or self-care

## 2023-03-30 ENCOUNTER — HOSPITAL ENCOUNTER (OUTPATIENT)
Dept: CARDIOLOGY | Facility: HOSPITAL | Age: 73
Discharge: HOME | End: 2023-03-30
Payer: MEDICARE

## 2023-03-30 ENCOUNTER — APPOINTMENT (OUTPATIENT)
Dept: VASCULAR SURGERY | Facility: HOSPITAL | Age: 73
End: 2023-03-30
Payer: MEDICARE

## 2023-03-30 DIAGNOSIS — N18.6 END STAGE RENAL DISEASE: ICD-10-CM

## 2023-03-30 LAB
BH CV UPPER VENOUS LEFT AXILLARY AUGMENT: NORMAL
BH CV UPPER VENOUS LEFT AXILLARY COMPRESS: NORMAL
BH CV UPPER VENOUS LEFT AXILLARY PHASIC: NORMAL
BH CV UPPER VENOUS LEFT AXILLARY SPONT: NORMAL
BH CV UPPER VENOUS LEFT BASILIC FOREARM COMPRESS: NORMAL
BH CV UPPER VENOUS LEFT BASILIC UPPER COMPRESS: NORMAL
BH CV UPPER VENOUS LEFT BRACHIAL COMPRESS: NORMAL
BH CV UPPER VENOUS LEFT CEPHALIC FOREARM COMPRESS: NORMAL
BH CV UPPER VENOUS LEFT CEPHALIC UPPER COLOR: 1
BH CV UPPER VENOUS LEFT CEPHALIC UPPER COMPRESS: NORMAL
BH CV UPPER VENOUS LEFT CEPHALIC UPPER THROMBUS: NORMAL
BH CV UPPER VENOUS LEFT INTERNAL JUGULAR AUGMENT: NORMAL
BH CV UPPER VENOUS LEFT INTERNAL JUGULAR COMPRESS: NORMAL
BH CV UPPER VENOUS LEFT INTERNAL JUGULAR PHASIC: NORMAL
BH CV UPPER VENOUS LEFT INTERNAL JUGULAR SPONT: NORMAL
BH CV UPPER VENOUS LEFT SUBCLAVIAN AUGMENT: NORMAL
BH CV UPPER VENOUS LEFT SUBCLAVIAN COMPRESS: NORMAL
BH CV UPPER VENOUS LEFT SUBCLAVIAN PHASIC: NORMAL
BH CV UPPER VENOUS LEFT SUBCLAVIAN SPONT: NORMAL
BH CV UPPER VENOUS RIGHT AXILLARY AUGMENT: NORMAL
BH CV UPPER VENOUS RIGHT AXILLARY COMPRESS: NORMAL
BH CV UPPER VENOUS RIGHT AXILLARY PHASIC: NORMAL
BH CV UPPER VENOUS RIGHT AXILLARY SPONT: NORMAL
BH CV UPPER VENOUS RIGHT BASILIC FOREARM COMPRESS: NORMAL
BH CV UPPER VENOUS RIGHT BASILIC UPPER COMPRESS: NORMAL
BH CV UPPER VENOUS RIGHT BRACHIAL COMPRESS: NORMAL
BH CV UPPER VENOUS RIGHT CEPHALIC FOREARM COMPRESS: NORMAL
BH CV UPPER VENOUS RIGHT CEPHALIC UPPER COLOR: 1
BH CV UPPER VENOUS RIGHT CEPHALIC UPPER COMPRESS: NORMAL
BH CV UPPER VENOUS RIGHT CEPHALIC UPPER THROMBUS: NORMAL
BH CV UPPER VENOUS RIGHT INTERNAL JUGULAR AUGMENT: NORMAL
BH CV UPPER VENOUS RIGHT INTERNAL JUGULAR COMPRESS: NORMAL
BH CV UPPER VENOUS RIGHT INTERNAL JUGULAR PHASIC: NORMAL
BH CV UPPER VENOUS RIGHT INTERNAL JUGULAR SPONT: NORMAL
BH CV UPPER VENOUS RIGHT SUBCLAVIAN AUGMENT: NORMAL
BH CV UPPER VENOUS RIGHT SUBCLAVIAN COMPRESS: NORMAL
BH CV UPPER VENOUS RIGHT SUBCLAVIAN PHASIC: NORMAL
BH CV UPPER VENOUS RIGHT SUBCLAVIAN SPONT: NORMAL
BH CV VAS MEAS BASILIC ANTECUBITAL FOSSA LEFT: 0.41 CM
BH CV VAS MEAS BASILIC ANTECUBITAL FOSSA RIGHT: 0.34 CM
BH CV VAS MEAS BASILIC FOREARM LEFT - DIST: 0.31 CM
BH CV VAS MEAS BASILIC FOREARM LEFT - MID: 0.36 CM
BH CV VAS MEAS BASILIC FOREARM LEFT - PROX: 0.38 CM
BH CV VAS MEAS BASILIC FOREARM RIGHT - DIST: 0.29 CM
BH CV VAS MEAS BASILIC FOREARM RIGHT - MID: 0.33 CM
BH CV VAS MEAS BASILIC FOREARM RIGHT - PROX: 0.36 CM
BH CV VAS MEAS BASILIC UPPER ARM LEFT - DIST: 0.49 CM
BH CV VAS MEAS BASILIC UPPER ARM LEFT - MID: 0.48 CM
BH CV VAS MEAS BASILIC UPPER ARM LEFT - PROX: 0.47 CM
BH CV VAS MEAS BASILIC UPPER ARM RIGHT - DIST: 0.58 CM
BH CV VAS MEAS BASILIC UPPER ARM RIGHT - MID: 0.55 CM
BH CV VAS MEAS BASILIC UPPER ARM RIGHT - PROX: 0.4 CM
BH CV VAS MEAS CEPHALIC FOREARM LEFT - DIST: 0.39 CM
BH CV VAS MEAS CEPHALIC FOREARM LEFT - MID: 0.35 CM
BH CV VAS MEAS CEPHALIC FOREARM LEFT - PROX: 0.44 CM
BH CV VAS MEAS CEPHALIC FOREARM RIGHT - DIST: 0.3 CM
BH CV VAS MEAS CEPHALIC FOREARM RIGHT - MID: 0.33 CM
BH CV VAS MEAS CEPHALIC FOREARM RIGHT - PROX: 0.34 CM
BH CV VAS MEAS CEPHALIC UPPER ARM LEFT - MID: 0.49 CM
BH CV VAS MEAS CEPHALIC UPPER ARM LEFT - PROX: 0.39 CM
BH CV VAS MEAS CEPHALIC UPPER ARM RIGHT - DIST: 0.17 CM
BH CV VAS MEAS CEPHALIC UPPER ARM RIGHT - MID: 0.37 CM
BH CV VAS MEAS CEPHALIC UPPER ARM RIGHT - PROX: 0.35 CM
BH CV VAS MEAS RADIAL UPPER ARM LEFT - DIST: 0.28 CM
BH CV VAS MEAS RADIAL UPPER ARM LEFT - MID: 0.27 CM
BH CV VAS MEAS RADIAL UPPER ARM LEFT - PROX: 0.27 CM
BH CV VAS MEAS RADIAL UPPER ARM RIGHT - DIST: 0.27 CM
BH CV VAS MEAS RADIAL UPPER ARM RIGHT - MID: 0.26 CM
BH CV VAS MEAS RADIAL UPPER ARM RIGHT - PROX: 0.26 CM
MAXIMAL PREDICTED HEART RATE: 149 BPM
STRESS TARGET HR: 127 BPM
UPPER ARTERIAL LEFT ARM BRACHIAL LENGTH: 0.58 CM
UPPER ARTERIAL RIGHT ARM BRACHIAL LENGTH: 0.55 CM

## 2023-03-30 PROCEDURE — 93985 DUP-SCAN HEMO COMPL BI STD: CPT

## 2023-03-30 PROCEDURE — G0463 HOSPITAL OUTPT CLINIC VISIT: HCPCS

## 2023-03-30 NOTE — PROGRESS NOTES
"Enter Query Response Below      Query Response:     I believe the patient has type 2 diabetes    Electronically signed by Gabriel Casanova MD, 23, 9:20 AM EDT.         If applicable, please update the problem list.     Patient: Gerry Torres        : 1950  Account: [id number]           Admit Date: 3/20/2023        How to Respond to this query:       a. Click New Note     b. Answer query within the yellow box.                c. Update the Problem List, if applicable.      If you have any questions about this query contact me at: [email or web address]     Dr. Casanova,    H&P documents history of type 1 diabetes.  Endocrinologist documents \"Diabetes mellitus type 2...Last A1c 6.4%,\" and home Insulin of \"Toujeo 47 units at bedtime and Humalog 9 units before breakfast and lunch and 13 before supper.  He was on Lantus 40 units nightly.\"  Patient has experienced hypoglycemia and hyperglycemia this stay.  Treatment included Dextrose 50 and adjusting Insulin dose.    Please specify the type of diabetes.    By submitting this query, we are merely seeking further clarification of documentation to accurately reflect all conditions that you are monitoring, evaluating, treating or that extend the hospitalization or utilize additional resources of care. Please utilize your independent clinical judgment when addressing the question(s) above.     This query and your response, once completed, will be entered into the legal medical record.    Sincerely,  Chayito KENDRICK, RN  Clinical Documentation Integrity Program   [email or web address]   "

## 2023-04-05 ENCOUNTER — READMISSION MANAGEMENT (OUTPATIENT)
Dept: CALL CENTER | Facility: HOSPITAL | Age: 73
End: 2023-04-05
Payer: MEDICARE

## 2023-04-05 NOTE — OUTREACH NOTE
Medical Week 1 Survey    Flowsheet Row Responses   [facility] patient discharged from? [facility]   Does the patient have one of the following disease processes/diagnoses(primary or secondary)? Other   Week 1 attempt successful? Yes   Call start time 1115   Call end time 1119   General alerts for this patient OP HD MWF   Discharge diagnosis Hypervolemia, ESRD on HD   Person spoke with today (if not patient) and relationship spouse, Ying Wright reviewed with patient/caregiver? Yes   Does the patient have all medications ordered at discharge? Yes   Is the patient taking all medications as directed (includes completed medication regime)? Yes   Comments regarding appointments New OP HD set up: [facility]. Start date Monday 3/27   Does the patient have a primary care provider?  Yes   Does the patient have an appointment with their PCP within 7 days of discharge? Yes   Comments regarding PCP Mary Le MD PCP   Has the patient kept scheduled appointments due by today? Yes  [Reports that patient has been to dialysis as scheduled this week. ]   Comments Reports has all needed f/u appts in place.    Has home health visited the patient within 72 hours of discharge? N/A   Psychosocial issues? No   Did the patient receive a copy of their discharge instructions? Yes   Nursing interventions Reviewed instructions with patient   What is the patient's perception of their health status since discharge? Improving   Is the patient/caregiver able to teach back signs and symptoms related to disease process for when to call PCP? Yes   Is the patient/caregiver able to teach back the hierarchy of who to call/visit for symptoms/problems? PCP, Specialist, Home health nurse, Urgent Care, ED, 911 Yes   If the patient is a current smoker, are they able to teach back resources for cessation? Not a smoker   Week 1 call completed? Yes   Is the patient interested in additional calls from an ambulatory ?  NOTE:   applies to high risk patients requiring additional follow-up. No   Revoked No further contact(revokes)-requires comment   Graduated/Revoked comments Wife declines further follow up calls.           ERICK PAVON - Registered Nurse

## 2023-04-06 ENCOUNTER — OFFICE VISIT (OUTPATIENT)
Dept: CARDIOLOGY | Facility: CLINIC | Age: 73
End: 2023-04-06
Payer: MEDICARE

## 2023-04-06 VITALS
BODY MASS INDEX: 35.21 KG/M2 | DIASTOLIC BLOOD PRESSURE: 67 MMHG | HEART RATE: 55 BPM | HEIGHT: 72 IN | WEIGHT: 260 LBS | SYSTOLIC BLOOD PRESSURE: 151 MMHG | OXYGEN SATURATION: 94 %

## 2023-04-06 DIAGNOSIS — I10 PRIMARY HYPERTENSION: Primary | ICD-10-CM

## 2023-04-06 DIAGNOSIS — N18.6 ESRD (END STAGE RENAL DISEASE): ICD-10-CM

## 2023-04-06 DIAGNOSIS — E78.5 HYPERLIPIDEMIA, UNSPECIFIED HYPERLIPIDEMIA TYPE: ICD-10-CM

## 2023-04-06 PROCEDURE — 3077F SYST BP >= 140 MM HG: CPT | Performed by: INTERNAL MEDICINE

## 2023-04-06 PROCEDURE — 1159F MED LIST DOCD IN RCRD: CPT | Performed by: INTERNAL MEDICINE

## 2023-04-06 PROCEDURE — 99214 OFFICE O/P EST MOD 30 MIN: CPT | Performed by: INTERNAL MEDICINE

## 2023-04-06 PROCEDURE — 1160F RVW MEDS BY RX/DR IN RCRD: CPT | Performed by: INTERNAL MEDICINE

## 2023-04-06 PROCEDURE — 3078F DIAST BP <80 MM HG: CPT | Performed by: INTERNAL MEDICINE

## 2023-04-06 RX ORDER — LOSARTAN POTASSIUM 50 MG/1
50 TABLET ORAL DAILY
Qty: 90 TABLET | Refills: 3 | Status: SHIPPED | OUTPATIENT
Start: 2023-04-06

## 2023-04-06 NOTE — PROGRESS NOTES
Encounter Date:04/06/2023      Patient ID: Gerry Torres is a 72 y.o. male.    Chief Complaint   Patient presents with   • Follow-up     Hospital F/U          History of Present Illness    Gerry Torres is a 72 y.o. male with a history of diabetes, hypertension, hyperlipidemia, pancreatic cancer status post Whipple procedure and splenectomy and partial nephrectomy in 2022, end-stage renal disease who presented to [facility] with complaint of dyspnea on exertion and weakness after recent hospitalization.   At that time he was found to be fluid overloaded and underwent dialysis.  His troponins were mildly elevated and stable with no EKG changes and no chest pain.  He has been doing very well since then.  Denies any chest pain or shortness of breath.  Getting dialysis Monday Wednesday Friday.  He does keep a log of his blood pressure at home and mentions that its runs in the 140s to 150s systolic.  Denies any dizziness or lightheadedness.  He does still make urine.    Previous cardiac work-up includes a stress test in January 2023 done at [facility] which was negative for ischemia  Results for orders placed during the hospital encounter of 03/20/23    Adult Transthoracic Echo Complete w/ Color, Spectral and Contrast if Necessary Per Protocol    Interpretation Summary  •  Left ventricular systolic function is normal. Calculated left ventricular EF = 57%  •  Left ventricular wall thickness is consistent with mild concentric hypertrophy.  •  The right atrial cavity is mild to moderately  dilated.  •  There is calcification of the aortic valve.  •  Abnormal mitral valve structure consistent with dilated annulus.  •  There is a trivial pericardial effusion.    Transthoracic echocardiography reveals EF of 57%.  Trivial to small pericardial effusion noted.  Mild MR and mild TR noted.  Moderate left atrial enlargement.    Electronically signed by Tramaine Pérez MD, 03/22/23, 12:08 PM  EDT.        The following portions of the patient's history were reviewed and updated as appropriate: allergies, current medications, past family history, past medical history, past social history, past surgical history, and problem list.    Review of Systems   Constitutional: Negative for malaise/fatigue.   Cardiovascular: Positive for leg swelling and palpitations. Negative for chest pain and dyspnea on exertion.   Respiratory: Negative for cough and shortness of breath.    Gastrointestinal: Negative for abdominal pain, nausea and vomiting.   Neurological: Negative for dizziness, focal weakness, headaches, light-headedness and numbness.   All other systems reviewed and are negative.        Current Outpatient Medications:   •  amLODIPine (NORVASC) 10 MG tablet, Take 1 tablet by mouth Daily., Disp: , Rfl:   •  calcitriol (ROCALTROL) 0.5 MCG capsule, Take 1 capsule by mouth 3 (Three) Times a Week. Monday, Wednesday and Friday, Disp: , Rfl:   •  carvedilol (COREG) 12.5 MG tablet, Take 1 tablet by mouth 2 (Two) Times a Day With Meals., Disp: 60 tablet, Rfl: 0  •  Cholecalciferol (VITAMIN D3) 2000 units tablet, Take 1 tablet by mouth Daily., Disp: , Rfl:   •  cyanocobalamin 1000 MCG/ML injection, Inject 1 mL into the appropriate muscle as directed by prescriber Every 30 (Thirty) Days., Disp: , Rfl:   •  doxazosin (CARDURA) 8 MG tablet, Take 1 tablet by mouth 2 (Two) Times a Day., Disp: , Rfl:   •  furosemide (LASIX) 80 MG tablet, Take 1 tablet by mouth 2 (Two) Times a Day., Disp: 60 tablet, Rfl: 0  •  hydrALAZINE (APRESOLINE) 100 MG tablet, Take 1 tablet by mouth 3 (Three) Times a Day., Disp: , Rfl:   •  ibandronate (BONIVA) 150 MG tablet, Take 1 tablet by mouth Every 30 (Thirty) Days., Disp: , Rfl:   •  Insulin Glargine, 2 Unit Dial, (Toujeo Max SoloStar) 300 UNIT/ML solution pen-injector injection, Inject 20 Units under the skin into the appropriate area as directed Daily for 30 days., Disp: 2 mL, Rfl: 0  •   "levocetirizine (XYZAL) 5 MG tablet, Take 1 tablet by mouth Every Evening., Disp: , Rfl:   •  LUMIGAN 0.01 % ophthalmic drops, Administer 1 drop to both eyes Every Night. Instill 1 drop into both eyes daily at bedtime, Disp: , Rfl: 2  •  metoclopramide (REGLAN) 10 MG tablet, Take 1 tablet by mouth 2 (Two) Times a Day. Morning and lunch, Disp: , Rfl:   •  Multiple Vitamins-Minerals (GWENDOLYN MULTIVITAMIN FOR MEN) tablet, Take 1 tablet by mouth Daily. Take one daily, Disp: , Rfl:   •  tamsulosin (FLOMAX) 0.4 MG capsule 24 hr capsule, Take 2 capsules by mouth every night at bedtime., Disp: , Rfl:   •  vitamin C (ASCORBIC ACID) 500 MG tablet, Take 1 tablet by mouth Daily., Disp: , Rfl:     No Known Allergies    History reviewed. No pertinent family history.    Past Surgical History:   Procedure Laterality Date   • HERNIA REPAIR     • KNEE ARTHROSCOPY Left    • SPLENECTOMY     • TONSILLECTOMY     • VITRECTOMY PARS PLANA Right 10/22/2019    Procedure: 25G VITRECTOMY-ENDOLASER;  Surgeon: Lazarus, Howard S., MD;  Location: [facility] MAIN OR;  Service: Ophthalmology       Past Medical History:   Diagnosis Date   • Diabetes mellitus    • Hyperlipidemia    • Hypertension    • Sleep apnea        Social History     Socioeconomic History   • Marital status:    Tobacco Use   • Smoking status: Former     Packs/day: 1.00     Years: 49.00     Pack years: 49.00     Types: Cigarettes     Quit date: 2018     Years since quittin.3     Passive exposure: Past   Vaping Use   • Vaping Use: Never used   Substance and Sexual Activity   • Alcohol use: Not Currently   • Drug use: Yes     Frequency: 5.0 times per week     Types: Marijuana     Comment: occasional gummy   • Sexual activity: Defer         Procedures      Objective:       Physical Exam    /67 (BP Location: Right arm, Patient Position: Sitting)   Pulse 55   Ht 182.9 cm (72\")   Wt 118 kg (260 lb)   SpO2 94%   BMI 35.26 kg/m²   The patient is alert, oriented " and in no distress.    Vital signs as noted above.    Head and neck revealed no carotid bruits or jugular venous distension.  No thyromegaly or lymphadenopathy is present.    Lungs clear.  No wheezing.  Breath sounds are normal bilaterally.    Heart normal first and second heart sounds.  No murmur..  No pericardial rub is present.  No gallop is present.    Abdomen soft and nontender.  No organomegaly is present.    Extremities revealed good peripheral pulses without any pedal edema.    Skin warm and dry.    Musculoskeletal system is grossly normal.    CNS grossly normal.           Diagnosis Plan   1. Primary hypertension        2. Hyperlipidemia, unspecified hyperlipidemia type        3. ESRD (end stage renal disease)        LAB RESULTS (LAST 7 DAYS)    CBC        BMP        CMP         BNP        TROPONIN        CoAg        Creatinine Clearance  Estimated Creatinine Clearance: 27.6 mL/min (A) (by C-G formula based on SCr of 3.26 mg/dL (H)).    ABG        Radiology  No radiology results for the last day         Assessment and Plan       Diagnoses and all orders for this visit:    1. Primary hypertension (Primary)    2. Hyperlipidemia, unspecified hyperlipidemia type    3. ESRD (end stage renal disease)           Hypertension  Uncontrolled  Continue with amlodipine 10 mg and hydralazine and Coreg  Start losartan now that he is on dialysis    End-stage renal disease  On dialysis Monday Wednesday Friday  Plans for AV fistula formation later this month  Continue with Lasix    Preventative care  Recent lipid panel shows LDL of 70 with a total cholesterol of 131          Deepti Culver MD

## 2023-04-08 ENCOUNTER — INPATIENT HOSPITAL (OUTPATIENT)
Dept: URBAN - METROPOLITAN AREA HOSPITAL 76 | Facility: HOSPITAL | Age: 73
End: 2023-04-08

## 2023-04-08 DIAGNOSIS — E11.65 TYPE 2 DIABETES MELLITUS WITH HYPERGLYCEMIA: ICD-10-CM

## 2023-04-08 DIAGNOSIS — R10.13 EPIGASTRIC PAIN: ICD-10-CM

## 2023-04-08 DIAGNOSIS — Z99.2 DEPENDENCE ON RENAL DIALYSIS: ICD-10-CM

## 2023-04-08 DIAGNOSIS — K31.84 GASTROPARESIS: ICD-10-CM

## 2023-04-08 DIAGNOSIS — Z79.891 LONG TERM (CURRENT) USE OF OPIATE ANALGESIC: ICD-10-CM

## 2023-04-08 DIAGNOSIS — N18.6 END STAGE RENAL DISEASE: ICD-10-CM

## 2023-04-08 DIAGNOSIS — Z85.07 PERSONAL HISTORY OF MALIGNANT NEOPLASM OF PANCREAS: ICD-10-CM

## 2023-04-08 DIAGNOSIS — K21.9 GASTRO-ESOPHAGEAL REFLUX DISEASE WITHOUT ESOPHAGITIS: ICD-10-CM

## 2023-04-08 PROCEDURE — 99222 1ST HOSP IP/OBS MODERATE 55: CPT | Performed by: INTERNAL MEDICINE

## 2023-04-09 PROCEDURE — 99232 SBSQ HOSP IP/OBS MODERATE 35: CPT | Performed by: INTERNAL MEDICINE

## 2023-04-10 ENCOUNTER — INPATIENT HOSPITAL (OUTPATIENT)
Dept: URBAN - METROPOLITAN AREA HOSPITAL 76 | Facility: HOSPITAL | Age: 73
End: 2023-04-10

## 2023-04-10 DIAGNOSIS — K85.90 ACUTE PANCREATITIS WITHOUT NECROSIS OR INFECTION, UNSPECIFIE: ICD-10-CM

## 2023-04-10 DIAGNOSIS — R10.84 GENERALIZED ABDOMINAL PAIN: ICD-10-CM

## 2023-04-10 PROCEDURE — 99232 SBSQ HOSP IP/OBS MODERATE 35: CPT | Mod: FS

## 2023-04-11 ENCOUNTER — INPATIENT HOSPITAL (OUTPATIENT)
Dept: URBAN - METROPOLITAN AREA HOSPITAL 76 | Facility: HOSPITAL | Age: 73
End: 2023-04-11

## 2023-04-11 DIAGNOSIS — R10.12 LEFT UPPER QUADRANT PAIN: ICD-10-CM

## 2023-04-11 PROCEDURE — 99231 SBSQ HOSP IP/OBS SF/LOW 25: CPT | Mod: FS

## 2023-04-13 RX ORDER — HYDROCODONE BITARTRATE AND ACETAMINOPHEN 10; 325 MG/1; MG/1
1 TABLET ORAL 4 TIMES DAILY PRN
COMMUNITY

## 2023-04-13 RX ORDER — DICYCLOMINE HYDROCHLORIDE 10 MG/1
10 CAPSULE ORAL 4 TIMES DAILY PRN
COMMUNITY

## 2023-04-13 RX ORDER — PANTOPRAZOLE SODIUM 40 MG/1
40 TABLET, DELAYED RELEASE ORAL 2 TIMES DAILY
COMMUNITY

## 2023-04-13 RX ORDER — INSULIN LISPRO 100 [IU]/ML
9 INJECTION, SOLUTION INTRAVENOUS; SUBCUTANEOUS 2 TIMES DAILY
COMMUNITY

## 2023-04-13 RX ORDER — PREGABALIN 25 MG/1
25 CAPSULE ORAL NIGHTLY
COMMUNITY

## 2023-04-25 ENCOUNTER — OFFICE (OUTPATIENT)
Dept: URBAN - METROPOLITAN AREA CLINIC 64 | Facility: CLINIC | Age: 73
End: 2023-04-25

## 2023-04-25 VITALS
SYSTOLIC BLOOD PRESSURE: 107 MMHG | HEIGHT: 72 IN | WEIGHT: 256.2 LBS | HEART RATE: 71 BPM | DIASTOLIC BLOOD PRESSURE: 53 MMHG

## 2023-04-25 DIAGNOSIS — F11.90 OPIOID USE, UNSPECIFIED, UNCOMPLICATED: ICD-10-CM

## 2023-04-25 DIAGNOSIS — R10.9 UNSPECIFIED ABDOMINAL PAIN: ICD-10-CM

## 2023-04-25 DIAGNOSIS — Z79.4 LONG TERM (CURRENT) USE OF INSULIN: ICD-10-CM

## 2023-04-25 DIAGNOSIS — D3A.8 OTHER BENIGN NEUROENDOCRINE TUMORS: ICD-10-CM

## 2023-04-25 DIAGNOSIS — Z87.891 PERSONAL HISTORY OF NICOTINE DEPENDENCE: ICD-10-CM

## 2023-04-25 PROCEDURE — 99214 OFFICE O/P EST MOD 30 MIN: CPT

## 2023-04-25 RX ORDER — PANCRELIPASE 36000; 180000; 114000 [USP'U]/1; [USP'U]/1; [USP'U]/1
CAPSULE, DELAYED RELEASE PELLETS ORAL
Qty: 240 | Refills: 11 | Status: ACTIVE
Start: 2023-04-25

## 2023-04-25 RX ORDER — PREGABALIN 50 MG/1
50 CAPSULE ORAL
Qty: 30 | Refills: 3 | Status: ACTIVE
Start: 2023-04-25

## 2023-04-26 ENCOUNTER — ANESTHESIA EVENT (OUTPATIENT)
Dept: PERIOP | Facility: HOSPITAL | Age: 73
End: 2023-04-26
Payer: MEDICARE

## 2023-04-27 ENCOUNTER — ANESTHESIA (OUTPATIENT)
Dept: PERIOP | Facility: HOSPITAL | Age: 73
End: 2023-04-27
Payer: MEDICARE

## 2023-04-27 ENCOUNTER — HOSPITAL ENCOUNTER (OUTPATIENT)
Facility: HOSPITAL | Age: 73
Setting detail: HOSPITAL OUTPATIENT SURGERY
Discharge: HOME | End: 2023-04-27
Attending: STUDENT IN AN ORGANIZED HEALTH CARE EDUCATION/TRAINING PROGRAM | Admitting: STUDENT IN AN ORGANIZED HEALTH CARE EDUCATION/TRAINING PROGRAM
Payer: MEDICARE

## 2023-04-27 VITALS
HEIGHT: 72 IN | HEART RATE: 82 BPM | SYSTOLIC BLOOD PRESSURE: 122 MMHG | TEMPERATURE: 96.9 F | OXYGEN SATURATION: 90 % | WEIGHT: 252 LBS | BODY MASS INDEX: 34.13 KG/M2 | DIASTOLIC BLOOD PRESSURE: 62 MMHG | RESPIRATION RATE: 17 BRPM

## 2023-04-27 LAB
ABO GROUP BLD: NORMAL
ANION GAP SERPL CALCULATED.3IONS-SCNC: 16 MMOL/L (ref 5–15)
BLD GP AB SCN SERPL QL: NEGATIVE
BUN SERPL-MCNC: 32 MG/DL (ref 8–23)
BUN/CREAT SERPL: 7 (ref 7–25)
CALCIUM SPEC-SCNC: 8.4 MG/DL (ref 8.6–10.5)
CHLORIDE SERPL-SCNC: 97 MMOL/L (ref 98–107)
CO2 SERPL-SCNC: 23 MMOL/L (ref 22–29)
CREAT SERPL-MCNC: 4.54 MG/DL (ref 0.76–1.27)
DEPRECATED RDW RBC AUTO: 50.3 FL (ref 37–54)
EGFRCR SERPLBLD CKD-EPI 2021: 13.1 ML/MIN/1.73
ERYTHROCYTE [DISTWIDTH] IN BLOOD BY AUTOMATED COUNT: 14.9 % (ref 12.3–15.4)
GLUCOSE SERPL-MCNC: 122 MG/DL (ref 65–99)
HCT VFR BLD AUTO: 32.1 % (ref 37.5–51)
HGB BLD-MCNC: 9.9 G/DL (ref 13–17.7)
MCH RBC QN AUTO: 29.8 PG (ref 26.6–33)
MCHC RBC AUTO-ENTMCNC: 30.8 G/DL (ref 31.5–35.7)
MCV RBC AUTO: 96.8 FL (ref 79–97)
PLATELET # BLD AUTO: 208 10*3/MM3 (ref 140–450)
PMV BLD AUTO: 9.4 FL (ref 6–12)
POTASSIUM SERPL-SCNC: 4.6 MMOL/L (ref 3.5–5.2)
RBC # BLD AUTO: 3.31 10*6/MM3 (ref 4.14–5.8)
RH BLD: POSITIVE
SODIUM SERPL-SCNC: 136 MMOL/L (ref 136–145)
T&S EXPIRATION DATE: NORMAL
WBC NRBC COR # BLD: 8.5 10*3/MM3 (ref 3.4–10.8)

## 2023-04-27 PROCEDURE — 86850 RBC ANTIBODY SCREEN: CPT | Performed by: STUDENT IN AN ORGANIZED HEALTH CARE EDUCATION/TRAINING PROGRAM

## 2023-04-27 PROCEDURE — 94761 N-INVAS EAR/PLS OXIMETRY MLT: CPT

## 2023-04-27 PROCEDURE — 25010000002 FENTANYL CITRATE (PF) 50 MCG/ML SOLUTION: Performed by: ANESTHESIOLOGY

## 2023-04-27 PROCEDURE — 86900 BLOOD TYPING SEROLOGIC ABO: CPT | Performed by: STUDENT IN AN ORGANIZED HEALTH CARE EDUCATION/TRAINING PROGRAM

## 2023-04-27 PROCEDURE — 25010000002 ROPIVACAINE PER 1 MG: Performed by: ANESTHESIOLOGY

## 2023-04-27 PROCEDURE — 25010000002 MIDAZOLAM PER 1 MG: Performed by: ANESTHESIOLOGY

## 2023-04-27 PROCEDURE — 25010000002 CEFAZOLIN PER 500 MG: Performed by: STUDENT IN AN ORGANIZED HEALTH CARE EDUCATION/TRAINING PROGRAM

## 2023-04-27 PROCEDURE — 86901 BLOOD TYPING SEROLOGIC RH(D): CPT | Performed by: STUDENT IN AN ORGANIZED HEALTH CARE EDUCATION/TRAINING PROGRAM

## 2023-04-27 PROCEDURE — 25010000002 PROPOFOL 1000 MG/100ML EMULSION: Performed by: NURSE ANESTHETIST, CERTIFIED REGISTERED

## 2023-04-27 PROCEDURE — 94640 AIRWAY INHALATION TREATMENT: CPT

## 2023-04-27 PROCEDURE — 94799 UNLISTED PULMONARY SVC/PX: CPT

## 2023-04-27 PROCEDURE — 25010000002 DEXAMETHASONE PER 1 MG: Performed by: ANESTHESIOLOGY

## 2023-04-27 PROCEDURE — 25010000002 HEPARIN (PORCINE) PER 1000 UNITS: Performed by: NURSE ANESTHETIST, CERTIFIED REGISTERED

## 2023-04-27 PROCEDURE — 25010000002 PROPOFOL 200 MG/20ML EMULSION: Performed by: NURSE ANESTHETIST, CERTIFIED REGISTERED

## 2023-04-27 PROCEDURE — 25010000002 HEPARIN (PORCINE) PER 1000 UNITS: Performed by: STUDENT IN AN ORGANIZED HEALTH CARE EDUCATION/TRAINING PROGRAM

## 2023-04-27 PROCEDURE — 85027 COMPLETE CBC AUTOMATED: CPT | Performed by: STUDENT IN AN ORGANIZED HEALTH CARE EDUCATION/TRAINING PROGRAM

## 2023-04-27 PROCEDURE — 94664 DEMO&/EVAL PT USE INHALER: CPT

## 2023-04-27 PROCEDURE — 80048 BASIC METABOLIC PNL TOTAL CA: CPT | Performed by: STUDENT IN AN ORGANIZED HEALTH CARE EDUCATION/TRAINING PROGRAM

## 2023-04-27 PROCEDURE — C1768 GRAFT, VASCULAR: HCPCS | Performed by: STUDENT IN AN ORGANIZED HEALTH CARE EDUCATION/TRAINING PROGRAM

## 2023-04-27 PROCEDURE — 86901 BLOOD TYPING SEROLOGIC RH(D): CPT

## 2023-04-27 PROCEDURE — 86900 BLOOD TYPING SEROLOGIC ABO: CPT

## 2023-04-27 DEVICE — GRFT VASC PROPAT HEP IR 4/7 38 45CM: Type: IMPLANTABLE DEVICE | Site: ARM | Status: FUNCTIONAL

## 2023-04-27 DEVICE — ABSORBABLE HEMOSTAT (OXIDIZED REGENERATED CELLULOSE, U.S.P.)
Type: IMPLANTABLE DEVICE | Site: ARM | Status: FUNCTIONAL
Brand: SURGICEL FIBRILLAR

## 2023-04-27 DEVICE — LIGACLIP MCA MULTIPLE CLIP APPLIERS, 20 SMALL CLIPS
Type: IMPLANTABLE DEVICE | Site: ARM | Status: FUNCTIONAL
Brand: LIGACLIP

## 2023-04-27 RX ORDER — PROPOFOL 10 MG/ML
INJECTION, EMULSION INTRAVENOUS CONTINUOUS PRN
Status: DISCONTINUED | OUTPATIENT
Start: 2023-04-27 | End: 2023-04-27 | Stop reason: SURG

## 2023-04-27 RX ORDER — FENTANYL CITRATE 50 UG/ML
50 INJECTION, SOLUTION INTRAMUSCULAR; INTRAVENOUS
Status: DISCONTINUED | OUTPATIENT
Start: 2023-04-27 | End: 2023-04-27 | Stop reason: HOSPADM

## 2023-04-27 RX ORDER — DIPHENHYDRAMINE HYDROCHLORIDE 50 MG/ML
12.5 INJECTION INTRAMUSCULAR; INTRAVENOUS ONCE AS NEEDED
Status: DISCONTINUED | OUTPATIENT
Start: 2023-04-27 | End: 2023-04-27 | Stop reason: HOSPADM

## 2023-04-27 RX ORDER — DEXMEDETOMIDINE HYDROCHLORIDE 100 UG/ML
INJECTION, SOLUTION INTRAVENOUS AS NEEDED
Status: DISCONTINUED | OUTPATIENT
Start: 2023-04-27 | End: 2023-04-27 | Stop reason: SURG

## 2023-04-27 RX ORDER — SODIUM CHLORIDE 0.9 % (FLUSH) 0.9 %
10 SYRINGE (ML) INJECTION EVERY 12 HOURS SCHEDULED
Status: DISCONTINUED | OUTPATIENT
Start: 2023-04-27 | End: 2023-04-27 | Stop reason: HOSPADM

## 2023-04-27 RX ORDER — CEFAZOLIN SODIUM IN 0.9 % NACL 3 G/100 ML
3 INTRAVENOUS SOLUTION, PIGGYBACK (ML) INTRAVENOUS ONCE
Status: COMPLETED | OUTPATIENT
Start: 2023-04-27 | End: 2023-04-27

## 2023-04-27 RX ORDER — EPHEDRINE SULFATE 5 MG/ML
INJECTION INTRAVENOUS AS NEEDED
Status: DISCONTINUED | OUTPATIENT
Start: 2023-04-27 | End: 2023-04-27 | Stop reason: SURG

## 2023-04-27 RX ORDER — PROPOFOL 10 MG/ML
INJECTION, EMULSION INTRAVENOUS AS NEEDED
Status: DISCONTINUED | OUTPATIENT
Start: 2023-04-27 | End: 2023-04-27 | Stop reason: SURG

## 2023-04-27 RX ORDER — SODIUM CHLORIDE 0.9 % (FLUSH) 0.9 %
10 SYRINGE (ML) INJECTION AS NEEDED
Status: DISCONTINUED | OUTPATIENT
Start: 2023-04-27 | End: 2023-04-27 | Stop reason: HOSPADM

## 2023-04-27 RX ORDER — PHENYLEPHRINE HCL IN 0.9% NACL 1 MG/10 ML
SYRINGE (ML) INTRAVENOUS AS NEEDED
Status: DISCONTINUED | OUTPATIENT
Start: 2023-04-27 | End: 2023-04-27 | Stop reason: SURG

## 2023-04-27 RX ORDER — DEXAMETHASONE SODIUM PHOSPHATE 4 MG/ML
8 INJECTION, SOLUTION INTRA-ARTICULAR; INTRALESIONAL; INTRAMUSCULAR; INTRAVENOUS; SOFT TISSUE ONCE AS NEEDED
Status: DISCONTINUED | OUTPATIENT
Start: 2023-04-27 | End: 2023-04-27 | Stop reason: HOSPADM

## 2023-04-27 RX ORDER — SODIUM CHLORIDE 9 MG/ML
20 INJECTION, SOLUTION INTRAVENOUS CONTINUOUS
Status: DISCONTINUED | OUTPATIENT
Start: 2023-04-27 | End: 2023-04-27 | Stop reason: HOSPADM

## 2023-04-27 RX ORDER — BUPIVACAINE HYDROCHLORIDE 2.5 MG/ML
INJECTION, SOLUTION EPIDURAL; INFILTRATION; INTRACAUDAL AS NEEDED
Status: DISCONTINUED | OUTPATIENT
Start: 2023-04-27 | End: 2023-04-27 | Stop reason: HOSPADM

## 2023-04-27 RX ORDER — HYDROCODONE BITARTRATE AND ACETAMINOPHEN 5; 325 MG/1; MG/1
1 TABLET ORAL ONCE AS NEEDED
Status: COMPLETED | OUTPATIENT
Start: 2023-04-27 | End: 2023-04-27

## 2023-04-27 RX ORDER — HEPARIN SODIUM 1000 [USP'U]/ML
INJECTION, SOLUTION INTRAVENOUS; SUBCUTANEOUS AS NEEDED
Status: DISCONTINUED | OUTPATIENT
Start: 2023-04-27 | End: 2023-04-27 | Stop reason: SURG

## 2023-04-27 RX ORDER — SODIUM CHLORIDE 9 MG/ML
40 INJECTION, SOLUTION INTRAVENOUS AS NEEDED
Status: DISCONTINUED | OUTPATIENT
Start: 2023-04-27 | End: 2023-04-27 | Stop reason: HOSPADM

## 2023-04-27 RX ORDER — IPRATROPIUM BROMIDE AND ALBUTEROL SULFATE 2.5; .5 MG/3ML; MG/3ML
3 SOLUTION RESPIRATORY (INHALATION) ONCE AS NEEDED
Status: COMPLETED | OUTPATIENT
Start: 2023-04-27 | End: 2023-04-27

## 2023-04-27 RX ORDER — ONDANSETRON 2 MG/ML
4 INJECTION INTRAMUSCULAR; INTRAVENOUS ONCE AS NEEDED
Status: DISCONTINUED | OUTPATIENT
Start: 2023-04-27 | End: 2023-04-27 | Stop reason: HOSPADM

## 2023-04-27 RX ADMIN — FENTANYL CITRATE 100 MCG: 50 INJECTION, SOLUTION INTRAMUSCULAR; INTRAVENOUS at 07:15

## 2023-04-27 RX ADMIN — ROPIVACAINE HYDROCHLORIDE 25 ML: 5 INJECTION EPIDURAL; INFILTRATION; PERINEURAL at 07:15

## 2023-04-27 RX ADMIN — SODIUM CHLORIDE 20 ML/HR: 9 INJECTION, SOLUTION INTRAVENOUS at 06:36

## 2023-04-27 RX ADMIN — HYDROCODONE BITARTRATE AND ACETAMINOPHEN 1 TABLET: 5; 325 TABLET ORAL at 12:13

## 2023-04-27 RX ADMIN — EPHEDRINE SULFATE 5 MG: 5 INJECTION INTRAVENOUS at 07:55

## 2023-04-27 RX ADMIN — Medication 100 MCG: at 08:05

## 2023-04-27 RX ADMIN — Medication 3 G: at 07:39

## 2023-04-27 RX ADMIN — PROPOFOL 80 MG: 10 INJECTION, EMULSION INTRAVENOUS at 07:39

## 2023-04-27 RX ADMIN — Medication 100 MCG: at 08:44

## 2023-04-27 RX ADMIN — DEXMEDETOMIDINE HYDROCHLORIDE 10 MCG: 100 INJECTION, SOLUTION INTRAVENOUS at 07:48

## 2023-04-27 RX ADMIN — IPRATROPIUM BROMIDE AND ALBUTEROL SULFATE 3 ML: .5; 3 SOLUTION RESPIRATORY (INHALATION) at 10:58

## 2023-04-27 RX ADMIN — Medication 100 MCG: at 09:30

## 2023-04-27 RX ADMIN — Medication 100 MCG: at 08:55

## 2023-04-27 RX ADMIN — EPHEDRINE SULFATE 5 MG: 5 INJECTION INTRAVENOUS at 07:43

## 2023-04-27 RX ADMIN — DEXAMETHASONE SODIUM PHOSPHATE 4 MG: 4 INJECTION, SOLUTION INTRAMUSCULAR; INTRAVENOUS at 07:15

## 2023-04-27 RX ADMIN — EPHEDRINE SULFATE 5 MG: 5 INJECTION INTRAVENOUS at 07:34

## 2023-04-27 RX ADMIN — DEXMEDETOMIDINE HYDROCHLORIDE 10 MCG: 100 INJECTION, SOLUTION INTRAVENOUS at 07:39

## 2023-04-27 RX ADMIN — HEPARIN SODIUM 5000 UNITS: 1000 INJECTION INTRAVENOUS; SUBCUTANEOUS at 08:37

## 2023-04-27 RX ADMIN — PROPOFOL INJECTABLE EMULSION 125 MCG/KG/MIN: 10 INJECTION, EMULSION INTRAVENOUS at 07:39

## 2023-04-27 RX ADMIN — PROPOFOL 40 MG: 10 INJECTION, EMULSION INTRAVENOUS at 07:48

## 2023-04-27 RX ADMIN — Medication 100 MCG: at 09:25

## 2023-04-27 RX ADMIN — Medication 100 MCG: at 09:00

## 2023-04-27 RX ADMIN — MIDAZOLAM 2 MG: 1 INJECTION INTRAMUSCULAR; INTRAVENOUS at 07:15

## 2023-04-27 RX ADMIN — EPHEDRINE SULFATE 5 MG: 5 INJECTION INTRAVENOUS at 07:59

## 2023-04-27 NOTE — DISCHARGE INSTRUCTIONS
Wound care: no showers for 2 days post op.  You may shower starting on 4/29/23.  No soaking or submerging incisions under water for 2 weeks post op.  Skin glue is over your incisions.  This will begin peeling away about 5-7 days after your procedure and you may completely remove it once it begins peeling.      Pain: take tylenol over the counter (follow instructions on the bottle) and a stronger pain medication has been sent to your pharmacy for you to take as needed.     If any fever (>101.5 F), purulent drainage from wound, or redness around wound call our office at [phone].     Please use Incentive Spirometer hourly while awake today

## 2023-04-27 NOTE — INTERVAL H&P NOTE
H&P reviewed. The patient was examined and there are no changes to the H&P.      Dodie Hernandez MD  Vascular Surgery  [facility]  O: [phone]  F: [phone]

## 2023-04-27 NOTE — OP NOTE
OPERATIVE NOTE  Patient Name: Gerry Torres  Patient MRN: [id number]  Patient : 1950    Service date: 2023    Surgeon: Dodie Hernandez MD  Assistant: Stephy Cook  Anesthesia: Anesthesiologist: Hector Oliver MD  CRNA: Birgit Bautista CRNA    Pre-operative diagnosis: ESRD  Post-operative diagnosis: Same    Procedure: left arm av graft (4-7 mm tapered PTFE graft)    Findings: Good thrill in the graft at conclusion of the procedure with intact perfusion to the hand    Estimated Blood Loss:  <50cc    Complications: none immediate    Counts: reported as correct and conclusion of procedure    Condition of Patient at Transfer: good    Indication for procedure: ESRD on hemodialysis    Operative Summary:  After a full discussion of the procedure, its indications and potential risks in the pre-operative area. the patient was taken back to the operating room. A timeout was performed with all OR staff present and in agreement prior to procedure start, including confirmation of the correct site and procedure.  Once adequate anesthesia was obtained, the patient's left arm was prepped and draped in the usual sterile fashion.        We made a transverse incision at the antecubital fossa.  We dissected down to the aponeurosis of the biceps tendon.  We sharply opened the aponeurosis and dissected the brachial artery free circumferentially proximally and distally.  The median nerve was identified and preserved.  We then made a longitudinal incision in the upper arm.  We carried our dissection down through the fascia and exposed the proximal brachial vein.  We then tunneled a 4-7 mm tapered graft subcutaneously in the left arm with a Chayito-Wick tunneler.  The patient was given 5000 units of heparin IV.  We controlled the vein with clamps and performed a longitudinal venotomy.  The vein was flushed with heparinized saline proximally and distally.  We then performed a circumferential anastomosis with a 5-0 HS 7  Prolene.  Clamps were released and the graft was flushed.  There was no evidence of leak.  Similarly, we controlled the brachial artery with vessel loops.  We performed an arteriotomy and the artery was flushed proximally and distally with heparinized saline.  We then performed a running circumferential anastomosis with HS 7 Prolene.  Clamps were released and there was an excellent thrill.  There were doppler signals at the wrist in the ulnar and radial arteries.  The hand was warm and well perfused.  We then irrigated the incisions and closed in layers with 2-0 vicryl,  3-0 vicryl and 4-0 vicryl.   Dermabond was applied to the skin.      Dodie Hernandez MD  Vascular Surgery  [facility]  O: [phone]  F: [phone]

## 2023-04-27 NOTE — ANESTHESIA POSTPROCEDURE EVALUATION
Patient: Gerry Torres    Procedure Summary     Date: 04/27/23 Room / Location: [facility] OR  / [facility] MAIN OR    Anesthesia Start: 0729 Anesthesia Stop: 1007    Procedure: ARTERIOVENOUS GRAFT (Left: Arm Upper) Diagnosis:       End stage renal disease      (End stage renal disease (HCC) [N18.6])    Surgeons: Dodie Hernandez MD Provider: Hector Oliver MD    Anesthesia Type: MAC, regional ASA Status: 4          Anesthesia Type: MAC, regional    Vitals  Vitals Value Taken Time   /58 04/27/23 1050   Temp 97.6 °F (36.4 °C) 04/27/23 1044   Pulse 75 04/27/23 1050   Resp 20 04/27/23 1050   SpO2 89 % 04/27/23 1050           Post Anesthesia Care and Evaluation    Patient location during evaluation: PACU  Patient participation: complete - patient participated  Level of consciousness: awake  Pain scale: See nurse's notes for pain score.  Pain management: adequate    Airway patency: patent  Anesthetic complications: No anesthetic complications  PONV Status: none  Cardiovascular status: acceptable  Respiratory status: acceptable and spontaneous ventilation  Hydration status: acceptable    Comments: Patient seen and examined postoperatively; vital signs stable; SpO2 greater than or equal to 90%; cardiopulmonary status stable; nausea/vomiting adequately controlled; pain adequately controlled; no apparent anesthesia complications; patient discharged from anesthesia care when discharge criteria were met

## 2023-04-27 NOTE — ANESTHESIA PREPROCEDURE EVALUATION
Anesthesia Evaluation     Patient summary reviewed and Nursing notes reviewed   NPO Solid Status: > 8 hours  NPO Liquid Status: > 8 hours           Airway   Mallampati: II  TM distance: >3 FB  Neck ROM: full  No difficulty expected  Dental - normal exam     Pulmonary - normal exam   (+) sleep apnea on CPAP,   Cardiovascular - normal exam    (+) hypertension, hyperlipidemia,       Neuro/Psych- negative ROS  GI/Hepatic/Renal/Endo    (+)   renal disease dialysis and ESRD, diabetes mellitus type 2,     Musculoskeletal (-) negative ROS    Abdominal  - normal exam    Bowel sounds: normal.   Substance History - negative use     OB/GYN negative ob/gyn ROS         Other                        Anesthesia Plan    ASA 4     MAC and regional   total IV anesthesia  intravenous induction     Anesthetic plan, risks, benefits, and alternatives have been provided, discussed and informed consent has been obtained with: patient.  Pre-procedure education provided  Plan discussed with CRNA.        CODE STATUS:

## 2023-05-01 RX ORDER — MIDAZOLAM HYDROCHLORIDE 1 MG/ML
INJECTION INTRAMUSCULAR; INTRAVENOUS
Status: DISCONTINUED | OUTPATIENT
Start: 2023-04-27 | End: 2023-05-01 | Stop reason: SURG

## 2023-05-01 RX ORDER — DEXAMETHASONE SODIUM PHOSPHATE 4 MG/ML
INJECTION, SOLUTION INTRA-ARTICULAR; INTRALESIONAL; INTRAMUSCULAR; INTRAVENOUS; SOFT TISSUE
Status: DISCONTINUED | OUTPATIENT
Start: 2023-04-27 | End: 2023-05-01 | Stop reason: SURG

## 2023-05-01 RX ORDER — FENTANYL CITRATE 50 UG/ML
INJECTION, SOLUTION INTRAMUSCULAR; INTRAVENOUS
Status: DISCONTINUED | OUTPATIENT
Start: 2023-04-27 | End: 2023-05-01 | Stop reason: SURG

## 2023-05-01 RX ORDER — ROPIVACAINE HYDROCHLORIDE 5 MG/ML
INJECTION, SOLUTION EPIDURAL; INFILTRATION; PERINEURAL
Status: DISCONTINUED | OUTPATIENT
Start: 2023-04-27 | End: 2023-05-01 | Stop reason: SURG

## 2023-05-01 NOTE — ADDENDUM NOTE
Addendum  created 05/01/23 1923 by Hector Oliver MD    Child order released for a procedure order, Clinical Note Signed, Intraprocedure Blocks edited, SmartForm saved

## 2023-05-01 NOTE — ANESTHESIA PROCEDURE NOTES
Peripheral Block    Pre-sedation assessment completed: 4/27/2023 7:15 AM    Patient reassessed immediately prior to procedure    Patient location during procedure: pre-op  Start time: 4/27/2023 7:15 AM  Stop time: 4/27/2023 7:20 AM  Reason for block: at surgeon's request and primary anesthetic  Performed by  Anesthesiologist: Hector Oliver MD  Preanesthetic Checklist  Completed: patient identified, IV checked, site marked, risks and benefits discussed, surgical consent, monitors and equipment checked, pre-op evaluation and timeout performed  Prep:  Pt Position: supine  Sterile barriers:cap, gloves, mask and washed/disinfected hands  Prep: ChloraPrep  Patient monitoring: blood pressure monitoring, continuous pulse oximetry and EKG  Procedure    Sedation: yes    Guidance:ultrasound guided    ULTRASOUND INTERPRETATION.  Using ultrasound guidance a 20 G gauge needle was placed in close proximity to the nerve, at which point, under ultrasound guidance anesthetic was injected in the area of the nerve and spread of the anesthesia was seen on ultrasound in close proximity thereto.  There were no abnormalities seen on ultrasound; a digital image was taken; and the patient tolerated the procedure with no complications. Images:still images obtained, printed/placed on chart    Laterality:left  Block Type:interscalene  Injection Technique:single-shot  Needle Type:echogenic  Needle Gauge:20 G  Resistance on Injection: none  Sedation medications used: midazolam (VERSED) injection - Intravenous   2 mg - 4/27/2023 7:15:00 AM  fentaNYL citrate (PF) (SUBLIMAZE) injection - Intravenous   100 mcg - 4/27/2023 7:15:00 AM  Medications Used: dexamethasone (DECADRON) injection - Injection   4 mg - 4/27/2023 7:15:00 AM  ropivacaine (NAROPIN) 0.5 % injection - Injection   25 mL - 4/27/2023 7:15:00 AM      Post Assessment  Injection Assessment: negative aspiration for heme, no paresthesia on injection and incremental injection  Patient  Tolerance:comfortable throughout block  Complications:no

## 2023-05-25 ENCOUNTER — APPOINTMENT (OUTPATIENT)
Dept: VASCULAR SURGERY | Facility: HOSPITAL | Age: 73
End: 2023-05-25
Payer: MEDICARE

## 2023-05-25 PROCEDURE — G0463 HOSPITAL OUTPT CLINIC VISIT: HCPCS

## 2023-05-26 ENCOUNTER — TRANSCRIBE ORDERS (OUTPATIENT)
Dept: ADMINISTRATIVE | Facility: HOSPITAL | Age: 73
End: 2023-05-26

## 2023-05-26 DIAGNOSIS — Z99.2 ENCOUNTER FOR EXTRACORPOREAL DIALYSIS: ICD-10-CM

## 2023-05-26 DIAGNOSIS — N18.6 END STAGE RENAL DISEASE: Primary | ICD-10-CM

## 2023-07-26 ENCOUNTER — TRANSCRIBE ORDERS (OUTPATIENT)
Dept: ADMINISTRATIVE | Facility: HOSPITAL | Age: 73
End: 2023-07-26
Payer: MEDICARE

## 2023-07-26 DIAGNOSIS — Z99.2 ENCOUNTER FOR EXTRACORPOREAL DIALYSIS: ICD-10-CM

## 2023-07-26 DIAGNOSIS — N18.6 END STAGE RENAL DISEASE: Primary | ICD-10-CM

## 2023-08-09 ENCOUNTER — OFFICE (OUTPATIENT)
Dept: URBAN - METROPOLITAN AREA CLINIC 64 | Facility: CLINIC | Age: 73
End: 2023-08-09

## 2023-08-09 VITALS
DIASTOLIC BLOOD PRESSURE: 78 MMHG | HEIGHT: 72 IN | WEIGHT: 261 LBS | SYSTOLIC BLOOD PRESSURE: 170 MMHG | HEART RATE: 64 BPM

## 2023-08-09 DIAGNOSIS — D3A.8 OTHER BENIGN NEUROENDOCRINE TUMORS: ICD-10-CM

## 2023-08-09 DIAGNOSIS — K31.84 GASTROPARESIS: ICD-10-CM

## 2023-08-09 PROCEDURE — 99214 OFFICE O/P EST MOD 30 MIN: CPT | Performed by: INTERNAL MEDICINE

## 2023-08-21 ENCOUNTER — INPATIENT HOSPITAL (OUTPATIENT)
Dept: URBAN - METROPOLITAN AREA HOSPITAL 76 | Facility: HOSPITAL | Age: 73
End: 2023-08-21

## 2023-08-21 DIAGNOSIS — E11.43 TYPE 2 DIABETES MELLITUS WITH DIABETIC AUTONOMIC (POLY)NEURO: ICD-10-CM

## 2023-08-21 DIAGNOSIS — R10.11 RIGHT UPPER QUADRANT PAIN: ICD-10-CM

## 2023-08-21 DIAGNOSIS — K31.84 GASTROPARESIS: ICD-10-CM

## 2023-08-21 DIAGNOSIS — R11.0 NAUSEA: ICD-10-CM

## 2023-08-21 PROCEDURE — 99222 1ST HOSP IP/OBS MODERATE 55: CPT | Mod: FS

## 2023-08-22 ENCOUNTER — INPATIENT HOSPITAL (OUTPATIENT)
Dept: URBAN - METROPOLITAN AREA HOSPITAL 76 | Facility: HOSPITAL | Age: 73
End: 2023-08-22

## 2023-08-22 DIAGNOSIS — E11.43 TYPE 2 DIABETES MELLITUS WITH DIABETIC AUTONOMIC (POLY)NEURO: ICD-10-CM

## 2023-08-22 DIAGNOSIS — R11.0 NAUSEA: ICD-10-CM

## 2023-08-22 DIAGNOSIS — K80.20 CALCULUS OF GALLBLADDER WITHOUT CHOLECYSTITIS WITHOUT OBSTRU: ICD-10-CM

## 2023-08-22 DIAGNOSIS — R10.11 RIGHT UPPER QUADRANT PAIN: ICD-10-CM

## 2023-08-22 DIAGNOSIS — K31.84 GASTROPARESIS: ICD-10-CM

## 2023-08-22 PROCEDURE — 99232 SBSQ HOSP IP/OBS MODERATE 35: CPT | Mod: FS

## 2023-10-11 NOTE — PROGRESS NOTES
Encounter Date:04/06/2023      Patient ID: Gerry Torres is a 73 y.o. male.    Chief Complaint   Patient presents with    Follow-up     6mth           History of Present Illness    Gerry Torres is a 72 y.o. male with a history of diabetes, hypertension, hyperlipidemia, pancreatic cancer status post Whipple procedure and splenectomy and partial nephrectomy in 2022, end-stage renal disease who presents for follow-up.  He has been doing well from a cardiac perspective.  Denies any chest pain or shortness of breath.  No dizziness or lightheadedness.  No headaches or visual disturbances.  His blood pressure has been up and down on dialysis days.  Does not check his blood pressure regularly at home.  I have cleaned up his medication list in clinic today as there was a lot of discrepancies.  A      Previous cardiac work-up includes a stress test in January 2023 done at [facility] which was negative for ischemia  Results for orders placed during the hospital encounter of 03/20/23    Adult Transthoracic Echo Complete w/ Color, Spectral and Contrast if Necessary Per Protocol    Interpretation Summary    Left ventricular systolic function is normal. Calculated left ventricular EF = 57%    Left ventricular wall thickness is consistent with mild concentric hypertrophy.    The right atrial cavity is mild to moderately  dilated.    There is calcification of the aortic valve.    Abnormal mitral valve structure consistent with dilated annulus.    There is a trivial pericardial effusion.    Transthoracic echocardiography reveals EF of 57%.  Trivial to small pericardial effusion noted.  Mild MR and mild TR noted.  Moderate left atrial enlargement.    Electronically signed by Tramaine Pérez MD, 03/22/23, 12:08 PM EDT.        The following portions of the patient's history were reviewed and updated as appropriate: allergies, current medications, past family history, past medical history, past social history,  past surgical history, and problem list.    Review of Systems   Constitutional: Negative for malaise/fatigue.   Cardiovascular:  Positive for leg swelling and palpitations. Negative for chest pain and dyspnea on exertion.   Respiratory:  Positive for shortness of breath. Negative for cough.    Gastrointestinal:  Negative for abdominal pain, nausea and vomiting.   Neurological:  Negative for dizziness, focal weakness, headaches, light-headedness and numbness.   All other systems reviewed and are negative.        Current Outpatient Medications:     amLODIPine (NORVASC) 10 MG tablet, Take 1 tablet by mouth Daily. TAKE PREOP, Disp: , Rfl:     calcitriol (ROCALTROL) 0.5 MCG capsule, Take 1 capsule by mouth 3 (Three) Times a Week. Monday, Wednesday and Friday, Disp: , Rfl:     Cholecalciferol (VITAMIN D3) 2000 units tablet, Take 1 tablet by mouth Daily. LAST DOSE 4/20, Disp: , Rfl:     cyanocobalamin 1000 MCG/ML injection, Inject 1 mL into the appropriate muscle as directed by prescriber Every 30 (Thirty) Days., Disp: , Rfl:     dicyclomine (BENTYL) 10 MG capsule, Take 1 capsule by mouth 4 (Four) Times a Day As Needed. Take preop, Disp: , Rfl:     doxazosin (CARDURA) 8 MG tablet, Take 1 tablet by mouth 2 (Two) Times a Day. TAKE PREOP, Disp: , Rfl:     HYDROcodone-acetaminophen (NORCO)  MG per tablet, Take 1 tablet by mouth 4 (Four) Times a Day As Needed for Moderate Pain. Take preop if needed, Disp: , Rfl:     ibandronate (BONIVA) 150 MG tablet, Take 1 tablet by mouth Every 30 (Thirty) Days. NONE AM DOS, Disp: , Rfl:     Insulin Lispro (HUMALOG IJ), Inject 13 Units as directed Every Evening., Disp: , Rfl:     Insulin Lispro (humaLOG) 100 UNIT/ML injection, Inject 9 Units under the skin into the appropriate area as directed 2 (Two) Times a Day. Bkft and lunch  none preop, Disp: , Rfl:     lanreotide acetate (SOMATULINE) 60 MG/0.2ML solution injection, Inject 0.2 mL under the skin into the appropriate area as directed  Every 30 (Thirty) Days., Disp: , Rfl:     levocetirizine (XYZAL) 5 MG tablet, Take 1 tablet by mouth Every Evening., Disp: , Rfl:     losartan (Cozaar) 100 MG tablet, Take 1 tablet by mouth Daily., Disp: 90 tablet, Rfl: 3    LUMIGAN 0.01 % ophthalmic drops, Administer 1 drop to both eyes Every Night. Instill 1 drop into both eyes daily at bedtime, Disp: , Rfl: 2    metoclopramide (REGLAN) 10 MG tablet, Take 1 tablet by mouth 4 (Four) Times a Day. Morning and lunch  TAKE PREOP, Disp: , Rfl:     Multiple Vitamins-Minerals (GWENDOLYN MULTIVITAMIN FOR MEN) tablet, Take 1 tablet by mouth Daily. Take one daily  LD 4/20, Disp: , Rfl:     pantoprazole (PROTONIX) 40 MG EC tablet, Take 1 tablet by mouth 2 (Two) Times a Day. Take preop, Disp: , Rfl:     pregabalin (LYRICA) 25 MG capsule, Take 1 capsule by mouth Every Night., Disp: , Rfl:     tamsulosin (FLOMAX) 0.4 MG capsule 24 hr capsule, Take 2 capsules by mouth every night at bedtime., Disp: , Rfl:     vitamin C (ASCORBIC ACID) 500 MG tablet, Take 1 tablet by mouth Daily. LD 4/20, Disp: , Rfl:     furosemide (LASIX) 80 MG tablet, Take 1 tablet by mouth 2 (Two) Times a Day. (Patient not taking: Reported on 10/12/2023), Disp: 60 tablet, Rfl: 0    Insulin Glargine, 2 Unit Dial, (Toujeo Max SoloStar) 300 UNIT/ML solution pen-injector injection, Inject 20 Units under the skin into the appropriate area as directed Daily for 30 days. (Patient taking differently: Inject 50 Units under the skin into the appropriate area as directed Every Night.), Disp: 2 mL, Rfl: 0    No Known Allergies    History reviewed. No pertinent family history.    Past Surgical History:   Procedure Laterality Date    ARTERIOVENOUS FISTULA/SHUNT SURGERY Left 04/27/2023    Procedure: ARTERIOVENOUS GRAFT;  Surgeon: Dodie Hernandez MD;  Location: [facility] MAIN OR;  Service: Vascular;  Laterality: Left;    GALLBLADDER SURGERY  09/2023    [facility]    HERNIA REPAIR  1962    KNEE ARTHROSCOPY Left 1979    NEPHRECTOMY  "PARTIAL Left     10%    SPLENECTOMY      TONSILLECTOMY  1974    VITRECTOMY PARS PLANA Right 10/22/2019    Procedure: 25G VITRECTOMY-ENDOLASER;  Surgeon: Lazarus, Howard S., MD;  [facility];  Service: Ophthalmology    WHIPPLE PROCEDURE         Past Medical History:   Diagnosis Date    B12 deficiency     BPH (benign prostatic hyperplasia)     Cancer 2020    PANCREAS AND KIDNEY    Diabetes mellitus     INSULIN DEPENDANT    End stage renal disease on dialysis     Glaucoma     Hypertension     Peripheral edema     Sleep apnea     CPAP BRING DOS    Stomach disorder     NOT EMPTYING       Social History     Socioeconomic History    Marital status:    Tobacco Use    Smoking status: Former     Packs/day: 1.00     Years: 49.00     Additional pack years: 0.00     Total pack years: 49.00     Types: Cigarettes     Quit date: 2018     Years since quittin.8     Passive exposure: Past    Smokeless tobacco: Never   Vaping Use    Vaping Use: Never used   Substance and Sexual Activity    Alcohol use: Not Currently     Comment: ocassional    Drug use: Yes     Frequency: 3.0 times per week     Types: Marijuana     Comment: occasional gummy  LAST DOSE     Sexual activity: Defer         Procedures      Objective:       Physical Exam    /71 (BP Location: Right arm, Patient Position: Sitting, Cuff Size: Large Adult)   Pulse 67   Ht 182.9 cm (72\")   Wt 117 kg (258 lb 6.4 oz)   SpO2 96%   BMI 35.05 kg/mý   The patient is alert, oriented and in no distress.  Obese    Vital signs as noted above.    Head and neck revealed no carotid bruits or jugular venous distension.  No thyromegaly or lymphadenopathy is present.    Lungs clear.  No wheezing.  Breath sounds are normal bilaterally.    Heart normal first and second heart sounds.  No murmur..  No pericardial rub is present.  No gallop is present.    Abdomen soft and nontender.  No organomegaly is present.    Extremities revealed good peripheral pulses " without any pedal edema.    Skin warm and dry.    Musculoskeletal system is grossly normal.    CNS grossly normal.           Diagnosis Plan   1. Primary hypertension        2. ESRD (end stage renal disease)        3. Hyperlipidemia, unspecified hyperlipidemia type        4. Chronic heart failure with preserved ejection fraction (HFpEF)          LAB RESULTS (LAST 7 DAYS)    CBC        BMP        CMP         BNP        TROPONIN        CoAg        Creatinine Clearance  CrCl cannot be calculated (Patient's most recent lab result is older than the maximum 30 days allowed.).    ABG        Radiology  No radiology results for the last day         Assessment and Plan       Diagnoses and all orders for this visit:    1. Primary hypertension (Primary)    2. ESRD (end stage renal disease)    3. Hyperlipidemia, unspecified hyperlipidemia type    4. Chronic heart failure with preserved ejection fraction (HFpEF)    Other orders  -     losartan (Cozaar) 100 MG tablet; Take 1 tablet by mouth Daily.  Dispense: 90 tablet; Refill: 3             Hypertension  Uncontrolled  Increase losartan to 100 mg    End-stage renal disease  On dialysis Monday Wednesday Friday  Continue with Lasix    Preventative care  Recent lipid panel shows LDL of 70 with a total cholesterol of 131    HFpEF  Appears euvolemic on exam  Not on SGLT2 inhibitor due to end-stage renal disease      Deepti Culver MD

## 2023-10-12 ENCOUNTER — TRANSCRIBE ORDERS (OUTPATIENT)
Dept: ADMINISTRATIVE | Facility: HOSPITAL | Age: 73
End: 2023-10-12
Payer: MEDICARE

## 2023-10-12 ENCOUNTER — OFFICE VISIT (OUTPATIENT)
Dept: CARDIOLOGY | Facility: CLINIC | Age: 73
End: 2023-10-12
Payer: MEDICARE

## 2023-10-12 ENCOUNTER — HOSPITAL ENCOUNTER (OUTPATIENT)
Dept: CARDIOLOGY | Facility: HOSPITAL | Age: 73
Discharge: HOME | End: 2023-10-12
Payer: MEDICARE

## 2023-10-12 ENCOUNTER — APPOINTMENT (OUTPATIENT)
Dept: VASCULAR SURGERY | Facility: HOSPITAL | Age: 73
End: 2023-10-12
Payer: MEDICARE

## 2023-10-12 VITALS
DIASTOLIC BLOOD PRESSURE: 71 MMHG | SYSTOLIC BLOOD PRESSURE: 178 MMHG | WEIGHT: 258.4 LBS | BODY MASS INDEX: 35 KG/M2 | OXYGEN SATURATION: 96 % | HEIGHT: 72 IN | HEART RATE: 67 BPM

## 2023-10-12 DIAGNOSIS — E78.5 HYPERLIPIDEMIA, UNSPECIFIED HYPERLIPIDEMIA TYPE: ICD-10-CM

## 2023-10-12 DIAGNOSIS — N18.6 ESRD (END STAGE RENAL DISEASE): ICD-10-CM

## 2023-10-12 DIAGNOSIS — Z99.2 ENCOUNTER FOR EXTRACORPOREAL DIALYSIS: ICD-10-CM

## 2023-10-12 DIAGNOSIS — I10 PRIMARY HYPERTENSION: Primary | ICD-10-CM

## 2023-10-12 DIAGNOSIS — I50.32 CHRONIC HEART FAILURE WITH PRESERVED EJECTION FRACTION (HFPEF): ICD-10-CM

## 2023-10-12 DIAGNOSIS — N18.6 END STAGE RENAL DISEASE: ICD-10-CM

## 2023-10-12 DIAGNOSIS — N18.6 END STAGE RENAL DISEASE: Primary | ICD-10-CM

## 2023-10-12 LAB
BH CV VAS DIALYSIS ARTERIAL ANASTOMOSIS DIAMETER: 0.59 CM
BH CV VAS DIALYSIS ARTERIAL ANASTOMOSIS EDV: 369 CM/SEC
BH CV VAS DIALYSIS ARTERIAL ANASTOMOSIS PSV: 629 CM/SEC
BH CV VAS DIALYSIS CONDUIT DIST DEPTH: 0.66 CM
BH CV VAS DIALYSIS CONDUIT DIST DIAMETER: 0.71 CM
BH CV VAS DIALYSIS CONDUIT DIST EDV: 93 CM/SEC
BH CV VAS DIALYSIS CONDUIT DIST FLOW VOL: 1474 ML/MIN
BH CV VAS DIALYSIS CONDUIT DIST PSV: 193 CM/SEC
BH CV VAS DIALYSIS CONDUIT MID DEPTH: 0.62 CM
BH CV VAS DIALYSIS CONDUIT MID DIAMETER: 0.68 CM
BH CV VAS DIALYSIS CONDUIT MID EDV: 100 CM/SEC
BH CV VAS DIALYSIS CONDUIT MID FLOW VOL: 1579 ML/MIN
BH CV VAS DIALYSIS CONDUIT MID PSV: 204 CM/SEC
BH CV VAS DIALYSIS CONDUIT PROX DEPTH: 0.54 CM
BH CV VAS DIALYSIS CONDUIT PROX DIAMETER: 0.52 CM
BH CV VAS DIALYSIS CONDUIT PROX EDV: 21 CM/SEC
BH CV VAS DIALYSIS CONDUIT PROX FLOW VOL: 1652 ML/MIN
BH CV VAS DIALYSIS CONDUIT PROX PSV: 491 CM/SEC
BH CV VAS DIALYSIS PRE-INFLOW BRACHIAL DIAMETER: 0.62 CM
BH CV VAS DIALYSIS PRE-INFLOW BRACHIAL EDV: 118 CM/SEC
BH CV VAS DIALYSIS PRE-INFLOW BRACHIAL FLOW VOL: 1376 ML/MIN
BH CV VAS DIALYSIS PRE-INFLOW BRACHIAL PSV: 281 CM/SEC
BH CV VAS DIALYSIS VENOUS ANASTOMOSIS DIAMETER: 0.89 CM
BH CV VAS DIALYSIS VENOUS ANASTOMOSIS EDV: 125 CM/SEC
BH CV VAS DIALYSIS VENOUS ANASTOMOSIS PSV: 257 CM/SEC
BH CV VAS DIALYSIS VENOUS OUTFLOW AXILLARY DIAMETER: 0.91 CM
BH CV VAS DIALYSIS VENOUS OUTFLOW AXILLARY EDV: 97 CM/SEC
BH CV VAS DIALYSIS VENOUS OUTFLOW AXILLARY PSV: 182 CM/SEC

## 2023-10-12 PROCEDURE — 93990 DOPPLER FLOW TESTING: CPT

## 2023-10-12 PROCEDURE — G0463 HOSPITAL OUTPT CLINIC VISIT: HCPCS

## 2023-10-12 RX ORDER — LOSARTAN POTASSIUM 100 MG/1
100 TABLET ORAL DAILY
Qty: 90 TABLET | Refills: 3 | Status: SHIPPED | OUTPATIENT
Start: 2023-10-12 | End: 2023-10-16 | Stop reason: SDUPTHER

## 2023-10-16 ENCOUNTER — TELEPHONE (OUTPATIENT)
Dept: CARDIOLOGY | Facility: CLINIC | Age: 73
End: 2023-10-16
Payer: MEDICARE

## 2023-10-16 RX ORDER — LOSARTAN POTASSIUM 100 MG/1
100 TABLET ORAL DAILY
Qty: 90 TABLET | Refills: 3 | Status: SHIPPED | OUTPATIENT
Start: 2023-10-16

## 2023-10-16 NOTE — TELEPHONE ENCOUNTER
Rx Refill Note  Requested Prescriptions     Pending Prescriptions Disp Refills    losartan (Cozaar) 100 MG tablet 90 tablet 3     Sig: Take 1 tablet by mouth Daily.      Last office visit with prescribing clinician: 10/12/2023   Last telemedicine visit with prescribing clinician: Visit date not found   Next office visit with prescribing clinician: 3/14/2024                         Would you like a call back once the refill request has been completed: [] Yes [] No    If the office needs to give you a call back, can they leave a voicemail: [] Yes [] No    Gail Chopra MA  10/16/23, 10:14 EDT

## 2023-10-16 NOTE — TELEPHONE ENCOUNTER
Caller: YASMEEN LUNDY    Relationship: Emergency Contact      Requested Prescriptions:   Requested Prescriptions     Pending Prescriptions Disp Refills    losartan (Cozaar) 100 MG tablet 90 tablet 3     Sig: Take 1 tablet by mouth Daily.        Pharmacy where request should be sent: [facility] #[id number] - [place], [place] - [address] [phone] [facility] [phone]      Last office visit with prescribing clinician: 10/12/2023   Last telemedicine visit with prescribing clinician: Visit date not found   Next office visit with prescribing clinician: 3/14/2024     Additional details provided by patient: PHARMACY DIDN'T RECEIVE IT ON THE 12TH    Does the patient have less than a 3 day supply:  [x] Yes  [] No    Would you like a call back once the refill request has been completed: [] Yes [x] No    If the office needs to give you a call back, can they leave a voicemail: [] Yes [x] No    Meseret Win Rep   10/16/23 10:00 EDT

## 2024-02-15 ENCOUNTER — OFFICE (OUTPATIENT)
Dept: URBAN - METROPOLITAN AREA CLINIC 64 | Facility: CLINIC | Age: 74
End: 2024-02-15

## 2024-02-15 ENCOUNTER — APPOINTMENT (OUTPATIENT)
Dept: VASCULAR SURGERY | Facility: HOSPITAL | Age: 74
End: 2024-02-15
Payer: MEDICARE

## 2024-02-15 ENCOUNTER — HOSPITAL ENCOUNTER (OUTPATIENT)
Dept: CARDIOLOGY | Facility: HOSPITAL | Age: 74
Discharge: HOME | End: 2024-02-15
Payer: MEDICARE

## 2024-02-15 VITALS
DIASTOLIC BLOOD PRESSURE: 60 MMHG | SYSTOLIC BLOOD PRESSURE: 157 MMHG | HEART RATE: 77 BPM | HEIGHT: 72 IN | WEIGHT: 267 LBS

## 2024-02-15 DIAGNOSIS — Z99.2 ENCOUNTER FOR EXTRACORPOREAL DIALYSIS: ICD-10-CM

## 2024-02-15 DIAGNOSIS — D3A.8 OTHER BENIGN NEUROENDOCRINE TUMORS: ICD-10-CM

## 2024-02-15 DIAGNOSIS — N18.6 END STAGE RENAL DISEASE: ICD-10-CM

## 2024-02-15 LAB
BH CV VAS DIALYSIS ARTERIAL ANASTOMOSIS DIAMETER: 0.6 CM
BH CV VAS DIALYSIS ARTERIAL ANASTOMOSIS EDV: 183 CM/SEC
BH CV VAS DIALYSIS ARTERIAL ANASTOMOSIS PSV: 347 CM/SEC
BH CV VAS DIALYSIS CONDUIT DIST DEPTH: 0.8 CM
BH CV VAS DIALYSIS CONDUIT DIST DIAMETER: 0.65 CM
BH CV VAS DIALYSIS CONDUIT DIST EDV: 79 CM/SEC
BH CV VAS DIALYSIS CONDUIT DIST FLOW VOL: 840 ML/MIN
BH CV VAS DIALYSIS CONDUIT DIST PSV: 150 CM/SEC
BH CV VAS DIALYSIS CONDUIT MID DEPTH: 0.48 CM
BH CV VAS DIALYSIS CONDUIT MID DIAMETER: 0.65 CM
BH CV VAS DIALYSIS CONDUIT MID EDV: 82 CM/SEC
BH CV VAS DIALYSIS CONDUIT MID FLOW VOL: 898 ML/MIN
BH CV VAS DIALYSIS CONDUIT MID PSV: 151 CM/SEC
BH CV VAS DIALYSIS CONDUIT MID/DIST DEPTH: 0.57 CM
BH CV VAS DIALYSIS CONDUIT MID/DIST DIAMETER: 0.78 CM
BH CV VAS DIALYSIS CONDUIT MID/DIST EDV: 101 CM/SEC
BH CV VAS DIALYSIS CONDUIT MID/DIST FLOW VOL: 1660 ML/MIN
BH CV VAS DIALYSIS CONDUIT MID/DIST PSV: 215 CM/SEC
BH CV VAS DIALYSIS CONDUIT PROX DEPTH: 0.51 CM
BH CV VAS DIALYSIS CONDUIT PROX DIAMETER: 0.5 CM
BH CV VAS DIALYSIS CONDUIT PROX EDV: 194 CM/SEC
BH CV VAS DIALYSIS CONDUIT PROX FLOW VOL: 1057 ML/MIN
BH CV VAS DIALYSIS CONDUIT PROX PSV: 385 CM/SEC
BH CV VAS DIALYSIS CONDUIT PROX/MID DEPTH: 0.51 CM
BH CV VAS DIALYSIS CONDUIT PROX/MID DIAMETER: 0.7 CM
BH CV VAS DIALYSIS CONDUIT PROX/MID EDV: 104 CM/SEC
BH CV VAS DIALYSIS CONDUIT PROX/MID FLOW VOL: 1015 ML/MIN
BH CV VAS DIALYSIS CONDUIT PROX/MID PSV: 220 CM/SEC
BH CV VAS DIALYSIS PRE-INFLOW BRACHIAL DIAMETER: 0.52 CM
BH CV VAS DIALYSIS PRE-INFLOW BRACHIAL EDV: 113 CM/SEC
BH CV VAS DIALYSIS PRE-INFLOW BRACHIAL FLOW VOL: 1006 ML/MIN
BH CV VAS DIALYSIS PRE-INFLOW BRACHIAL PSV: 248 CM/SEC
BH CV VAS DIALYSIS VENOUS ANASTOMOSIS DIAMETER: 0.83 CM
BH CV VAS DIALYSIS VENOUS ANASTOMOSIS EDV: 76 CM/SEC
BH CV VAS DIALYSIS VENOUS ANASTOMOSIS PSV: 149 CM/SEC
BH CV VAS DIALYSIS VENOUS OUTFLOW AXILLARY DIAMETER: 0.6 CM
BH CV VAS DIALYSIS VENOUS OUTFLOW AXILLARY EDV: 163 CM/SEC
BH CV VAS DIALYSIS VENOUS OUTFLOW AXILLARY PSV: 304 CM/SEC
BH CV VAS DIALYSIS VENOUS OUTFLOW SUBCLAVIAN DIAMETER: 0.7 CM
BH CV VAS DIALYSIS VENOUS OUTFLOW SUBCLAVIAN EDV: 14 CM/SEC
BH CV VAS DIALYSIS VENOUS OUTFLOW SUBCLAVIAN PSV: 37 CM/SEC

## 2024-02-15 PROCEDURE — G0463 HOSPITAL OUTPT CLINIC VISIT: HCPCS

## 2024-02-15 PROCEDURE — 99212 OFFICE O/P EST SF 10 MIN: CPT | Performed by: NURSE PRACTITIONER

## 2024-02-15 PROCEDURE — 93990 DOPPLER FLOW TESTING: CPT

## 2024-02-16 ENCOUNTER — TRANSCRIBE ORDERS (OUTPATIENT)
Age: 74
End: 2024-02-16
Payer: MEDICARE

## 2024-02-16 DIAGNOSIS — N18.6 END STAGE RENAL DISEASE: Primary | ICD-10-CM

## 2024-03-13 NOTE — PROGRESS NOTES
Encounter Date:04/06/2023      Patient ID: Gerry Torres is a 73 y.o. male.    Chief Complaint   Patient presents with    Hypertension          History of Present Illness    Gerry Torres is a 72 y.o. male with a history of diabetes, hypertension, hyperlipidemia, pancreatic cancer status post Whipple procedure and splenectomy and partial nephrectomy in 2022, end-stage renal disease on dialysis Monday Wednesday Friday who presents for follow-up.  Continues to do well from a cardiac standpoint.  Denies any chest pain or shortness of breath.  No dizziness or lightheadedness.  He does not keep a check on his blood pressure at home but does get it checked at dialysis.  He cannot tell me those numbers as he does not pay attention.  His losartan was decreased to 50 mg daily.  He does still make urine.    Previous note:  Previous cardiac work-up includes a stress test in January 2023 done at [facility] which was negative for ischemia  Results for orders placed during the hospital encounter of 03/20/23    Adult Transthoracic Echo Complete w/ Color, Spectral and Contrast if Necessary Per Protocol    Interpretation Summary    Left ventricular systolic function is normal. Calculated left ventricular EF = 57%    Left ventricular wall thickness is consistent with mild concentric hypertrophy.    The right atrial cavity is mild to moderately  dilated.    There is calcification of the aortic valve.    Abnormal mitral valve structure consistent with dilated annulus.    There is a trivial pericardial effusion.    Transthoracic echocardiography reveals EF of 57%.  Trivial to small pericardial effusion noted.  Mild MR and mild TR noted.  Moderate left atrial enlargement.    Electronically signed by Tramaine Pérez MD, 03/22/23, 12:08 PM EDT.        The following portions of the patient's history were reviewed and updated as appropriate: allergies, current medications, past family history, past medical history,  past social history, past surgical history, and problem list.    Review of Systems   Constitutional: Negative for malaise/fatigue.   Cardiovascular:  Negative for chest pain, dyspnea on exertion, leg swelling and palpitations.   Respiratory:  Negative for cough and shortness of breath.    Gastrointestinal:  Negative for abdominal pain, nausea and vomiting.   Neurological:  Negative for dizziness, focal weakness, headaches, light-headedness and numbness.   All other systems reviewed and are negative.        Current Outpatient Medications:     amLODIPine (NORVASC) 10 MG tablet, Take 1 tablet by mouth Daily. TAKE PREOP, Disp: , Rfl:     calcitriol (ROCALTROL) 0.5 MCG capsule, Take 1 capsule by mouth 3 (Three) Times a Week. Monday, Wednesday and Friday, Disp: , Rfl:     Cholecalciferol (VITAMIN D3) 2000 units tablet, Take 1 tablet by mouth Daily. LAST DOSE 4/20, Disp: , Rfl:     Creon 22792-772799 units capsule delayed-release particles capsule, Take 2 capsules by mouth 3 (Three) Times a Day With Meals., Disp: , Rfl:     cyanocobalamin 1000 MCG/ML injection, Inject 1 mL into the appropriate muscle as directed by prescriber Every 30 (Thirty) Days., Disp: , Rfl:     doxazosin (CARDURA) 8 MG tablet, Take 1 tablet by mouth 2 (Two) Times a Day. TAKE PREOP, Disp: , Rfl:     furosemide (LASIX) 40 MG tablet, Take 1 tablet by mouth Daily As Needed., Disp: , Rfl:     Insulin Lispro (HUMALOG IJ), Inject 13 Units as directed Every Evening., Disp: , Rfl:     Insulin Lispro (humaLOG) 100 UNIT/ML injection, Inject 9 Units under the skin into the appropriate area as directed 2 (Two) Times a Day. Bkft and lunch  none preop, Disp: , Rfl:     lanreotide acetate (SOMATULINE) 60 MG/0.2ML solution injection, Inject 0.2 mL under the skin into the appropriate area as directed Every 30 (Thirty) Days., Disp: , Rfl:     levocetirizine (XYZAL) 5 MG tablet, Take 1 tablet by mouth Every Evening., Disp: , Rfl:     losartan (Cozaar) 100 MG tablet,  Take 1 tablet by mouth Daily. (Patient taking differently: Take 0.5 tablets by mouth Daily.), Disp: 90 tablet, Rfl: 3    LUMIGAN 0.01 % ophthalmic drops, Administer 1 drop to both eyes Every Night. Instill 1 drop into both eyes daily at bedtime, Disp: , Rfl: 2    metoclopramide (REGLAN) 10 MG tablet, Take 1 tablet by mouth 4 (Four) Times a Day. Morning and lunch  TAKE PREOP, Disp: , Rfl:     Multiple Vitamins-Minerals (GWENDOLNY MULTIVITAMIN FOR MEN) tablet, Take 1 tablet by mouth Daily. Take one daily  LD 4/20, Disp: , Rfl:     pantoprazole (PROTONIX) 40 MG EC tablet, Take 1 tablet by mouth 2 (Two) Times a Day. Take preop, Disp: , Rfl:     pregabalin (LYRICA) 25 MG capsule, Take 2 capsules by mouth Every Night., Disp: , Rfl:     tamsulosin (FLOMAX) 0.4 MG capsule 24 hr capsule, Take 2 capsules by mouth every night at bedtime., Disp: , Rfl:     Velphoro 500 MG chewable tablet, Chew 1 tablet 3 (Three) Times a Day Before Meals. Please see attached for detailed directions, Disp: , Rfl:     vitamin C (ASCORBIC ACID) 500 MG tablet, Take 1 tablet by mouth Daily. LD 4/20, Disp: , Rfl:     Insulin Glargine, 2 Unit Dial, (Toujeo Max SoloStar) 300 UNIT/ML solution pen-injector injection, Inject 20 Units under the skin into the appropriate area as directed Daily for 30 days. (Patient taking differently: Inject 50 Units under the skin into the appropriate area as directed Every Night.), Disp: 2 mL, Rfl: 0    rosuvastatin (CRESTOR) 10 MG tablet, Take 1 tablet by mouth Daily., Disp: 90 tablet, Rfl: 3    No Known Allergies    History reviewed. No pertinent family history.    Past Surgical History:   Procedure Laterality Date    ARTERIOVENOUS FISTULA/SHUNT SURGERY Left 04/27/2023    Procedure: ARTERIOVENOUS GRAFT;  Surgeon: Dodie Hernandez MD;  Location: [facility] MAIN OR;  Service: Vascular;  Laterality: Left;    GALLBLADDER SURGERY  09/2023    [facility]    HERNIA REPAIR  1962    KNEE ARTHROSCOPY Left 1979    NEPHRECTOMY PARTIAL Left      "10%    SPLENECTOMY      TONSILLECTOMY  1974    VITRECTOMY PARS PLANA Right 10/22/2019    Procedure: 25G VITRECTOMY-ENDOLASER;  Surgeon: Lazarus, Howard S., MD;  Location: [facility] MAIN OR;  Service: Ophthalmology    WHIPPLE PROCEDURE         Past Medical History:   Diagnosis Date    B12 deficiency     BPH (benign prostatic hyperplasia)     Cancer 2020    PANCREAS AND KIDNEY    Diabetes mellitus     INSULIN DEPENDANT    End stage renal disease on dialysis     Glaucoma     Hypertension     Peripheral edema     Sleep apnea     CPAP BRING DOS    Stomach disorder     NOT EMPTYING       Social History     Socioeconomic History    Marital status:    Tobacco Use    Smoking status: Former     Current packs/day: 0.00     Average packs/day: 1 pack/day for 49.0 years (49.0 ttl pk-yrs)     Types: Cigarettes     Start date: 1969     Quit date: 2018     Years since quittin.2     Passive exposure: Past    Smokeless tobacco: Never   Vaping Use    Vaping status: Never Used   Substance and Sexual Activity    Alcohol use: Not Currently     Comment: ocassional    Drug use: Yes     Frequency: 3.0 times per week     Types: Marijuana     Comment: occasional gummy  LAST DOSE     Sexual activity: Defer           ECG 12 Lead    Date/Time: 3/14/2024 4:03 PM  Performed by: Deepti Culver MD    Authorized by: Deepti Culver MD  Comparison: compared with previous ECG   Comparison to previous ECG: LVH now present  Rhythm: sinus rhythm  Rate: normal  BPM: 76  Conduction: non-specific intraventricular conduction delay  QRS axis: normal    Clinical impression: non-specific ECG            Objective:       Physical Exam    /63 (BP Location: Right arm, Patient Position: Sitting, Cuff Size: Large Adult)   Pulse 66   Ht 182.9 cm (72\")   Wt 120 kg (265 lb)   SpO2 96%   BMI 35.94 kg/m²   The patient is alert, oriented and in no distress.  Obese    Vital signs as noted above.    Head and neck revealed no carotid bruits or " jugular venous distension.  No thyromegaly or lymphadenopathy is present.    Lungs clear.  No wheezing.  Breath sounds are normal bilaterally.    Heart normal first and second heart sounds.  No murmur..  No pericardial rub is present.  No gallop is present.    Abdomen soft and nontender.  No organomegaly is present.    Extremities revealed good peripheral pulses without any pedal edema.    Skin warm and dry.    Musculoskeletal system is grossly normal.    CNS grossly normal.           Diagnosis Plan   1. Primary hypertension  ECG 12 Lead      2. Hyperlipidemia, unspecified hyperlipidemia type        3. Chronic heart failure with preserved ejection fraction (HFpEF)        4. ESRD (end stage renal disease)        5. End stage renal disease        6. Insulin dependent type 2 diabetes mellitus            LAB RESULTS (LAST 7 DAYS)    CBC        BMP        CMP         BNP        TROPONIN        CoAg        Creatinine Clearance  CrCl cannot be calculated (Patient's most recent lab result is older than the maximum 30 days allowed.).    ABG        Radiology  No radiology results for the last day         Assessment and Plan       Diagnoses and all orders for this visit:    1. Primary hypertension (Primary)  -     ECG 12 Lead    2. Hyperlipidemia, unspecified hyperlipidemia type    3. Chronic heart failure with preserved ejection fraction (HFpEF)    4. ESRD (end stage renal disease)    5. End stage renal disease    6. Insulin dependent type 2 diabetes mellitus    Other orders  -     rosuvastatin (CRESTOR) 10 MG tablet; Take 1 tablet by mouth Daily.  Dispense: 90 tablet; Refill: 3               Hypertension  Uncontrolled  I have asked him to increase losartan to 100 mg on nondialysis days  Have also asked him to keep a log of his blood pressure readings on dialysis days and bring that in for me    End-stage renal disease  On dialysis Monday Wednesday Friday  Continue with Lasix  Still makes urine    Preventative care  Recent  lipid panel shows LDL of 70 with a total cholesterol of 131  Start statin given history of diabetes    HFpEF  Appears euvolemic on exam  Not on SGLT2 inhibitor due to end-stage renal disease    Insulin-dependent diabetes  Currently on insulin with primary managing    Deepti Culver MD

## 2024-03-14 ENCOUNTER — OFFICE VISIT (OUTPATIENT)
Dept: CARDIOLOGY | Facility: CLINIC | Age: 74
End: 2024-03-14
Payer: MEDICARE

## 2024-03-14 VITALS
WEIGHT: 265 LBS | HEART RATE: 66 BPM | OXYGEN SATURATION: 96 % | DIASTOLIC BLOOD PRESSURE: 63 MMHG | SYSTOLIC BLOOD PRESSURE: 163 MMHG | BODY MASS INDEX: 35.89 KG/M2 | HEIGHT: 72 IN

## 2024-03-14 DIAGNOSIS — E78.5 HYPERLIPIDEMIA, UNSPECIFIED HYPERLIPIDEMIA TYPE: ICD-10-CM

## 2024-03-14 DIAGNOSIS — N18.6 END STAGE RENAL DISEASE: ICD-10-CM

## 2024-03-14 DIAGNOSIS — I10 PRIMARY HYPERTENSION: Primary | ICD-10-CM

## 2024-03-14 DIAGNOSIS — I50.32 CHRONIC HEART FAILURE WITH PRESERVED EJECTION FRACTION (HFPEF): ICD-10-CM

## 2024-03-14 DIAGNOSIS — Z79.4 INSULIN DEPENDENT TYPE 2 DIABETES MELLITUS: ICD-10-CM

## 2024-03-14 DIAGNOSIS — E11.9 INSULIN DEPENDENT TYPE 2 DIABETES MELLITUS: ICD-10-CM

## 2024-03-14 DIAGNOSIS — N18.6 ESRD (END STAGE RENAL DISEASE): ICD-10-CM

## 2024-03-14 PROCEDURE — 93000 ELECTROCARDIOGRAM COMPLETE: CPT | Performed by: INTERNAL MEDICINE

## 2024-03-14 PROCEDURE — 3077F SYST BP >= 140 MM HG: CPT | Performed by: INTERNAL MEDICINE

## 2024-03-14 PROCEDURE — 99214 OFFICE O/P EST MOD 30 MIN: CPT | Performed by: INTERNAL MEDICINE

## 2024-03-14 PROCEDURE — 1159F MED LIST DOCD IN RCRD: CPT | Performed by: INTERNAL MEDICINE

## 2024-03-14 PROCEDURE — 3078F DIAST BP <80 MM HG: CPT | Performed by: INTERNAL MEDICINE

## 2024-03-14 PROCEDURE — 1160F RVW MEDS BY RX/DR IN RCRD: CPT | Performed by: INTERNAL MEDICINE

## 2024-03-14 RX ORDER — SUCROFERRIC OXYHYDROXIDE 500 MG/1
1 TABLET, CHEWABLE ORAL
COMMUNITY
Start: 2023-11-20

## 2024-03-14 RX ORDER — PANCRELIPASE 36000; 180000; 114000 [USP'U]/1; [USP'U]/1; [USP'U]/1
72000 CAPSULE, DELAYED RELEASE PELLETS ORAL
COMMUNITY
Start: 2023-11-28

## 2024-03-14 RX ORDER — FUROSEMIDE 40 MG/1
40 TABLET ORAL DAILY PRN
COMMUNITY

## 2024-03-18 RX ORDER — ROSUVASTATIN CALCIUM 10 MG/1
10 TABLET, COATED ORAL DAILY
Qty: 90 TABLET | Refills: 3 | Status: SHIPPED | OUTPATIENT
Start: 2024-03-18

## 2024-06-20 ENCOUNTER — HOSPITAL ENCOUNTER (OUTPATIENT)
Dept: CARDIOLOGY | Facility: HOSPITAL | Age: 74
Discharge: HOME | End: 2024-06-20
Payer: MEDICARE

## 2024-06-20 DIAGNOSIS — N18.6 END STAGE RENAL DISEASE: ICD-10-CM

## 2024-06-20 LAB
BH CV VAS DIALYSIS ARTERIAL ANASTOMOSIS DIAMETER: 0.56 CM
BH CV VAS DIALYSIS ARTERIAL ANASTOMOSIS EDV: 171 CM/SEC
BH CV VAS DIALYSIS ARTERIAL ANASTOMOSIS PSV: 292 CM/SEC
BH CV VAS DIALYSIS CONDUIT DIST DEPTH: 0.92 CM
BH CV VAS DIALYSIS CONDUIT DIST DIAMETER: 0.62 CM
BH CV VAS DIALYSIS CONDUIT DIST EDV: 77 CM/SEC
BH CV VAS DIALYSIS CONDUIT DIST FLOW VOL: 886 ML/MIN
BH CV VAS DIALYSIS CONDUIT DIST PSV: 146 CM/SEC
BH CV VAS DIALYSIS CONDUIT MID DEPTH: 0.54 CM
BH CV VAS DIALYSIS CONDUIT MID DIAMETER: 0.66 CM
BH CV VAS DIALYSIS CONDUIT MID EDV: 105 CM/SEC
BH CV VAS DIALYSIS CONDUIT MID FLOW VOL: 1471 ML/MIN
BH CV VAS DIALYSIS CONDUIT MID PSV: 202 CM/SEC
BH CV VAS DIALYSIS CONDUIT PROX DEPTH: 0.54 CM
BH CV VAS DIALYSIS CONDUIT PROX DIAMETER: 0.64 CM
BH CV VAS DIALYSIS CONDUIT PROX EDV: 67 CM/SEC
BH CV VAS DIALYSIS CONDUIT PROX FLOW VOL: 742 ML/MIN
BH CV VAS DIALYSIS CONDUIT PROX PSV: 182 CM/SEC
BH CV VAS DIALYSIS PRE-INFLOW BRACHIAL DIAMETER: 0.59 CM
BH CV VAS DIALYSIS PRE-INFLOW BRACHIAL EDV: 97 CM/SEC
BH CV VAS DIALYSIS PRE-INFLOW BRACHIAL FLOW VOL: 1456 ML/MIN
BH CV VAS DIALYSIS PRE-INFLOW BRACHIAL PSV: 199 CM/SEC
BH CV VAS DIALYSIS VENOUS ANASTOMOSIS DIAMETER: 0.68 CM
BH CV VAS DIALYSIS VENOUS ANASTOMOSIS EDV: 93 CM/SEC
BH CV VAS DIALYSIS VENOUS ANASTOMOSIS PSV: 176 CM/SEC
BH CV VAS DIALYSIS VENOUS OUTFLOW AXILLARY DIAMETER: 0.42 CM
BH CV VAS DIALYSIS VENOUS OUTFLOW AXILLARY EDV: 288 CM/SEC
BH CV VAS DIALYSIS VENOUS OUTFLOW AXILLARY PSV: 571 CM/SEC
BH CV VAS DIALYSIS VENOUS OUTFLOW SUBCLAVIAN DIAMETER: 1.2 CM
BH CV VAS DIALYSIS VENOUS OUTFLOW SUBCLAVIAN EDV: 28 CM/SEC
BH CV VAS DIALYSIS VENOUS OUTFLOW SUBCLAVIAN PSV: 62 CM/SEC

## 2024-06-20 PROCEDURE — 93990 DOPPLER FLOW TESTING: CPT

## 2024-06-27 ENCOUNTER — OFFICE VISIT (OUTPATIENT)
Age: 74
End: 2024-06-27
Payer: MEDICARE

## 2024-06-27 VITALS
SYSTOLIC BLOOD PRESSURE: 160 MMHG | WEIGHT: 265 LBS | HEIGHT: 72 IN | RESPIRATION RATE: 19 BRPM | HEART RATE: 83 BPM | DIASTOLIC BLOOD PRESSURE: 82 MMHG | OXYGEN SATURATION: 96 % | BODY MASS INDEX: 35.89 KG/M2

## 2024-06-27 DIAGNOSIS — N18.6 ESRD (END STAGE RENAL DISEASE): Primary | ICD-10-CM

## 2024-06-27 NOTE — PROGRESS NOTES
Chief Complaint  Follow up left arm graft    Subjective        Gerry Torres presents to [facility] VASCULAR SURGERY  History of Present Illness  74 y.o. male with a h/o ESRD s/p left arm AV graft placement on 23 returns for post operative follow up with a duplex scan.  No prolonged bleeding or arm swelling.  No tingling, numbness, or weakness of his arm.      Past Medical History:   Diagnosis Date    Acute kidney failure, unspecified     B12 deficiency     BPH (benign prostatic hyperplasia)     Cancer 2020    PANCREAS AND KIDNEY    Diabetes mellitus     INSULIN DEPENDANT    End stage renal disease on dialysis     3/30/2023    Glaucoma     HLD (hyperlipidemia)     Hypertension     Peripheral edema     Sleep apnea     CPAP BRING DOS    Stomach disorder     NOT EMPTYING    Vitamin D deficiency, unspecified        Past Surgical History:   Procedure Laterality Date    ARTERIOVENOUS FISTULA/SHUNT SURGERY Left 2023    Procedure: ARTERIOVENOUS GRAFT;  Surgeon: Dodie Hernandez MD;  Location: [facility] OR;  Service: Vascular;  Laterality: Left;    GALLBLADDER SURGERY  2023    [facility]    HERNIA REPAIR      KNEE ARTHROSCOPY Left     NEPHRECTOMY PARTIAL Left     10%    SPLENECTOMY      TONSILLECTOMY  1974    VITRECTOMY PARS PLANA Right 10/22/2019    Procedure: 25G VITRECTOMY-ENDOLASER;  Surgeon: Lazarus, Howard S., MD;  Location: [facility] OR;  Service: Ophthalmology    WHIPPLE PROCEDURE         History reviewed. No pertinent family history.      Social History     Tobacco Use    Smoking status: Former     Current packs/day: 0.00     Average packs/day: 1 pack/day for 49.0 years (49.0 ttl pk-yrs)     Types: Cigarettes     Start date: 1969     Quit date: 2018     Years since quittin.5     Passive exposure: Past    Smokeless tobacco: Never   Vaping Use    Vaping status: Never Used   Substance Use Topics    Alcohol use: Not Currently     Comment: ocassional    Drug use:  Yes     Frequency: 3.0 times per week     Types: Marijuana     Comment: occasional gummy  LAST DOSE 4/22       Allergies: Patient has no known allergies.    Prior to Admission medications    Medication Sig Start Date End Date Taking? Authorizing Provider   amLODIPine (NORVASC) 10 MG tablet Take 1 tablet by mouth Daily. TAKE PREOP   Yes Dez Ambrose MD   calcitriol (ROCALTROL) 0.5 MCG capsule Take 1 capsule by mouth 3 (Three) Times a Week. Monday, Wednesday and Friday   Yes Dez Ambrose MD   carvedilol (COREG) 12.5 MG tablet Take 1 tablet by mouth 2 (Two) Times a Day.   Yes Dez Ambrose MD   Cholecalciferol (VITAMIN D3) 2000 units tablet Take 1 tablet by mouth Daily. LAST DOSE 4/20   Yes Dez Ambrose MD   Creon 95477-813348 units capsule delayed-release particles capsule Take 2 capsules by mouth 3 (Three) Times a Day With Meals. 11/28/23  Yes Dez Ambrose MD   cyanocobalamin 1000 MCG/ML injection Inject 1 mL into the appropriate muscle as directed by prescriber Every 30 (Thirty) Days.   Yes Dez Ambrose MD   doxazosin (CARDURA) 8 MG tablet Take 1 tablet by mouth 2 (Two) Times a Day. TAKE PREOP   Yes Dez Ambrose MD   furosemide (LASIX) 40 MG tablet Take 1 tablet by mouth Daily As Needed.   Yes Dez Ambrose MD   Insulin Lispro (HUMALOG IJ) Inject 13 Units as directed Every Evening.   Yes Dez Ambrose MD   Insulin Lispro (humaLOG) 100 UNIT/ML injection Inject 9 Units under the skin into the appropriate area as directed 2 (Two) Times a Day. Bkft and lunch  none preop   Yes Dez Ambrose MD   lanreotide acetate (SOMATULINE) 60 MG/0.2ML solution injection Inject 0.2 mL under the skin into the appropriate area as directed Every 30 (Thirty) Days.   Yes Dez Ambrose MD   levocetirizine (XYZAL) 5 MG tablet Take 1 tablet by mouth Every Evening.   Yes Dez Ambrose MD   losartan (Cozaar) 100 MG tablet Take 1 tablet by mouth  "Daily.  Patient taking differently: Take 0.5 tablets by mouth Daily. 10/16/23  Yes Deepti Culver MD   LUMIGAN 0.01 % ophthalmic drops Administer 1 drop to both eyes Every Night. Instill 1 drop into both eyes daily at bedtime 7/8/19  Yes Dez Ambrose MD   metoclopramide (REGLAN) 10 MG tablet Take 1 tablet by mouth 4 (Four) Times a Day. Morning and lunch  TAKE PREOP   Yes Dez Ambrose MD   Multiple Vitamins-Minerals (GWENDOLYN MULTIVITAMIN FOR MEN) tablet Take 1 tablet by mouth Daily. Take one daily  LD 4/20 11/16/15  Yes Dez Ambrose MD   pantoprazole (PROTONIX) 40 MG EC tablet Take 1 tablet by mouth 2 (Two) Times a Day. Take preop   Yes Dez Ambrose MD   pregabalin (LYRICA) 25 MG capsule Take 2 capsules by mouth Every Night.   Yes Dez Ambrose MD   rosuvastatin (CRESTOR) 10 MG tablet Take 1 tablet by mouth Daily. 3/18/24  Yes Deepti Culver MD   tamsulosin (FLOMAX) 0.4 MG capsule 24 hr capsule Take 2 capsules by mouth every night at bedtime.   Yes Dez Ambrose MD   Velphoro 500 MG chewable tablet Chew 1 tablet 3 (Three) Times a Day Before Meals. Please see attached for detailed directions 11/20/23  Yes Dez Ambrose MD   vitamin C (ASCORBIC ACID) 500 MG tablet Take 1 tablet by mouth Daily. LD 4/20   Yes Dez Ambrose MD   furosemide (LASIX) 80 MG tablet Take 1 tablet by mouth 2 (Two) Times a Day.    Dez Ambrose MD   Insulin Glargine, 2 Unit Dial, (Toujeo Max SoloStar) 300 UNIT/ML solution pen-injector injection Inject 20 Units under the skin into the appropriate area as directed Daily for 30 days.  Patient taking differently: Inject 50 Units under the skin into the appropriate area as directed Every Night. 3/25/23 4/27/23  Gabriel Casanova MD         Objective   Vital Signs:  /82 (BP Location: Right arm, Patient Position: Sitting, Cuff Size: Adult)   Pulse 83   Resp 19   Ht 182.9 cm (72\")   Wt 120 kg (265 lb)   SpO2 96%   BMI " "35.94 kg/m²   Estimated body mass index is 35.94 kg/m² as calculated from the following:    Height as of this encounter: 182.9 cm (72\").    Weight as of this encounter: 120 kg (265 lb).       Class 2 Severe Obesity (BMI >=35 and <=39.9). Obesity-related health conditions include the following: hypertension, diabetes mellitus, and dyslipidemias. Obesity is unchanged. BMI is is above average; BMI management plan is completed. We discussed portion control, increasing exercise, and referral to PCP for weight management .      Physical Exam  Vitals reviewed.   Constitutional:       General: He is not in acute distress.     Appearance: Normal appearance. He is obese.   HENT:      Head: Normocephalic and atraumatic.      Right Ear: External ear normal.      Left Ear: External ear normal.      Nose: Nose normal.      Mouth/Throat:      Mouth: Mucous membranes are moist.      Pharynx: Oropharynx is clear.   Eyes:      Extraocular Movements: Extraocular movements intact.      Conjunctiva/sclera: Conjunctivae normal.      Pupils: Pupils are equal, round, and reactive to light.   Cardiovascular:      Rate and Rhythm: Normal rate and regular rhythm.      Pulses:           Carotid pulses are 2+ on the right side and 2+ on the left side.       Radial pulses are 2+ on the right side and 2+ on the left side.      Comments: Palpable thrill over left arm AV graft  Pulmonary:      Comments: Non labored respirations on room air, equal chest rise  Abdominal:      General: Abdomen is flat. There is no distension.      Palpations: Abdomen is soft.   Musculoskeletal:      Cervical back: Normal range of motion. No rigidity.      Right lower leg: No edema.      Left lower leg: No edema.   Skin:     General: Skin is warm and dry.      Capillary Refill: Capillary refill takes less than 2 seconds.   Neurological:      General: No focal deficit present.      Mental Status: He is alert and oriented to person, place, and time.   Psychiatric:        "  Mood and Affect: Mood normal.         Behavior: Behavior normal.        Result Review :    The following data was reviewed by: Dodie Hernandez MD on 06/27/2024:  CMP          5/24/2024    10:32   CMP   Potassium 4.4          Details          This result is from an external source.             CBC          5/7/2024    09:36 5/17/2024    13:47 6/4/2024    09:28   CBC   WBC 7.36     7.10     6.08       RBC 3.32     3.38     3.66       Hemoglobin 10.5     10.5     10.9       Hematocrit 31.9     32.0     34.8       MCV 96.1     94.7     95.1       MCH 31.6     31.1     29.8       MCHC 32.9     32.8     31.3       RDW 15.8     15.5     16.2       Platelets 173     191     188          Details          This result is from an external source.                 Last Echo  Results for orders placed during the hospital encounter of 03/20/23    Adult Transthoracic Echo Complete w/ Color, Spectral and Contrast if Necessary Per Protocol    Interpretation Summary    Left ventricular systolic function is normal. Calculated left ventricular EF = 57%    Left ventricular wall thickness is consistent with mild concentric hypertrophy.    The right atrial cavity is mild to moderately  dilated.    There is calcification of the aortic valve.    Abnormal mitral valve structure consistent with dilated annulus.    There is a trivial pericardial effusion.    Transthoracic echocardiography reveals EF of 57%.  Trivial to small pericardial effusion noted.  Mild MR and mild TR noted.  Moderate left atrial enlargement.    Electronically signed by Tramaine éPrez MD, 03/22/23, 12:08 PM EDT.              Assessment and Plan     Diagnoses and all orders for this visit:    1. ESRD (end stage renal disease) (Primary)  -     Duplex Hemodialysis Access CAR; Future      Gerry Torres is a 74-year-old male status post left arm AV graft placement on April 27, 2023.  He has been using his graft for hemodialysis treatments without issue.  He typically  denies prolonged bleeding, arm swelling, numbness, tingling, and weakness of the hand.  Duplex was performed of the graft on June 20, 2024 which showed adequate flow volumes, adequate diameters, and adequate depth.  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  We discussed a possible fistulogram but since he has preserved flow volumes and is asymptomatic from his venous outflow stenosis, we decided to continue surveillance with a duplex in 3 months.  He will contact us to schedule a fistulagram should be develop arm swelling or prolonged bleeding after his hemodialysis treatments.         Follow Up     Return in about 3 months (around 9/27/2024).  Patient was given instructions and counseling regarding his condition or for health maintenance advice. Please see specific information pulled into the AVS if appropriate.

## 2024-07-21 DIAGNOSIS — I10 PRIMARY HYPERTENSION: Primary | ICD-10-CM

## 2024-07-21 DIAGNOSIS — I50.32 CHRONIC HEART FAILURE WITH PRESERVED EJECTION FRACTION (HFPEF): ICD-10-CM

## 2024-07-22 RX ORDER — LOSARTAN POTASSIUM 100 MG/1
100 TABLET ORAL DAILY
Qty: 90 TABLET | Refills: 3 | Status: SHIPPED | OUTPATIENT
Start: 2024-07-22

## 2024-07-22 NOTE — TELEPHONE ENCOUNTER
Rx Refill Note  Requested Prescriptions     Signed Prescriptions Disp Refills    losartan (COZAAR) 100 MG tablet 90 tablet 3     Sig: Take 1 tablet by mouth Daily.     Authorizing Provider: REX SALINAS     Ordering User: RAFY VERMA      Last office visit with prescribing clinician: 3/14/2024     Next office visit with prescribing clinician: 9/10/2024 - DR.SINGH Rafy Verma MA  07/22/24, 14:23 EDT

## 2024-08-22 ENCOUNTER — OFFICE (OUTPATIENT)
Age: 74
End: 2024-08-22

## 2024-08-22 ENCOUNTER — OFFICE (OUTPATIENT)
Dept: URBAN - METROPOLITAN AREA CLINIC 64 | Facility: CLINIC | Age: 74
End: 2024-08-22

## 2024-08-22 VITALS
DIASTOLIC BLOOD PRESSURE: 58 MMHG | HEIGHT: 72 IN | SYSTOLIC BLOOD PRESSURE: 130 MMHG | WEIGHT: 260 LBS | WEIGHT: 260 LBS | HEIGHT: 72 IN | HEART RATE: 76 BPM | SYSTOLIC BLOOD PRESSURE: 130 MMHG | WEIGHT: 260 LBS | HEIGHT: 72 IN | HEIGHT: 72 IN | DIASTOLIC BLOOD PRESSURE: 58 MMHG | HEART RATE: 76 BPM | SYSTOLIC BLOOD PRESSURE: 130 MMHG | HEIGHT: 72 IN | DIASTOLIC BLOOD PRESSURE: 58 MMHG | SYSTOLIC BLOOD PRESSURE: 130 MMHG | WEIGHT: 260 LBS | DIASTOLIC BLOOD PRESSURE: 58 MMHG | DIASTOLIC BLOOD PRESSURE: 58 MMHG | SYSTOLIC BLOOD PRESSURE: 130 MMHG | DIASTOLIC BLOOD PRESSURE: 58 MMHG | SYSTOLIC BLOOD PRESSURE: 130 MMHG | WEIGHT: 260 LBS | HEART RATE: 76 BPM | HEART RATE: 76 BPM | HEIGHT: 72 IN | HEIGHT: 72 IN | DIASTOLIC BLOOD PRESSURE: 58 MMHG | SYSTOLIC BLOOD PRESSURE: 130 MMHG | HEART RATE: 76 BPM | WEIGHT: 260 LBS | HEART RATE: 76 BPM | HEART RATE: 76 BPM | WEIGHT: 260 LBS

## 2024-08-22 DIAGNOSIS — D3A.8 OTHER BENIGN NEUROENDOCRINE TUMORS: ICD-10-CM

## 2024-08-22 DIAGNOSIS — K31.84 GASTROPARESIS: ICD-10-CM

## 2024-08-22 PROCEDURE — 99214 OFFICE O/P EST MOD 30 MIN: CPT | Performed by: INTERNAL MEDICINE

## 2024-11-17 NOTE — PROGRESS NOTES
Encounter Date:11/26/2024        Patient ID: Gerry Torres is a 74 y.o. male.      Chief Complaint:      History of Present Illness  Gerry Torres is a 74 y.o. male with a history of diabetes, hypertension, hyperlipidemia, pancreatic cancer status post Whipple procedure and splenectomy and partial nephrectomy in 2022, end-stage renal disease on dialysis  who presents for follow-up.      Current cardiac medications include amlodipine.    The following portions of the patient's history were reviewed and updated as appropriate: allergies, current medications, past family history, past medical history, past social history, past surgical history, and problem list.    Review of Systems   Constitutional: Negative for malaise/fatigue.   Cardiovascular:  Positive for leg swelling. Negative for chest pain, dyspnea on exertion and palpitations.   Respiratory:  Negative for cough and shortness of breath.    Gastrointestinal:  Negative for abdominal pain, nausea and vomiting.   Neurological:  Negative for dizziness, focal weakness, headaches, light-headedness and numbness.   All other systems reviewed and are negative.        Current Outpatient Medications:     acetaminophen (TYLENOL) 325 MG tablet, Take  by mouth., Disp: , Rfl:     amLODIPine (NORVASC) 10 MG tablet, Take 1 tablet by mouth Daily. TAKE PREOP, Disp: , Rfl:     B-D ULTRAFINE III SHORT PEN 31G X 8 MM misc, , Disp: , Rfl:     calcitriol (ROCALTROL) 0.5 MCG capsule, Take 1 capsule by mouth 3 (Three) Times a Week. Monday, Wednesday and Friday, Disp: , Rfl:     calcium acetate (PHOS BINDER,) 667 MG capsule capsule, Take 1 capsule by mouth 3 (Three) Times a Day., Disp: , Rfl:     Cholecalciferol (VITAMIN D3) 2000 units tablet, Take 1 tablet by mouth Daily. LAST DOSE 4/20, Disp: , Rfl:     cyanocobalamin 1000 MCG/ML injection, Inject 1 mL into the appropriate muscle as directed by prescriber Every 30 (Thirty) Days., Disp: , Rfl:     doxazosin (CARDURA) 8 MG tablet,  Take 1 tablet by mouth 2 (Two) Times a Day. TAKE PREOP, Disp: , Rfl:     famotidine (PEPCID) 20 MG tablet, Take 1 tablet by mouth 2 (Two) Times a Day., Disp: , Rfl:     Ferric Citrate 1  MG(Fe) tablet, Take 1 tablet by mouth Daily., Disp: , Rfl:     furosemide (LASIX) 40 MG tablet, Take 1 tablet by mouth Daily As Needed., Disp: , Rfl:     hydrALAZINE (APRESOLINE) 100 MG tablet, Take 1 tablet by mouth 3 (Three) Times a Day., Disp: , Rfl:     hydrOXYzine (ATARAX) 50 MG tablet, Take 1 tablet by mouth Every 8 (Eight) Hours As Needed for Itching or Anxiety., Disp: , Rfl:     Insulin Glargine, 2 Unit Dial, (Toujeo Max SoloStar) 300 UNIT/ML solution pen-injector injection, Inject 20 Units under the skin into the appropriate area as directed Daily for 30 days. (Patient taking differently: Inject 50 Units under the skin into the appropriate area as directed Every Night.), Disp: 2 mL, Rfl: 0    Insulin Lispro (HUMALOG IJ), Inject 13 Units as directed Every Evening., Disp: , Rfl:     Insulin Lispro (humaLOG) 100 UNIT/ML injection, Inject 9 Units under the skin into the appropriate area as directed 2 (Two) Times a Day. Bkft and lunch  none preop, Disp: , Rfl:     ipratropium-albuterol (DUO-NEB) 0.5-2.5 mg/3 ml nebulizer, Inhale 3 mL., Disp: , Rfl:     lanreotide acetate (SOMATULINE) 60 MG/0.2ML solution injection, Inject 0.2 mL under the skin into the appropriate area as directed Every 30 (Thirty) Days., Disp: , Rfl:     lidocaine (LIDODERM) 5 %, , Disp: , Rfl:     LUMIGAN 0.01 % ophthalmic drops, Administer 1 drop to both eyes Every Night. Instill 1 drop into both eyes daily at bedtime, Disp: , Rfl: 2    metoclopramide (REGLAN) 10 MG tablet, Take 1 tablet by mouth 4 (Four) Times a Day. Morning and lunch  TAKE PREOP, Disp: , Rfl:     Multiple Vitamins-Minerals (GWENDOLYN MULTIVITAMIN FOR MEN) tablet, Take 1 tablet by mouth Daily. Take one daily  LD 4/20, Disp: , Rfl:     pancrelipase, Lip-Prot-Amyl, (Creon) 95307-80021 units  capsule delayed-release particles capsule, Take 1 capsule by mouth 3 (Three) Times a Day With Meals., Disp: , Rfl:     rosuvastatin (CRESTOR) 10 MG tablet, Take 1 tablet by mouth Daily., Disp: 90 tablet, Rfl: 3    tamsulosin (FLOMAX) 0.4 MG capsule 24 hr capsule, Take 2 capsules by mouth every night at bedtime., Disp: , Rfl:     Velphoro 500 MG chewable tablet, Chew 1 tablet 3 (Three) Times a Day Before Meals. Please see attached for detailed directions, Disp: , Rfl:     vitamin C (ASCORBIC ACID) 500 MG tablet, Take 1 tablet by mouth Daily. LD 4/20, Disp: , Rfl:     Current outpatient and discharge medications have been reconciled for the patient.  Reviewed by: Trav Matt MD       No Known Allergies    History reviewed. No pertinent family history.    Past Surgical History:   Procedure Laterality Date    ARTERIOVENOUS FISTULA/SHUNT SURGERY Left 04/27/2023    Procedure: ARTERIOVENOUS GRAFT;  Surgeon: Dodie Hernandez MD;  Location: [facility] OR;  Service: Vascular;  Laterality: Left;    GALLBLADDER SURGERY  09/2023    [facility]    HERNIA REPAIR  1962    KNEE ARTHROSCOPY Left 1979    NEPHRECTOMY PARTIAL Left     10%    SPLENECTOMY      TONSILLECTOMY  1974    VITRECTOMY PARS PLANA Right 10/22/2019    Procedure: 25G VITRECTOMY-ENDOLASER;  Surgeon: Lazarus, Howard S., MD;  Location: [facility] OR;  Service: Ophthalmology    WHIPPLE PROCEDURE         Past Medical History:   Diagnosis Date    Acute kidney failure, unspecified     B12 deficiency     BPH (benign prostatic hyperplasia)     Cancer 01/2020    PANCREAS AND KIDNEY    Diabetes mellitus     INSULIN DEPENDANT    End stage renal disease on dialysis     3/30/2023    Glaucoma     HLD (hyperlipidemia)     Hypertension     Peripheral edema     Sleep apnea     CPAP BRING DOS    Stomach disorder     NOT EMPTYING    Vitamin D deficiency, unspecified        History reviewed. No pertinent family history.    Social History     Socioeconomic History    Marital status:  "   Tobacco Use    Smoking status: Former     Current packs/day: 0.00     Average packs/day: 1 pack/day for 49.0 years (49.0 ttl pk-yrs)     Types: Cigarettes     Start date: 1969     Quit date: 2018     Years since quittin.9     Passive exposure: Past    Smokeless tobacco: Never   Vaping Use    Vaping status: Never Used   Substance and Sexual Activity    Alcohol use: Not Currently     Comment: ocassional    Drug use: Yes     Frequency: 3.0 times per week     Types: Marijuana     Comment: occasional gummy  LAST DOSE     Sexual activity: Defer               Objective:       Physical Exam    /62 (BP Location: Right arm, Patient Position: Sitting, Cuff Size: Large Adult)   Pulse 80   Ht 182.9 cm (72\")   Wt 102 kg (225 lb)   SpO2 93%   BMI 30.52 kg/m²   The patient is alert, oriented and in no distress.    Vital signs as noted above.    Head and neck revealed no carotid bruits or jugular venous distension.  No thyromegaly or lymphadenopathy is present.    Lungs clear.  No wheezing.  Breath sounds are normal bilaterally.    Heart normal first and second heart sounds.  No murmur..  No pericardial rub is present.  No gallop is present.    Abdomen soft and nontender.  No organomegaly is present.    Extremities revealed good peripheral pulses without any pedal edema.    Skin warm and dry.    Musculoskeletal system is grossly normal.    CNS grossly normal.           Diagnosis Plan   1. Primary hypertension        2. Chronic heart failure with preserved ejection fraction (HFpEF)        3. ESRD (end stage renal disease)        4. End stage renal disease        5. Hyperlipidemia, unspecified hyperlipidemia type        6. Type 1 diabetes mellitus with hyperglycemia        LAB RESULTS (LAST 7 DAYS)    CBC        BMP        CMP         BNP        TROPONIN        CoAg        Creatinine Clearance  CrCl cannot be calculated (Patient's most recent lab result is older than the maximum 30 days " allowed.).    ABG        Radiology  No radiology results for the last day    EKG  Procedures    Stress test      Echocardiogram  Results for orders placed during the hospital encounter of 03/20/23    Adult Transthoracic Echo Complete w/ Color, Spectral and Contrast if Necessary Per Protocol    Interpretation Summary    Left ventricular systolic function is normal. Calculated left ventricular EF = 57%    Left ventricular wall thickness is consistent with mild concentric hypertrophy.    The right atrial cavity is mild to moderately  dilated.    There is calcification of the aortic valve.    Abnormal mitral valve structure consistent with dilated annulus.    There is a trivial pericardial effusion.    Transthoracic echocardiography reveals EF of 57%.  Trivial to small pericardial effusion noted.  Mild MR and mild TR noted.  Moderate left atrial enlargement.    Electronically signed by Tramaine Pérez MD, 03/22/23, 12:08 PM EDT.      Cardiac catheterization  No results found for this or any previous visit.          Assessment and Plan       Diagnoses and all orders for this visit:    1. Primary hypertension (Primary)    2. Chronic heart failure with preserved ejection fraction (HFpEF)    3. ESRD (end stage renal disease)    4. End stage renal disease    5. Hyperlipidemia, unspecified hyperlipidemia type    6. Type 1 diabetes mellitus with hyperglycemia         Hypertension  Blood pressure and heart rate are well-controlled  Continue amlodipine  Have also asked him to keep a log of his blood pressure readings on dialysis days and bring that in for me     End-stage renal disease  On dialysis Monday Wednesday Friday  He still makes very little urine     Preventative care  Recent lipid panel shows LDL of 70 with a total cholesterol of 131  I will resume Crestor  I have also recommended aspirin 81 mg p.o. daily  Discussed with Dr. Alaniz.     HFpEF  Echocardiogram shows preserved LV function, calcified aortic valve,  dilated mitral valve annulus  Appears euvolemic on exam  Not on SGLT2 inhibitor due to end-stage renal disease and type 1 diabetes     Insulin-dependent diabetes  Currently on insulin with primary managing.    This is my first time seeing this patient and I spent 25 minutes reviewing the historical documentation, notes, reports, labs, medications in addition to the 35 minutes in evaluating the patient today and then documentation for today's progress note.

## 2024-11-20 PROBLEM — E66.9 OBESITY (BMI 30-39.9): Status: ACTIVE | Noted: 2024-11-20

## 2024-11-20 PROBLEM — I77.0 A-V FISTULA: Status: ACTIVE | Noted: 2024-11-20

## 2024-11-20 NOTE — PROGRESS NOTES
[facility] VASCULAR SURGERY    Chief Complaint  Hemodialysis Access    Subjective          History of Present Illness  Gerry Torres is a 74 y.o. male with ESRD. He presents to the office today for follow up of his AV graft. He is followed by Dr. Hernandez. He has had the following vascular interventions performed:    Left arm AV graft placement on 4/27/23     He tells me since we last saw him he was hospitalized for 3 weeks in September for pneumonia.  He has recovered from this.  He dialyzes on a MWF schedule. He denies any issues with dialysis treatments or with his fistula, including no recent issues accessing the fistula and denies frequent alarms during his treatments.  He denies any difficulty with prolonged bleeding.He denies any symptoms of vascular steal syndrome.      Review of Systems   Musculoskeletal:  Negative for myalgias.   Skin:  Negative for color change, pallor and wound.   Neurological:  Negative for numbness.        Allergies: Patient has no known allergies.    Prior to Admission medications    Medication Sig Start Date End Date Taking? Authorizing Provider   amLODIPine (NORVASC) 10 MG tablet Take 1 tablet by mouth Daily. TAKE PREOP    Dez Ambrose MD   calcitriol (ROCALTROL) 0.5 MCG capsule Take 1 capsule by mouth 3 (Three) Times a Week. Monday, Wednesday and Friday    Dez Ambrose MD   carvedilol (COREG) 12.5 MG tablet Take 1 tablet by mouth 2 (Two) Times a Day.    Dez Ambrose MD   Cholecalciferol (VITAMIN D3) 2000 units tablet Take 1 tablet by mouth Daily. LAST DOSE 4/20    Dez Ambrose MD   Creon 78452-894702 units capsule delayed-release particles capsule Take 2 capsules by mouth 3 (Three) Times a Day With Meals. 11/28/23   Dez Ambrose MD   cyanocobalamin 1000 MCG/ML injection Inject 1 mL into the appropriate muscle as directed by prescriber Every 30 (Thirty) Days.    Dez Ambrose MD   doxazosin (CARDURA) 8 MG  tablet Take 1 tablet by mouth 2 (Two) Times a Day. TAKE PREOP    Dez Ambrose MD   furosemide (LASIX) 40 MG tablet Take 1 tablet by mouth Daily As Needed.    Dez Ambrose MD   furosemide (LASIX) 80 MG tablet Take 1 tablet by mouth 2 (Two) Times a Day.    Dez Ambrose MD   Insulin Glargine, 2 Unit Dial, (Toujeo Max SoloStar) 300 UNIT/ML solution pen-injector injection Inject 20 Units under the skin into the appropriate area as directed Daily for 30 days.  Patient taking differently: Inject 50 Units under the skin into the appropriate area as directed Every Night. 3/25/23 4/27/23  Gabriel Casanova MD   Insulin Lispro (HUMALOG IJ) Inject 13 Units as directed Every Evening.    Dez Ambrose MD   Insulin Lispro (humaLOG) 100 UNIT/ML injection Inject 9 Units under the skin into the appropriate area as directed 2 (Two) Times a Day. Bkft and lunch  none preop    Dez Ambrose MD   lanreotide acetate (SOMATULINE) 60 MG/0.2ML solution injection Inject 0.2 mL under the skin into the appropriate area as directed Every 30 (Thirty) Days.    Dez Ambrose MD   levocetirizine (XYZAL) 5 MG tablet Take 1 tablet by mouth Every Evening.    Dez Ambrose MD   losartan (COZAAR) 100 MG tablet Take 1 tablet by mouth Daily. 7/22/24   Trav Matt MD   LUMIGAN 0.01 % ophthalmic drops Administer 1 drop to both eyes Every Night. Instill 1 drop into both eyes daily at bedtime 7/8/19   Dez Ambrose MD   metoclopramide (REGLAN) 10 MG tablet Take 1 tablet by mouth 4 (Four) Times a Day. Morning and lunch  TAKE PREOP    Dez Ambrose MD   Multiple Vitamins-Minerals (GWENDOLYN MULTIVITAMIN FOR MEN) tablet Take 1 tablet by mouth Daily. Take one daily  LD 4/20 11/16/15   Dez Ambrose MD   pantoprazole (PROTONIX) 40 MG EC tablet Take 1 tablet by mouth 2 (Two) Times a Day. Take preop    Dez Ambrose MD   pregabalin (LYRICA) 25 MG capsule Take 2 capsules by mouth Every  "Night.    Dez Ambrose MD   rosuvastatin (CRESTOR) 10 MG tablet Take 1 tablet by mouth Daily. 3/18/24   Deepti Culver MD   tamsulosin (FLOMAX) 0.4 MG capsule 24 hr capsule Take 2 capsules by mouth every night at bedtime.    Dez Ambrose MD   Velphoro 500 MG chewable tablet Chew 1 tablet 3 (Three) Times a Day Before Meals. Please see attached for detailed directions 11/20/23   Dez Ambrose MD   vitamin C (ASCORBIC ACID) 500 MG tablet Take 1 tablet by mouth Daily. LD 4/20    Dez Ambrose MD         Objective   Vital Signs:  /74   Ht 182.9 cm (72.01\")   Wt 120 kg (265 lb)   BMI 35.93 kg/m²   Estimated body mass index is 35.93 kg/m² as calculated from the following:    Height as of this encounter: 182.9 cm (72.01\").    Weight as of this encounter: 120 kg (265 lb).       Physical Exam  Vitals reviewed.   Constitutional:       General: He is not in acute distress.     Appearance: He is not ill-appearing.   Cardiovascular:      Rate and Rhythm: Normal rate.      Pulses:           Radial pulses are 2+ on the right side and 2+ on the left side.      Arteriovenous access: Left arteriovenous access is present.     Comments: Thrill and bruit present to the left arm AV graft  Pulmonary:      Effort: Pulmonary effort is normal. No respiratory distress.   Skin:     General: Skin is warm and dry.   Neurological:      General: No focal deficit present.      Mental Status: He is alert and oriented to person, place, and time.      GCS: GCS eye subscore is 4. GCS verbal subscore is 5. GCS motor subscore is 6.        Result Review :    The following data was reviewed by: CURT Moore on 11/21/2024:  Duplex Hemodialysis Access CAR (11/21/2024 11:12)       Progress Notes by Dodie Hernandez MD (06/27/2024 14:00)        Assessment and Plan     Diagnoses and all orders for this visit:    1. A-V fistula (Primary)  -     Duplex Hemodialysis Access CAR; Future    2. ESRD (end stage " renal disease)  -     Duplex Hemodialysis Access CAR; Future    3. Obesity (BMI 30-39.9)    Class 2 Severe Obesity (BMI >=35 and <=39.9). Information on healthy weight added to patient's after visit summary.         Gerry Torres is a 74 y.o. male with ESRD. He denies any issues with dialysis. He denies any symptoms of steal syndrome. There is a thrill and bruit present in the AV graft. Recent duplex shows arterial inflow of 811 mL/min with flow volumes ranging from 912 to 929 ml/min. Diameter and depth are adequate.  He does have elevated velocities along the axillary vein and proximal fistula.  Given that he is not currently having any difficulties with dialysis treatments including no prolonged bleeding we will continue to monitor this and I will plan to see him back in the office in 4 months with a duplex of his fistula.  If he develops any issues, he will contact our office and we can plan for fistulogram. He was instructed to call our office if he had any questions or concerns.     Follow Up     Return in about 4 months (around 3/21/2025) for AV fistula duplex.    Patient was given instructions and counseling regarding his condition or for health maintenance advice. Please see specific information pulled into the AVS if appropriate.     CURT Moore

## 2024-11-21 ENCOUNTER — OFFICE VISIT (OUTPATIENT)
Age: 74
End: 2024-11-21
Payer: MEDICARE

## 2024-11-21 ENCOUNTER — HOSPITAL ENCOUNTER (OUTPATIENT)
Dept: CARDIOLOGY | Facility: HOSPITAL | Age: 74
Discharge: HOME | End: 2024-11-21
Admitting: STUDENT IN AN ORGANIZED HEALTH CARE EDUCATION/TRAINING PROGRAM
Payer: MEDICARE

## 2024-11-21 VITALS
SYSTOLIC BLOOD PRESSURE: 138 MMHG | DIASTOLIC BLOOD PRESSURE: 74 MMHG | HEIGHT: 72 IN | BODY MASS INDEX: 35.89 KG/M2 | WEIGHT: 265 LBS

## 2024-11-21 DIAGNOSIS — E66.9 OBESITY (BMI 30-39.9): ICD-10-CM

## 2024-11-21 DIAGNOSIS — N18.6 ESRD (END STAGE RENAL DISEASE): ICD-10-CM

## 2024-11-21 DIAGNOSIS — I77.0 A-V FISTULA: Primary | ICD-10-CM

## 2024-11-21 LAB
BH CV VAS DIALYSIS ARTERIAL ANASTOMOSIS DIAMETER: 0.4 CM
BH CV VAS DIALYSIS ARTERIAL ANASTOMOSIS EDV: 228 CM/SEC
BH CV VAS DIALYSIS ARTERIAL ANASTOMOSIS PSV: 423 CM/SEC
BH CV VAS DIALYSIS CONDUIT DIST DEPTH: 0.3 CM
BH CV VAS DIALYSIS CONDUIT DIST DIAMETER: 0.6 CM
BH CV VAS DIALYSIS CONDUIT DIST EDV: 76 CM/SEC
BH CV VAS DIALYSIS CONDUIT DIST FLOW VOL: 927 ML/MIN
BH CV VAS DIALYSIS CONDUIT DIST PSV: 158 CM/SEC
BH CV VAS DIALYSIS CONDUIT MID DEPTH: 0.3 CM
BH CV VAS DIALYSIS CONDUIT MID DIAMETER: 0.6 CM
BH CV VAS DIALYSIS CONDUIT MID EDV: 80 CM/SEC
BH CV VAS DIALYSIS CONDUIT MID FLOW VOL: 929 ML/MIN
BH CV VAS DIALYSIS CONDUIT MID PSV: 151 CM/SEC
BH CV VAS DIALYSIS CONDUIT PROX DEPTH: 0.4 CM
BH CV VAS DIALYSIS CONDUIT PROX DIAMETER: 0.5 CM
BH CV VAS DIALYSIS CONDUIT PROX EDV: 155 CM/SEC
BH CV VAS DIALYSIS CONDUIT PROX FLOW VOL: 912 ML/MIN
BH CV VAS DIALYSIS CONDUIT PROX PSV: 339 CM/SEC
BH CV VAS DIALYSIS PRE-INFLOW BRACHIAL DIAMETER: 0.6 CM
BH CV VAS DIALYSIS PRE-INFLOW BRACHIAL EDV: 70 CM/SEC
BH CV VAS DIALYSIS PRE-INFLOW BRACHIAL FLOW VOL: 811 ML/MIN
BH CV VAS DIALYSIS PRE-INFLOW BRACHIAL PSV: 167 CM/SEC
BH CV VAS DIALYSIS VENOUS ANASTOMOSIS DIAMETER: 0.7 CM
BH CV VAS DIALYSIS VENOUS ANASTOMOSIS EDV: 74 CM/SEC
BH CV VAS DIALYSIS VENOUS ANASTOMOSIS PSV: 148 CM/SEC
BH CV VAS DIALYSIS VENOUS OUTFLOW AXILLARY DIAMETER: 0.5 CM
BH CV VAS DIALYSIS VENOUS OUTFLOW AXILLARY EDV: 343 CM/SEC
BH CV VAS DIALYSIS VENOUS OUTFLOW AXILLARY PSV: 659 CM/SEC
BH CV VAS DIALYSIS VENOUS OUTFLOW SUBCLAVIAN DIAMETER: 1.2 CM
BH CV VAS DIALYSIS VENOUS OUTFLOW SUBCLAVIAN EDV: 22 CM/SEC
BH CV VAS DIALYSIS VENOUS OUTFLOW SUBCLAVIAN PSV: 46 CM/SEC

## 2024-11-21 PROCEDURE — 93990 DOPPLER FLOW TESTING: CPT

## 2024-11-21 RX ORDER — ACETAMINOPHEN 325 MG/1
TABLET ORAL
COMMUNITY
Start: 2024-09-24

## 2024-11-21 RX ORDER — HYDRALAZINE HYDROCHLORIDE 100 MG/1
1 TABLET, FILM COATED ORAL 3 TIMES DAILY
COMMUNITY
Start: 2024-08-23

## 2024-11-21 RX ORDER — IPRATROPIUM BROMIDE AND ALBUTEROL SULFATE 2.5; .5 MG/3ML; MG/3ML
3 SOLUTION RESPIRATORY (INHALATION)
COMMUNITY
Start: 2024-10-10

## 2024-11-21 RX ORDER — PEN NEEDLE, DIABETIC 31 GX5/16"
NEEDLE, DISPOSABLE MISCELLANEOUS
COMMUNITY
Start: 2024-11-20

## 2024-11-21 RX ORDER — METHYLPREDNISOLONE 32 MG/1
4 TABLET ORAL DAILY
COMMUNITY

## 2024-11-21 RX ORDER — LIDOCAINE 50 MG/G
PATCH TOPICAL
COMMUNITY
Start: 2024-09-16

## 2024-11-26 ENCOUNTER — OFFICE VISIT (OUTPATIENT)
Dept: CARDIOLOGY | Facility: CLINIC | Age: 74
End: 2024-11-26
Payer: MEDICARE

## 2024-11-26 VITALS
SYSTOLIC BLOOD PRESSURE: 131 MMHG | WEIGHT: 225 LBS | OXYGEN SATURATION: 93 % | BODY MASS INDEX: 30.48 KG/M2 | HEIGHT: 72 IN | HEART RATE: 80 BPM | DIASTOLIC BLOOD PRESSURE: 62 MMHG

## 2024-11-26 DIAGNOSIS — N18.6 END STAGE RENAL DISEASE: ICD-10-CM

## 2024-11-26 DIAGNOSIS — E10.65 TYPE 1 DIABETES MELLITUS WITH HYPERGLYCEMIA: ICD-10-CM

## 2024-11-26 DIAGNOSIS — I10 PRIMARY HYPERTENSION: Primary | ICD-10-CM

## 2024-11-26 DIAGNOSIS — E78.5 HYPERLIPIDEMIA, UNSPECIFIED HYPERLIPIDEMIA TYPE: ICD-10-CM

## 2024-11-26 DIAGNOSIS — N18.6 ESRD (END STAGE RENAL DISEASE): ICD-10-CM

## 2024-11-26 DIAGNOSIS — I50.32 CHRONIC HEART FAILURE WITH PRESERVED EJECTION FRACTION (HFPEF): ICD-10-CM

## 2024-11-26 RX ORDER — HYDROXYZINE HYDROCHLORIDE 50 MG/1
50 TABLET, FILM COATED ORAL EVERY 8 HOURS PRN
COMMUNITY
Start: 2024-11-06

## 2024-11-26 RX ORDER — CALCIUM ACETATE 667 MG/1
667 CAPSULE ORAL 3 TIMES DAILY
COMMUNITY
Start: 2024-11-18

## 2024-11-26 RX ORDER — ROSUVASTATIN CALCIUM 10 MG/1
10 TABLET, COATED ORAL DAILY
Qty: 90 TABLET | Refills: 3 | Status: SHIPPED | OUTPATIENT
Start: 2024-11-26

## 2024-11-26 RX ORDER — FAMOTIDINE 20 MG/1
20 TABLET, FILM COATED ORAL 2 TIMES DAILY
COMMUNITY

## 2025-03-06 ENCOUNTER — OFFICE (OUTPATIENT)
Dept: URBAN - METROPOLITAN AREA CLINIC 64 | Facility: CLINIC | Age: 75
End: 2025-03-06
Payer: MEDICARE

## 2025-03-06 VITALS — HEIGHT: 72 IN | WEIGHT: 210 LBS

## 2025-03-06 DIAGNOSIS — D3A.8 OTHER BENIGN NEUROENDOCRINE TUMORS: ICD-10-CM

## 2025-03-06 DIAGNOSIS — R94.5 ABNORMAL RESULTS OF LIVER FUNCTION STUDIES: ICD-10-CM

## 2025-03-06 DIAGNOSIS — K31.84 GASTROPARESIS: ICD-10-CM

## 2025-03-06 PROCEDURE — 99212 OFFICE O/P EST SF 10 MIN: CPT | Performed by: NURSE PRACTITIONER

## 2025-03-28 ENCOUNTER — APPOINTMENT (OUTPATIENT)
Dept: NEPHROLOGY | Facility: HOSPITAL | Age: 75
DRG: 314 | End: 2025-03-28
Payer: MEDICARE

## 2025-03-28 ENCOUNTER — HOSPITAL ENCOUNTER (INPATIENT)
Facility: HOSPITAL | Age: 75
LOS: 1 days | Discharge: HOME | DRG: 314 | End: 2025-03-28
Attending: EMERGENCY MEDICINE | Admitting: STUDENT IN AN ORGANIZED HEALTH CARE EDUCATION/TRAINING PROGRAM
Payer: MEDICARE

## 2025-03-28 ENCOUNTER — APPOINTMENT (OUTPATIENT)
Dept: INTERVENTIONAL RADIOLOGY/VASCULAR | Facility: HOSPITAL | Age: 75
DRG: 314 | End: 2025-03-28
Payer: MEDICARE

## 2025-03-28 VITALS
SYSTOLIC BLOOD PRESSURE: 125 MMHG | RESPIRATION RATE: 14 BRPM | HEART RATE: 77 BPM | WEIGHT: 202.6 LBS | BODY MASS INDEX: 27.44 KG/M2 | OXYGEN SATURATION: 95 % | DIASTOLIC BLOOD PRESSURE: 72 MMHG | TEMPERATURE: 98 F | HEIGHT: 72 IN

## 2025-03-28 DIAGNOSIS — T82.868A THROMBOSIS OF ARTERIOVENOUS DIALYSIS FISTULA, INITIAL ENCOUNTER: Primary | ICD-10-CM

## 2025-03-28 DIAGNOSIS — T82.590D MALFUNCTION OF ARTERIOVENOUS DIALYSIS FISTULA, SUBSEQUENT ENCOUNTER: Primary | ICD-10-CM

## 2025-03-28 DIAGNOSIS — N18.6 END STAGE RENAL DISEASE: ICD-10-CM

## 2025-03-28 PROBLEM — T82.590A DIALYSIS AV FISTULA MALFUNCTION: Status: ACTIVE | Noted: 2025-03-28

## 2025-03-28 PROBLEM — T82.9XXA COMPLICATION ASSOCIATED WITH DIALYSIS CATHETER: Status: ACTIVE | Noted: 2025-03-28

## 2025-03-28 LAB
ANION GAP SERPL CALCULATED.3IONS-SCNC: 13.2 MMOL/L (ref 5–15)
APTT PPP: 32.8 SECONDS (ref 22.7–35.4)
BASOPHILS # BLD AUTO: 0.04 10*3/MM3 (ref 0–0.2)
BASOPHILS NFR BLD AUTO: 0.7 % (ref 0–1.5)
BUN SERPL-MCNC: 39 MG/DL (ref 8–23)
BUN/CREAT SERPL: 6.5 (ref 7–25)
BURR CELLS BLD QL SMEAR: NORMAL
CALCIUM SPEC-SCNC: 9.1 MG/DL (ref 8.6–10.5)
CHLORIDE SERPL-SCNC: 94 MMOL/L (ref 98–107)
CO2 SERPL-SCNC: 21.8 MMOL/L (ref 22–29)
CREAT SERPL-MCNC: 5.96 MG/DL (ref 0.76–1.27)
DEPRECATED RDW RBC AUTO: 59.6 FL (ref 37–54)
EGFRCR SERPLBLD CKD-EPI 2021: 9.3 ML/MIN/1.73
EOSINOPHIL # BLD AUTO: 0.2 10*3/MM3 (ref 0–0.4)
EOSINOPHIL NFR BLD AUTO: 3.3 % (ref 0.3–6.2)
ERYTHROCYTE [DISTWIDTH] IN BLOOD BY AUTOMATED COUNT: 17 % (ref 12.3–15.4)
FERRITIN SERPL-MCNC: 613 NG/ML (ref 30–400)
GLUCOSE BLDC GLUCOMTR-MCNC: 233 MG/DL (ref 70–105)
GLUCOSE SERPL-MCNC: 145 MG/DL (ref 65–99)
HBV SURFACE AG SERPL QL IA: NORMAL
HCT VFR BLD AUTO: 30.4 % (ref 37.5–51)
HGB BLD-MCNC: 8.9 G/DL (ref 13–17.7)
IMM GRANULOCYTES # BLD AUTO: 0.02 10*3/MM3 (ref 0–0.05)
IMM GRANULOCYTES NFR BLD AUTO: 0.3 % (ref 0–0.5)
INR PPP: 1.15 (ref 0.9–1.1)
IRON 24H UR-MRATE: 53 MCG/DL (ref 59–158)
IRON SATN MFR SERPL: 21 % (ref 20–50)
LYMPHOCYTES # BLD AUTO: 1.95 10*3/MM3 (ref 0.7–3.1)
LYMPHOCYTES NFR BLD AUTO: 32 % (ref 19.6–45.3)
MAGNESIUM SERPL-MCNC: 1.8 MG/DL (ref 1.6–2.4)
MCH RBC QN AUTO: 28.3 PG (ref 26.6–33)
MCHC RBC AUTO-ENTMCNC: 29.3 G/DL (ref 31.5–35.7)
MCV RBC AUTO: 96.8 FL (ref 79–97)
MONOCYTES # BLD AUTO: 1.14 10*3/MM3 (ref 0.1–0.9)
MONOCYTES NFR BLD AUTO: 18.7 % (ref 5–12)
NEUTROPHILS NFR BLD AUTO: 2.75 10*3/MM3 (ref 1.7–7)
NEUTROPHILS NFR BLD AUTO: 45 % (ref 42.7–76)
NRBC BLD AUTO-RTO: 0 /100 WBC (ref 0–0.2)
PLATELET # BLD AUTO: 121 10*3/MM3 (ref 140–450)
PMV BLD AUTO: 11.9 FL (ref 6–12)
POTASSIUM SERPL-SCNC: 4.2 MMOL/L (ref 3.5–5.2)
PROTHROMBIN TIME: 14.6 SECONDS (ref 11.7–14.2)
RBC # BLD AUTO: 3.14 10*6/MM3 (ref 4.14–5.8)
SCHISTOCYTES BLD QL SMEAR: NORMAL
SMALL PLATELETS BLD QL SMEAR: NORMAL
SODIUM SERPL-SCNC: 129 MMOL/L (ref 136–145)
TIBC SERPL-MCNC: 255 MCG/DL (ref 298–536)
TRANSFERRIN SERPL-MCNC: 171 MG/DL (ref 200–360)
WBC MORPH BLD: NORMAL
WBC NRBC COR # BLD AUTO: 6.1 10*3/MM3 (ref 3.4–10.8)

## 2025-03-28 PROCEDURE — 85610 PROTHROMBIN TIME: CPT | Performed by: RADIOLOGY

## 2025-03-28 PROCEDURE — 25010000002 LIDOCAINE 1% - EPINEPHRINE 1:100000 1 %-1:100000 SOLUTION

## 2025-03-28 PROCEDURE — 76937 US GUIDE VASCULAR ACCESS: CPT

## 2025-03-28 PROCEDURE — 25010000002 HEPARIN (PORCINE) PER 1000 UNITS

## 2025-03-28 PROCEDURE — 99285 EMERGENCY DEPT VISIT HI MDM: CPT

## 2025-03-28 PROCEDURE — 82948 REAGENT STRIP/BLOOD GLUCOSE: CPT

## 2025-03-28 PROCEDURE — 63710000001 INSULIN LISPRO (HUMAN) PER 5 UNITS

## 2025-03-28 PROCEDURE — C1750 CATH, HEMODIALYSIS,LONG-TERM: HCPCS

## 2025-03-28 PROCEDURE — 99222 1ST HOSP IP/OBS MODERATE 55: CPT | Performed by: STUDENT IN AN ORGANIZED HEALTH CARE EDUCATION/TRAINING PROGRAM

## 2025-03-28 PROCEDURE — 85007 BL SMEAR W/DIFF WBC COUNT: CPT | Performed by: EMERGENCY MEDICINE

## 2025-03-28 PROCEDURE — 5A1D70Z PERFORMANCE OF URINARY FILTRATION, INTERMITTENT, LESS THAN 6 HOURS PER DAY: ICD-10-PCS | Performed by: STUDENT IN AN ORGANIZED HEALTH CARE EDUCATION/TRAINING PROGRAM

## 2025-03-28 PROCEDURE — 82728 ASSAY OF FERRITIN: CPT | Performed by: INTERNAL MEDICINE

## 2025-03-28 PROCEDURE — 25010000002 FENTANYL CITRATE (PF) 50 MCG/ML SOLUTION

## 2025-03-28 PROCEDURE — 96365 THER/PROPH/DIAG IV INF INIT: CPT

## 2025-03-28 PROCEDURE — 85730 THROMBOPLASTIN TIME PARTIAL: CPT | Performed by: RADIOLOGY

## 2025-03-28 PROCEDURE — 96372 THER/PROPH/DIAG INJ SC/IM: CPT

## 2025-03-28 PROCEDURE — 83735 ASSAY OF MAGNESIUM: CPT | Performed by: EMERGENCY MEDICINE

## 2025-03-28 PROCEDURE — 99152 MOD SED SAME PHYS/QHP 5/>YRS: CPT

## 2025-03-28 PROCEDURE — C1894 INTRO/SHEATH, NON-LASER: HCPCS

## 2025-03-28 PROCEDURE — 25010000002 CEFAZOLIN PER 500 MG

## 2025-03-28 PROCEDURE — 25010000002 HEPARIN (PORCINE) PER 1000 UNITS: Performed by: INTERNAL MEDICINE

## 2025-03-28 PROCEDURE — 83540 ASSAY OF IRON: CPT | Performed by: INTERNAL MEDICINE

## 2025-03-28 PROCEDURE — 36415 COLL VENOUS BLD VENIPUNCTURE: CPT

## 2025-03-28 PROCEDURE — 84466 ASSAY OF TRANSFERRIN: CPT | Performed by: INTERNAL MEDICINE

## 2025-03-28 PROCEDURE — 25010000002 LIDOCAINE 1 % SOLUTION

## 2025-03-28 PROCEDURE — 80048 BASIC METABOLIC PNL TOTAL CA: CPT | Performed by: EMERGENCY MEDICINE

## 2025-03-28 PROCEDURE — 85025 COMPLETE CBC W/AUTO DIFF WBC: CPT | Performed by: EMERGENCY MEDICINE

## 2025-03-28 PROCEDURE — 0JH63XZ INSERTION OF TUNNELED VASCULAR ACCESS DEVICE INTO CHEST SUBCUTANEOUS TISSUE AND FASCIA, PERCUTANEOUS APPROACH: ICD-10-PCS | Performed by: RADIOLOGY

## 2025-03-28 PROCEDURE — 02HV33Z INSERTION OF INFUSION DEVICE INTO SUPERIOR VENA CAVA, PERCUTANEOUS APPROACH: ICD-10-PCS | Performed by: RADIOLOGY

## 2025-03-28 PROCEDURE — 77001 FLUOROGUIDE FOR VEIN DEVICE: CPT

## 2025-03-28 PROCEDURE — 25010000002 EPOETIN ALFA PER 1000 UNITS: Performed by: INTERNAL MEDICINE

## 2025-03-28 PROCEDURE — 25010000002 ONDANSETRON PER 1 MG

## 2025-03-28 PROCEDURE — 87340 HEPATITIS B SURFACE AG IA: CPT | Performed by: INTERNAL MEDICINE

## 2025-03-28 PROCEDURE — 36558 INSERT TUNNELED CV CATH: CPT

## 2025-03-28 RX ORDER — POLYETHYLENE GLYCOL 3350 17 G/17G
17 POWDER, FOR SOLUTION ORAL DAILY PRN
Status: DISCONTINUED | OUTPATIENT
Start: 2025-03-28 | End: 2025-03-28 | Stop reason: HOSPADM

## 2025-03-28 RX ORDER — FAMOTIDINE 20 MG/1
20 TABLET, FILM COATED ORAL 2 TIMES DAILY
Status: CANCELLED | OUTPATIENT
Start: 2025-03-28

## 2025-03-28 RX ORDER — ACETAMINOPHEN 160 MG/5ML
650 SOLUTION ORAL EVERY 4 HOURS PRN
Status: DISCONTINUED | OUTPATIENT
Start: 2025-03-28 | End: 2025-03-28 | Stop reason: HOSPADM

## 2025-03-28 RX ORDER — ONDANSETRON 2 MG/ML
4 INJECTION INTRAMUSCULAR; INTRAVENOUS EVERY 6 HOURS PRN
Status: DISCONTINUED | OUTPATIENT
Start: 2025-03-28 | End: 2025-03-28 | Stop reason: HOSPADM

## 2025-03-28 RX ORDER — INSULIN LISPRO 100 [IU]/ML
1-200 INJECTION, SOLUTION INTRAVENOUS; SUBCUTANEOUS EVERY 6 HOURS SCHEDULED
Status: DISCONTINUED | OUTPATIENT
Start: 2025-03-28 | End: 2025-03-28 | Stop reason: HOSPADM

## 2025-03-28 RX ORDER — HEPARIN SODIUM 1000 [USP'U]/ML
INJECTION, SOLUTION INTRAVENOUS; SUBCUTANEOUS AS NEEDED
Status: COMPLETED | OUTPATIENT
Start: 2025-03-28 | End: 2025-03-28

## 2025-03-28 RX ORDER — FENTANYL CITRATE 50 UG/ML
INJECTION, SOLUTION INTRAMUSCULAR; INTRAVENOUS AS NEEDED
Status: COMPLETED | OUTPATIENT
Start: 2025-03-28 | End: 2025-03-28

## 2025-03-28 RX ORDER — FUROSEMIDE 40 MG/1
40 TABLET ORAL DAILY PRN
Status: CANCELLED | OUTPATIENT
Start: 2025-03-28

## 2025-03-28 RX ORDER — SODIUM CHLORIDE 0.9 % (FLUSH) 0.9 %
10 SYRINGE (ML) INJECTION AS NEEDED
Status: DISCONTINUED | OUTPATIENT
Start: 2025-03-28 | End: 2025-03-28 | Stop reason: HOSPADM

## 2025-03-28 RX ORDER — HYDROXYZINE HYDROCHLORIDE 25 MG/1
50 TABLET, FILM COATED ORAL EVERY 8 HOURS PRN
Status: CANCELLED | OUTPATIENT
Start: 2025-03-28

## 2025-03-28 RX ORDER — ROSUVASTATIN CALCIUM 10 MG/1
10 TABLET, COATED ORAL DAILY
Status: CANCELLED | OUTPATIENT
Start: 2025-03-28

## 2025-03-28 RX ORDER — HYDRALAZINE HYDROCHLORIDE 25 MG/1
100 TABLET, FILM COATED ORAL 3 TIMES DAILY
Status: CANCELLED | OUTPATIENT
Start: 2025-03-28

## 2025-03-28 RX ORDER — IBUPROFEN 600 MG/1
1 TABLET ORAL
Status: DISCONTINUED | OUTPATIENT
Start: 2025-03-28 | End: 2025-03-28 | Stop reason: HOSPADM

## 2025-03-28 RX ORDER — ONDANSETRON 4 MG/1
4 TABLET, ORALLY DISINTEGRATING ORAL EVERY 6 HOURS PRN
Status: DISCONTINUED | OUTPATIENT
Start: 2025-03-28 | End: 2025-03-28 | Stop reason: HOSPADM

## 2025-03-28 RX ORDER — AMLODIPINE BESYLATE 5 MG/1
10 TABLET ORAL DAILY
Status: CANCELLED | OUTPATIENT
Start: 2025-03-28

## 2025-03-28 RX ORDER — ACETAMINOPHEN 650 MG/1
650 SUPPOSITORY RECTAL EVERY 4 HOURS PRN
Status: DISCONTINUED | OUTPATIENT
Start: 2025-03-28 | End: 2025-03-28 | Stop reason: HOSPADM

## 2025-03-28 RX ORDER — LIDOCAINE HYDROCHLORIDE 10 MG/ML
INJECTION, SOLUTION INFILTRATION; PERINEURAL AS NEEDED
Status: COMPLETED | OUTPATIENT
Start: 2025-03-28 | End: 2025-03-28

## 2025-03-28 RX ORDER — AMOXICILLIN 250 MG
2 CAPSULE ORAL 2 TIMES DAILY PRN
Status: DISCONTINUED | OUTPATIENT
Start: 2025-03-28 | End: 2025-03-28 | Stop reason: HOSPADM

## 2025-03-28 RX ORDER — LIDOCAINE HYDROCHLORIDE AND EPINEPHRINE 10; 10 MG/ML; UG/ML
INJECTION, SOLUTION INFILTRATION; PERINEURAL AS NEEDED
Status: COMPLETED | OUTPATIENT
Start: 2025-03-28 | End: 2025-03-28

## 2025-03-28 RX ORDER — CALCIUM ACETATE 667 MG/1
667 CAPSULE ORAL 3 TIMES DAILY
Status: CANCELLED | OUTPATIENT
Start: 2025-03-28

## 2025-03-28 RX ORDER — TERAZOSIN 5 MG/1
5 CAPSULE ORAL
Status: CANCELLED | OUTPATIENT
Start: 2025-03-28

## 2025-03-28 RX ORDER — DEXTROSE MONOHYDRATE 25 G/50ML
25 INJECTION, SOLUTION INTRAVENOUS
Status: DISCONTINUED | OUTPATIENT
Start: 2025-03-28 | End: 2025-03-28 | Stop reason: HOSPADM

## 2025-03-28 RX ORDER — NICOTINE POLACRILEX 4 MG
15 LOZENGE BUCCAL
Status: DISCONTINUED | OUTPATIENT
Start: 2025-03-28 | End: 2025-03-28 | Stop reason: HOSPADM

## 2025-03-28 RX ORDER — BISACODYL 10 MG
10 SUPPOSITORY, RECTAL RECTAL DAILY PRN
Status: DISCONTINUED | OUTPATIENT
Start: 2025-03-28 | End: 2025-03-28 | Stop reason: HOSPADM

## 2025-03-28 RX ORDER — ONDANSETRON 2 MG/ML
INJECTION INTRAMUSCULAR; INTRAVENOUS AS NEEDED
Status: COMPLETED | OUTPATIENT
Start: 2025-03-28 | End: 2025-03-28

## 2025-03-28 RX ORDER — ACETAMINOPHEN 325 MG/1
650 TABLET ORAL EVERY 4 HOURS PRN
Status: DISCONTINUED | OUTPATIENT
Start: 2025-03-28 | End: 2025-03-28 | Stop reason: HOSPADM

## 2025-03-28 RX ORDER — INSULIN LISPRO 100 [IU]/ML
1-200 INJECTION, SOLUTION INTRAVENOUS; SUBCUTANEOUS AS NEEDED
Status: DISCONTINUED | OUTPATIENT
Start: 2025-03-28 | End: 2025-03-28 | Stop reason: HOSPADM

## 2025-03-28 RX ORDER — IPRATROPIUM BROMIDE AND ALBUTEROL SULFATE 2.5; .5 MG/3ML; MG/3ML
3 SOLUTION RESPIRATORY (INHALATION)
Status: CANCELLED | OUTPATIENT
Start: 2025-03-28

## 2025-03-28 RX ORDER — HEPARIN SODIUM 1000 [USP'U]/ML
5000 INJECTION, SOLUTION INTRAVENOUS; SUBCUTANEOUS AS NEEDED
Status: DISCONTINUED | OUTPATIENT
Start: 2025-03-28 | End: 2025-03-28 | Stop reason: HOSPADM

## 2025-03-28 RX ORDER — CALCITRIOL 0.5 UG/1
0.5 CAPSULE, LIQUID FILLED ORAL 3 TIMES WEEKLY
Status: CANCELLED | OUTPATIENT
Start: 2025-03-28

## 2025-03-28 RX ORDER — BISACODYL 5 MG/1
5 TABLET, DELAYED RELEASE ORAL DAILY PRN
Status: DISCONTINUED | OUTPATIENT
Start: 2025-03-28 | End: 2025-03-28 | Stop reason: HOSPADM

## 2025-03-28 RX ORDER — METOCLOPRAMIDE 10 MG/1
10 TABLET ORAL 4 TIMES DAILY
Status: CANCELLED | OUTPATIENT
Start: 2025-03-28

## 2025-03-28 RX ORDER — TAMSULOSIN HYDROCHLORIDE 0.4 MG/1
0.8 CAPSULE ORAL DAILY
Status: CANCELLED | OUTPATIENT
Start: 2025-03-28

## 2025-03-28 RX ADMIN — INSULIN LISPRO 1 UNITS: 100 INJECTION, SOLUTION INTRAVENOUS; SUBCUTANEOUS at 14:08

## 2025-03-28 RX ADMIN — HEPARIN SODIUM 4100 UNITS: 1000 INJECTION, SOLUTION INTRAVENOUS; SUBCUTANEOUS at 13:48

## 2025-03-28 RX ADMIN — FENTANYL CITRATE 100 MCG: 50 INJECTION, SOLUTION INTRAMUSCULAR; INTRAVENOUS at 13:30

## 2025-03-28 RX ADMIN — ERYTHROPOIETIN 20000 UNITS: 10000 INJECTION, SOLUTION INTRAVENOUS; SUBCUTANEOUS at 14:11

## 2025-03-28 RX ADMIN — Medication 10 ML: at 08:49

## 2025-03-28 RX ADMIN — LIDOCAINE HYDROCHLORIDE AND EPINEPHRINE 7 ML: 10; 10 INJECTION, SOLUTION INFILTRATION; PERINEURAL at 13:35

## 2025-03-28 RX ADMIN — HEPARIN SODIUM 5000 UNITS: 1000 INJECTION INTRAVENOUS; SUBCUTANEOUS at 18:40

## 2025-03-28 RX ADMIN — LIDOCAINE HYDROCHLORIDE 5 ML: 10 INJECTION, SOLUTION INFILTRATION; PERINEURAL at 13:34

## 2025-03-28 RX ADMIN — ONDANSETRON 4 MG: 2 INJECTION INTRAMUSCULAR; INTRAVENOUS at 13:27

## 2025-03-28 RX ADMIN — CEFAZOLIN 2000 MG: 1 INJECTION, POWDER, FOR SOLUTION INTRAMUSCULAR; INTRAVENOUS at 13:21

## 2025-03-28 NOTE — NURSING NOTE
Pt arrived at HD unit via stretcher with transport.  No signs and symptoms of distress noted. Aox4.  Machine checked and passed safety protocols.  HD orders checked and verified.   Aspirated heparin on TDC, flushed with normal saline. No resistance noted on right TDC.  HD treatment initiated.

## 2025-03-28 NOTE — CONSULTS
[facility] Vascular Surgery  Consult Note    Patient Name: Gerry Torres  : 1950  MRN: [id number]  Primary Care Physician:  Mary Le MD  Referring Physician: No Known Provider  Date of admission: 3/28/2025    Inpatient Vascular Surgery Consult  Consult performed by: Josué Calvin II, MD  Consult ordered by: Krishna Forrest MD        Subjective   Subjective     Reason for Consult/ Chief Complaint: Thrombosed arteriovenous graft    History of Present Illness  Gerry Torres is a 74 y.o. male with widely metastatic pancreatic neuroendocrine cancer, end-stage renal disease on dialysis, diabetes, who presented to the ER from [facility] rehab due to inability to get dialysis this morning.  Left arm arteriovenous graft placed 23 by Dr. Hernandez.  Feels well with no acute complaints.    Review of Systems     Personal History     Past Medical History:   Diagnosis Date    Acute kidney failure, unspecified     B12 deficiency     BPH (benign prostatic hyperplasia)     Cancer 2020    PANCREAS AND KIDNEY    Diabetes mellitus     INSULIN DEPENDANT    End stage renal disease on dialysis     3/30/2023    Glaucoma     HLD (hyperlipidemia)     Hypertension     Peripheral edema     Sleep apnea     CPAP BRING DOS    Stomach disorder     NOT EMPTYING    Vitamin D deficiency, unspecified        Past Surgical History:   Procedure Laterality Date    ARTERIOVENOUS FISTULA/SHUNT SURGERY Left 2023    Procedure: ARTERIOVENOUS GRAFT;  Surgeon: Dodie Hernandez MD;  Location: [facility] OR;  Service: Vascular;  Laterality: Left;    GALLBLADDER SURGERY  2023    [facility]    HERNIA REPAIR      KNEE ARTHROSCOPY Left     NEPHRECTOMY PARTIAL Left     10%    SPLENECTOMY      TONSILLECTOMY  1974    VITRECTOMY PARS PLANA Right 10/22/2019    Procedure: 25G VITRECTOMY-ENDOLASER;  Surgeon: Lazarus, Howard S., MD;  Location: [facility] MAIN OR;  Service: Ophthalmology    WHIPPLE PROCEDURE         Family History: His  "family history is not on file.     Social History: He  reports that he quit smoking about 6 years ago. His smoking use included cigarettes. He started smoking about 55 years ago. He has a 49 pack-year smoking history. He has been exposed to tobacco smoke. He has never used smokeless tobacco. He reports that he does not currently use alcohol. He reports current drug use. Frequency: 3.00 times per week. Drug: Marijuana.    Home Medications:  Ferric Citrate, Insulin Glargine (2 Unit Dial), Insulin Lispro, Insulin Pen Needle, Sucroferric Oxyhydroxide, Vitamin D3, acetaminophen, amLODIPine, bimatoprost, calcitriol, calcium acetate, cyanocobalamin, doxazosin, famotidine, furosemide, hydrALAZINE, hydrOXYzine, ipratropium-albuterol, lanreotide acetate, lidocaine, metoclopramide, multivitamin with minerals, pancrelipase (Lip-Prot-Amyl), rosuvastatin, tamsulosin, and vitamin C    Allergies:  He has no known allergies.    Objective    Objective     Vitals:    Temp:  [97.7 °F (36.5 °C)] 97.7 °F (36.5 °C)  Heart Rate:  [78] 78  Resp:  [16] 16  BP: (109)/(58) 109/58    Physical Exam  NAD  Respirations unlabored  L arm AV graft with no thrill, no doppler signal in graft  Palpable L radial pulse  LE edema noted     Result Review    Result Review:  I have personally reviewed the results from the time of this admission to 3/28/2025 09:06 EDT and agree with these findings:  []  Laboratory list / accordion  []  Microbiology  []  Radiology  []  EKG/Telemetry   []  Cardiology/Vascular   []  Pathology  [x]  Old records  []  Other:  Most notable findings include: Labs pending      CBC      BMP     Cr Clearance CrCl cannot be calculated (Patient's most recent lab result is older than the maximum 30 days allowed.).  Coag     HbA1C   Lab Results   Component Value Date    HGBA1C 8.1 (H) 09/05/2023    HGBA1C 6.40 (H) 03/22/2023    HGBA1C 6.90 (H) 03/21/2023     Blood Glucose No results found for: \"POCGLU\"  Infection     CMP     ABG      UA    " "  STEFFANIE  No results found for: \"POCMETH\", \"POCAMPHET\", \"POCBARBITUR\", \"POCBENZO\", \"POCCOCAINE\", \"POCOPIATES\", \"POCOXYCODO\", \"POCPHENCYC\", \"POCPROPOXY\", \"POCTHC\", \"POCTRICYC\"  [facility]     Radiology(recent) No radiology results for the last day    Assessment & Plan   Assessment / Plan     Brief Patient Summary:  Gerry Torres is a 74 y.o. male with end-stage renal disease on dialysis, widely metastatic neuroendocrine tumor, diabetes who presents with thrombosed left arm arteriovenous graft.  Labs pending.  No OR space/surgeon availability to do declot today.  This case with Dr. Grover Gómez with IR who says he likely has time to place a temporary dialysis catheter and time to do declot today as well depending on his schedule.  An order for the dialysis line.  Will follow.  If only able to get temporary line today we could potentially do a declot early next week.    Active Hospital Problems:  There are no active hospital problems to display for this patient.      Plan:   Follow up labs  Dialysis per nephrology  Will see what availability IR has for possible line +/- declot   Keep NPO for now until we have confirmation form IR whether they can place line and/or do declot today.     VTE Prophylaxis:  No VTE prophylaxis order currently exists.         MD Josué Alonso II, II, MD  03/28/25  09:06 EDT  Office Number [phone]    [facility] Vascular Surgery      ADDENDUM: Patient has had tunneled dialysis catheter placed by interventional radiology.  The earliest we can declot this graft will be Monday in [place].  He has been scheduled for 1030 in OR 18 at [facility].  Patient and his wife were amenable to this plan.  Anticipate patient will likely be able to discharge after completing dialysis and have declot on Monday as outpatient.  If patient ends up having to stay in the hospital through the weekend please reconsult us and we will try to arrange declot here at [place] next " week.      Josué Calvin II, MD  03/28/25  14:35 EDT  Office Number [phone]    [facility] Vascular Surgery

## 2025-03-28 NOTE — H&P
[facility]   Interventional Radiology H&P    Patient Name: Gerry Torres  : 1950  MRN: [id number]  Primary Care Physician:  Mary Le MD  Referring Physician: No Known Provider  Date of admission: 3/28/2025    Subjective   Subjective     HPI:  Gerry Torres is a 74 y.o. male with end stage renal disease and malfunctioning AV graft.    Review of Systems:   Constitutional no fever,  no weight loss       Otolaryngeal no difficulty swallowing   Cardiovascular no chest pain   Pulmonary no cough, no sputum production   Gastrointestinal no constipation, no diarrhea                         Personal History       Past Medical/Surgical History:   Past Medical History:   Diagnosis Date    Acute kidney failure, unspecified     B12 deficiency     BPH (benign prostatic hyperplasia)     Cancer 2020    PANCREAS AND KIDNEY    Diabetes mellitus     INSULIN DEPENDANT    End stage renal disease on dialysis     3/30/2023    Glaucoma     HLD (hyperlipidemia)     Hypertension     Peripheral edema     Sleep apnea     CPAP BRING DOS    Stomach disorder     NOT EMPTYING    Vitamin D deficiency, unspecified      Past Surgical History:   Procedure Laterality Date    ARTERIOVENOUS FISTULA/SHUNT SURGERY Left 2023    Procedure: ARTERIOVENOUS GRAFT;  Surgeon: Dodie Hernandez MD;  Location: [facility];  Service: Vascular;  Laterality: Left;    GALLBLADDER SURGERY  2023    [facility]    HERNIA REPAIR      KNEE ARTHROSCOPY Left     NEPHRECTOMY PARTIAL Left     10%    SPLENECTOMY      TONSILLECTOMY  1974    VITRECTOMY PARS PLANA Right 10/22/2019    Procedure: 25G VITRECTOMY-ENDOLASER;  Surgeon: Lazarus, Howard S., MD;  Location: [facility] OR;  Service: Ophthalmology    WHIPPLE PROCEDURE         Social History:  reports that he quit smoking about 6 years ago. His smoking use included cigarettes. He started smoking about 55 years ago. He has a 49 pack-year smoking history. He has been exposed to  "tobacco smoke. He has never used smokeless tobacco. He reports that he does not currently use alcohol. He reports current drug use. Frequency: 3.00 times per week. Drug: Marijuana.    Medications:  (Not in a hospital admission)    Current medications:  epoetin janes/janes-epbx, 20,000 Units, Subcutaneous, Once  insulin glargine, 1-200 Units, Subcutaneous, Nightly - Glucommander  insulin lispro, 1-200 Units, Subcutaneous, Q6H      Current IV drips:       Allergies:  No Known Allergies    Objective    Objective     Vitals:   Temp:  [97.7 °F (36.5 °C)] 97.7 °F (36.5 °C)  Heart Rate:  [78-85] 82  Resp:  [16] 16  BP: (109-125)/(58-71) 121/66      Physical Exam:   Constitutional: Awake, alert, No acute distress    Respiratory: Clear to auscultation bilaterally, nonlabored respirations    Cardiovascular: RRR, no murmurs, rubs, or gallops, palpable pedal pulses bilaterally   Gastrointestinal: Positive bowel sounds, soft, nontender, nondistended        ASA SCALE ASSESSMENT:  2-Mild to moderate systemic disease, medically well controlled, with no functional limitation    MALLAMPATI CLASSIFICATION:  2-Able to visualize the soft palate, fauces, uvula. The anterior & posterior tonsilar pillars are hidden by the tongue.       Result Review        Result Review:     Sodium   Date Value Ref Range Status   03/28/2025 129 (L) 136 - 145 mmol/L Final       Potassium   Date Value Ref Range Status   03/28/2025 4.2 3.5 - 5.2 mmol/L Final       Chloride   Date Value Ref Range Status   03/28/2025 94 (L) 98 - 107 mmol/L Final       No results found for: \"PLASMABICARB\"    BUN   Date Value Ref Range Status   03/28/2025 39 (H) 8 - 23 mg/dL Final       Creatinine   Date Value Ref Range Status   03/28/2025 5.96 (H) 0.76 - 1.27 mg/dL Final       Calcium   Date Value Ref Range Status   03/28/2025 9.1 8.6 - 10.5 mg/dL Final           No components found for: \"GLUCOSE.*\"  Results from last 7 days   Lab Units 03/28/25  0842   WBC 10*3/mm3 6.10 "   HEMOGLOBIN g/dL 8.9*   HEMATOCRIT % 30.4*   PLATELETS 10*3/mm3 121*      Results from last 7 days   Lab Units 03/28/25  1019   INR  1.15*           Assessment / Plan     Assesment:   ESRD and malfunctioning AV graft.      Plan:   Tunneled dialysis catheter placement.     The risks and benefits of the procedure were discussed with the patient.    Electronically signed by CURT Domínguez, 03/28/25, 1:12 PM EDT.

## 2025-03-28 NOTE — CONSULTS
RENAL/KCC:    Referring Provider: Dr. Murphy  Reason for Consultation: ESRD    Subjective     Chief complaint: Clotted LUE AVF    History of present illness:  Patient is a 73 yo WM with h/o ESRD on MWF HD.  He has bee at [facility] rehab recently and was supposed to have HD today and be discharged home.  His access was noted to be thrombosed.  He was sent to the ER and is waiting on IR to place a temporary line for dialysis as there is no availability for a declot or permcath today.  No other complaints.    History  Past Medical History:   Diagnosis Date    Acute kidney failure, unspecified     B12 deficiency     BPH (benign prostatic hyperplasia)     Cancer 2020    PANCREAS AND KIDNEY    Diabetes mellitus     INSULIN DEPENDANT    End stage renal disease on dialysis     3/30/2023    Glaucoma     HLD (hyperlipidemia)     Hypertension     Peripheral edema     Sleep apnea     CPAP BRING DOS    Stomach disorder     NOT EMPTYING    Vitamin D deficiency, unspecified    ,   Past Surgical History:   Procedure Laterality Date    ARTERIOVENOUS FISTULA/SHUNT SURGERY Left 2023    Procedure: ARTERIOVENOUS GRAFT;  Surgeon: Dodie Hernandez MD;  Location: [facility];  Service: Vascular;  Laterality: Left;    GALLBLADDER SURGERY  2023    [facility]    HERNIA REPAIR      KNEE ARTHROSCOPY Left     NEPHRECTOMY PARTIAL Left     10%    SPLENECTOMY      TONSILLECTOMY  1974    VITRECTOMY PARS PLANA Right 10/22/2019    Procedure: 25G VITRECTOMY-ENDOLASER;  Surgeon: Lazarus, Howard S., MD;  Location: [facility] OR;  Service: Ophthalmology    WHIPPLE PROCEDURE     , No family history on file.,   Social History     Socioeconomic History    Marital status:    Tobacco Use    Smoking status: Former     Current packs/day: 0.00     Average packs/day: 1 pack/day for 49.0 years (49.0 ttl pk-yrs)     Types: Cigarettes     Start date: 1969     Quit date: 2018     Years since quittin.3     Passive exposure: Past     Smokeless tobacco: Never   Vaping Use    Vaping status: Never Used   Substance and Sexual Activity    Alcohol use: Not Currently     Comment: ocassional    Drug use: Yes     Frequency: 3.0 times per week     Types: Marijuana     Comment: occasional gummy  LAST DOSE 4/22    Sexual activity: Defer     E-cigarette/Vaping    E-cigarette/Vaping Use Never User     Counseling Given No      E-cigarette/Vaping Substances    Nicotine No     THC No     CBD No     Flavoring No      E-cigarette/Vaping Devices    Disposable No     Pre-filled or Refillable Cartridge No     Refillable Tank No     Pre-filled Pod No          , (Not in a hospital admission) , Scheduled Meds:   , Continuous Infusions:   , PRN Meds:    acetaminophen **OR** acetaminophen **OR** acetaminophen    senna-docusate sodium **AND** polyethylene glycol **AND** bisacodyl **AND** bisacodyl    Calcium Replacement - Follow Nurse / BPA Driven Protocol    Magnesium Standard Dose Replacement - Follow Nurse / BPA Driven Protocol    melatonin    ondansetron ODT **OR** ondansetron    Phosphorus Replacement - Follow Nurse / BPA Driven Protocol    Potassium Replacement - Follow Nurse / BPA Driven Protocol    [COMPLETED] Insert Peripheral IV **AND** sodium chloride, and Allergies:  Patient has no known allergies.    Review of Systems  Pertinent items are noted in HPI    Objective     Vital Signs  Temp:  [97.7 °F (36.5 °C)] 97.7 °F (36.5 °C)  Heart Rate:  [78-84] 83  Resp:  [16] 16  BP: (109-122)/(58-67) 119/67    No intake/output data recorded.  No intake/output data recorded.    Physical Exam:  GEN: Awake, NAD  ENT: PERRL, EOMI, MMM  NECK: Supple, no JVD  CHEST: CTAB, no W/R/C  CV: RRR, no M/G/R  ABD: Soft, NT, +BS  SKIN: Warm and Dry  NEURO: CN's intact      Results Review:   I reviewed the patient's new clinical results.    WBC WBC   Date Value Ref Range Status   03/28/2025 6.10 3.40 - 10.80 10*3/mm3 Final      HGB Hemoglobin   Date Value Ref Range Status   03/28/2025 8.9  "(L) 13.0 - 17.7 g/dL Final      HCT Hematocrit   Date Value Ref Range Status   03/28/2025 30.4 (L) 37.5 - 51.0 % Final      Platlets No results found for: \"LABPLAT\"   MCV MCV   Date Value Ref Range Status   03/28/2025 96.8 79.0 - 97.0 fL Final          Sodium Sodium   Date Value Ref Range Status   03/28/2025 129 (L) 136 - 145 mmol/L Final      Potassium Potassium   Date Value Ref Range Status   03/28/2025 4.2 3.5 - 5.2 mmol/L Final      Chloride Chloride   Date Value Ref Range Status   03/28/2025 94 (L) 98 - 107 mmol/L Final      CO2 CO2   Date Value Ref Range Status   03/28/2025 21.8 (L) 22.0 - 29.0 mmol/L Final      BUN BUN   Date Value Ref Range Status   03/28/2025 39 (H) 8 - 23 mg/dL Final      Creatinine Creatinine   Date Value Ref Range Status   03/28/2025 5.96 (H) 0.76 - 1.27 mg/dL Final      Calcium Calcium   Date Value Ref Range Status   03/28/2025 9.1 8.6 - 10.5 mg/dL Final      PO4 No results found for: \"CAPO4\"   Albumin No results found for: \"ALBUMIN\"   Magnesium Magnesium   Date Value Ref Range Status   03/28/2025 1.8 1.6 - 2.4 mg/dL Final      Uric Acid No results found for: \"URICACID\"                 Assessment & Plan     ESRD  Clotted AVF  Anemia of CKD  H/O Pancreatic cancer    [facility].  Keep in the hospital until AVF can be declotted.  Will follow along.        Jame Alaniz MD  [facility]  03/28/25  @NOW        "

## 2025-03-28 NOTE — Clinical Note
Level of Care: Med/Surg [1]   Diagnosis: Complication associated with dialysis catheter [[id number]]   Admitting Physician: STEFANIE GILL [[id number]]   Attending Physician: STEFANIE GILL [[id number]]   Certification: I Certify That Inpatient Hospital Services Are Medically Necessary For Greater Than 2 Midnights

## 2025-03-28 NOTE — ED PROVIDER NOTES
Subjective   History of Present Illness  Chief complaint: Clotted fistula    74-year-old male with history of end-stage renal disease on dialysis presents with a clotted fistula in the left upper arm.  Patient states he dialyzed on Wednesday as usual.  He went for his dialysis this morning and his fistula was clotted so he was sent to the emergency room.  He denies any pain.  He denies any other complaints.    History provided by:  Patient      Review of Systems   Constitutional:  Negative for fever.   HENT:  Negative for congestion.    Respiratory:  Negative for cough and shortness of breath.    Cardiovascular:  Negative for chest pain.   Gastrointestinal:  Negative for abdominal pain and vomiting.   Musculoskeletal:  Negative for back pain.   Neurological:  Negative for headaches.   Psychiatric/Behavioral:  Negative for confusion.        Past Medical History:   Diagnosis Date    Acute kidney failure, unspecified     B12 deficiency     BPH (benign prostatic hyperplasia)     Cancer 01/2020    PANCREAS AND KIDNEY    Diabetes mellitus     INSULIN DEPENDANT    End stage renal disease on dialysis     3/30/2023    Glaucoma     HLD (hyperlipidemia)     Hypertension     Peripheral edema     Sleep apnea     CPAP BRING DOS    Stomach disorder     NOT EMPTYING    Vitamin D deficiency, unspecified        No Known Allergies    Past Surgical History:   Procedure Laterality Date    ARTERIOVENOUS FISTULA/SHUNT SURGERY Left 04/27/2023    Procedure: ARTERIOVENOUS GRAFT;  Surgeon: Dodie Hernandez MD;  Location: [facility] OR;  Service: Vascular;  Laterality: Left;    GALLBLADDER SURGERY  09/2023    [facility]    HERNIA REPAIR  1962    KNEE ARTHROSCOPY Left 1979    NEPHRECTOMY PARTIAL Left     10%    SPLENECTOMY      TONSILLECTOMY  1974    VITRECTOMY PARS PLANA Right 10/22/2019    Procedure: 25G VITRECTOMY-ENDOLASER;  Surgeon: Lazarus, Howard S., MD;  Location: [facility] MAIN OR;  Service: Ophthalmology    WHIPPLE PROCEDURE         No  "family history on file.    Social History     Socioeconomic History    Marital status:    Tobacco Use    Smoking status: Former     Current packs/day: 0.00     Average packs/day: 1 pack/day for 49.0 years (49.0 ttl pk-yrs)     Types: Cigarettes     Start date: 1969     Quit date: 2018     Years since quittin.3     Passive exposure: Past    Smokeless tobacco: Never   Vaping Use    Vaping status: Never Used   Substance and Sexual Activity    Alcohol use: Not Currently     Comment: ocassional    Drug use: Yes     Frequency: 3.0 times per week     Types: Marijuana     Comment: occasional gummy  LAST DOSE     Sexual activity: Defer       /67   Pulse 82   Temp 97.7 °F (36.5 °C) (Oral)   Resp 16   Ht 182.9 cm (72\")   Wt 91.9 kg (202 lb 9.6 oz)   SpO2 91%   BMI 27.48 kg/m²       Objective   Physical Exam  Vitals and nursing note reviewed.   Constitutional:       Appearance: Normal appearance.   HENT:      Head: Normocephalic and atraumatic.      Mouth/Throat:      Mouth: Mucous membranes are moist.   Cardiovascular:      Rate and Rhythm: Normal rate and regular rhythm.   Pulmonary:      Effort: Pulmonary effort is normal. No respiratory distress.      Breath sounds: Normal breath sounds.   Musculoskeletal:      Comments: Dialysis fistula in the left upper arm is firm on palpation with no palpable thrill   Skin:     General: Skin is warm and dry.   Neurological:      Mental Status: He is alert and oriented to person, place, and time.         Procedures           ED Course                                           Results for orders placed or performed during the hospital encounter of 25   Basic Metabolic Panel    Collection Time: 25  8:42 AM    Specimen: Blood   Result Value Ref Range    Glucose 145 (H) 65 - 99 mg/dL    BUN 39 (H) 8 - 23 mg/dL    Creatinine 5.96 (H) 0.76 - 1.27 mg/dL    Sodium 129 (L) 136 - 145 mmol/L    Potassium 4.2 3.5 - 5.2 mmol/L    Chloride 94 (L) 98 - 107 " mmol/L    CO2 21.8 (L) 22.0 - 29.0 mmol/L    Calcium 9.1 8.6 - 10.5 mg/dL    BUN/Creatinine Ratio 6.5 (L) 7.0 - 25.0    Anion Gap 13.2 5.0 - 15.0 mmol/L    eGFR 9.3 (L) >60.0 mL/min/1.73   Magnesium    Collection Time: 03/28/25  8:42 AM    Specimen: Blood   Result Value Ref Range    Magnesium 1.8 1.6 - 2.4 mg/dL   CBC Auto Differential    Collection Time: 03/28/25  8:42 AM    Specimen: Blood   Result Value Ref Range    WBC 6.10 3.40 - 10.80 10*3/mm3    RBC 3.14 (L) 4.14 - 5.80 10*6/mm3    Hemoglobin 8.9 (L) 13.0 - 17.7 g/dL    Hematocrit 30.4 (L) 37.5 - 51.0 %    MCV 96.8 79.0 - 97.0 fL    MCH 28.3 26.6 - 33.0 pg    MCHC 29.3 (L) 31.5 - 35.7 g/dL    RDW 17.0 (H) 12.3 - 15.4 %    RDW-SD 59.6 (H) 37.0 - 54.0 fl    MPV 11.9 6.0 - 12.0 fL    Platelets 121 (L) 140 - 450 10*3/mm3    Neutrophil % 45.0 42.7 - 76.0 %    Lymphocyte % 32.0 19.6 - 45.3 %    Monocyte % 18.7 (H) 5.0 - 12.0 %    Eosinophil % 3.3 0.3 - 6.2 %    Basophil % 0.7 0.0 - 1.5 %    Immature Grans % 0.3 0.0 - 0.5 %    Neutrophils, Absolute 2.75 1.70 - 7.00 10*3/mm3    Lymphocytes, Absolute 1.95 0.70 - 3.10 10*3/mm3    Monocytes, Absolute 1.14 (H) 0.10 - 0.90 10*3/mm3    Eosinophils, Absolute 0.20 0.00 - 0.40 10*3/mm3    Basophils, Absolute 0.04 0.00 - 0.20 10*3/mm3    Immature Grans, Absolute 0.02 0.00 - 0.05 10*3/mm3    nRBC 0.0 0.0 - 0.2 /100 WBC   Scan Slide    Collection Time: 03/28/25  8:42 AM    Specimen: Blood   Result Value Ref Range    Crenated RBC's Mod/2+ None Seen    Schistocytes Slight/1+ None Seen    WBC Morphology Normal Normal    Platelet Estimate Decreased Normal   Protime-INR    Collection Time: 03/28/25 10:19 AM    Specimen: Arm, Right; Blood   Result Value Ref Range    Protime 14.6 (H) 11.7 - 14.2 Seconds    INR 1.15 (H) 0.90 - 1.10   aPTT    Collection Time: 03/28/25 10:19 AM    Specimen: Arm, Right; Blood   Result Value Ref Range    PTT 32.8 22.7 - 35.4 seconds   Hepatitis B Surface Antigen    Collection Time: 03/28/25 10:19 AM     Specimen: Arm, Right; Blood   Result Value Ref Range    Hepatitis B Surface Ag Non-Reactive Non-Reactive                   Medical Decision Making  Amount and/or Complexity of Data Reviewed  Labs: ordered.    Risk  Prescription drug management.      Patient had the above evaluation.  Results were discussed with the patient.  White blood cell count is normal.  Hemoglobin is little low at 8.9.  BMP significant for sodium of 129, BUN 39, creatinine 5.96.  Potassium is okay at 4.2.  I discussed with Dr. Calvin with vascular surgery.  There is no OR availability today for a declotting procedure.  I also discussed with interventional radiology.  They are also booked up today and said all they would be able to do is place a temporary dialysis catheter.  I discussed with Dr. Alaniz, patient's nephrologist, who states if they are only able to place a temporary catheter then patient will need to be admitted for inpatient dialysis.  I discussed with the provider on-call for the hospitalist and patient will be admitted for further evaluation and management.      Final diagnoses:   Thrombosis of arteriovenous dialysis fistula, initial encounter       ED Disposition  ED Disposition       ED Disposition   Decision to Admit    Condition   --    Comment   Level of Care: Med/Surg [1]   Diagnosis: Complication associated with dialysis catheter [[id number]]   Admitting Physician: STEFANIE GILL [[id number]]   Attending Physician: STEFANIE GILL [[id number]]   Certification: I Certify That Inpatient Hospital Services Are Medically Necessary For Greater Than 2 Midnights                 No follow-up provider specified.       Medication List      No changes were made to your prescriptions during this visit.            Krishna Forrest MD  03/28/25 [id number]

## 2025-03-28 NOTE — NURSING NOTE
PT tolerated HD treatment well.  No distress noted.   UF goal of :2.0L. Blood processed 62.8L.  Returned blood back to patient. Flushed with normal saline and locked with heparin. Capped and secured with gauze dressing.  Report given to primary RN.

## 2025-03-28 NOTE — H&P
[facility] Medicine Services  History & Physical    Patient Name: Gerry Torres  : 1950  MRN: [id number]  Primary Care Physician:  Mary Le MD  Date of admission: 3/28/2025  Date and Time of Service: 3/28/2025 at 1117    Subjective      Chief Complaint: clotted fistula    History of Present Illness: Gerry Torres is a 74 y.o. male with a CMH of ESRD on dialysis, hyperlipidemia, hypertension, Type 1 Diabetes Mellitus who presented to [facility] on 3/28/2025 with clotted fistula found upon arriving at scheduled dialysis appointment this morning. Was sent to the emergency room from his appointment. Patient states his last dialysis appointment was on Wednesday and was a routine appointment. HD was completed then, no difficulties. States he was recently in inpatient rehab ([place]) per his PCP to work on strength in his legs. Endorses a history of difficulty urinating and currently makes little urine. Denies fever, chills, chest pain, shortness of breath, abdominal pain.    In the ED, patient presented HDS and AF. BUN 39, Cr 5.96, BUN/Cr ratio 6.5. Vascular surgery was consulted for declotting procedure, no OR availability today and will likely perform the procedure early next week. IR consulted for temporary dialysis catheter placement for inpatient dialysis today. Hospital service to admit for continued care.     Review of Systems:  Constitutional: No fevers, chills, sweats  Eye: No recent visual problems, eye discharge, eye pain, redness  HEENT: No ear pain, nasal congestion, sore throat, voice changes  Respiratory: No shortness of breath, cough, pain on breathing, sputum production  Cardiovascular: No Chest pain, palpitations, syncope, orthopnea  Gastrointestinal: No nausea, vomiting, diarrhea, constipation  Genitourinary: Difficulty urinating. No hematuria, dysuria, incontinence  Musculoskeletal: No back pain, neck pain, joint pain, muscle pain, decreased range of  CC:  Vin L Peace is here today for Follow-up (Epistaxis) and Office Visit    Medications: medications verified and updated  Added preferred pharmacy  Denies  known Latex allergy or symptoms of Latex sensitivity.  All allergies and medications reviewed.  Patient would like communication of their results via:      Home Phone: [phone] (home)  Okay to leave a message containing results? Yes      Rooming documentation of social history includes tobacco screening only.   motion  Integumentary: No rash, pruritus, abrasions, lesions  Neurologic: No weakness, numbness, frequent headaches, tremors  Psychiatric: No anxiety, depression, mood changes, hallucinations        Personal History     Past Medical History:   Diagnosis Date    Acute kidney failure, unspecified     B12 deficiency     BPH (benign prostatic hyperplasia)     Cancer 01/2020    PANCREAS AND KIDNEY    Diabetes mellitus     INSULIN DEPENDANT    End stage renal disease on dialysis     3/30/2023    Glaucoma     HLD (hyperlipidemia)     Hypertension     Peripheral edema     Sleep apnea     CPAP BRING DOS    Stomach disorder     NOT EMPTYING    Vitamin D deficiency, unspecified        Past Surgical History:   Procedure Laterality Date    ARTERIOVENOUS FISTULA/SHUNT SURGERY Left 04/27/2023    Procedure: ARTERIOVENOUS GRAFT;  Surgeon: Dodie Hernandez MD;  Location: [facility] OR;  Service: Vascular;  Laterality: Left;    GALLBLADDER SURGERY  09/2023    [facility]    HERNIA REPAIR  1962    KNEE ARTHROSCOPY Left 1979    NEPHRECTOMY PARTIAL Left     10%    SPLENECTOMY      TONSILLECTOMY  1974    VITRECTOMY PARS PLANA Right 10/22/2019    Procedure: 25G VITRECTOMY-ENDOLASER;  Surgeon: Lazarus, Howard S., MD;  Location: [facility] OR;  Service: Ophthalmology    WHIPPLE PROCEDURE         Family History: family history is not on file. Otherwise pertinent FHx was reviewed and not pertinent to current issue.    Social History:  reports that he quit smoking about 6 years ago. His smoking use included cigarettes. He started smoking about 55 years ago. He has a 49 pack-year smoking history. He has been exposed to tobacco smoke. He has never used smokeless tobacco. He reports that he does not currently use alcohol. He reports current drug use. Frequency: 3.00 times per week. Drug: Marijuana.    Home Medications:  Prior to Admission Medications       Prescriptions Last Dose Informant Patient Reported? Taking?    acetaminophen (TYLENOL) 325 MG  tablet   Yes No    Take  by mouth.    amLODIPine (NORVASC) 10 MG tablet   Yes No    Take 1 tablet by mouth Daily. TAKE PREOP    B-D ULTRAFINE III SHORT PEN 31G X 8 MM misc   Yes No    calcitriol (ROCALTROL) 0.5 MCG capsule   Yes No    Take 1 capsule by mouth 3 (Three) Times a Week. Monday, Wednesday and Friday    calcium acetate (PHOS BINDER,) 667 MG capsule capsule   Yes No    Take 1 capsule by mouth 3 (Three) Times a Day.    Cholecalciferol (VITAMIN D3) 2000 units tablet   Yes No    Take 1 tablet by mouth Daily. LAST DOSE 4/20    cyanocobalamin 1000 MCG/ML injection   Yes No    Inject 1 mL into the appropriate muscle as directed by prescriber Every 30 (Thirty) Days.    doxazosin (CARDURA) 8 MG tablet   Yes No    Take 1 tablet by mouth 2 (Two) Times a Day. TAKE PREOP    famotidine (PEPCID) 20 MG tablet   Yes No    Take 1 tablet by mouth 2 (Two) Times a Day.    Ferric Citrate 1  MG(Fe) tablet   Yes No    Take 1 tablet by mouth Daily.    furosemide (LASIX) 40 MG tablet   Yes No    Take 1 tablet by mouth Daily As Needed.    hydrALAZINE (APRESOLINE) 100 MG tablet   Yes No    Take 1 tablet by mouth 3 (Three) Times a Day.    hydrOXYzine (ATARAX) 50 MG tablet   Yes No    Take 1 tablet by mouth Every 8 (Eight) Hours As Needed for Itching or Anxiety.    Insulin Glargine, 2 Unit Dial, (Toujeo Max SoloStar) 300 UNIT/ML solution pen-injector injection   No No    Inject 20 Units under the skin into the appropriate area as directed Daily for 30 days.    Patient taking differently:  Inject 50 Units under the skin into the appropriate area as directed Every Night.    Insulin Lispro (HUMALOG IJ)   Yes No    Inject 13 Units as directed Every Evening.    Insulin Lispro (humaLOG) 100 UNIT/ML injection   Yes No    Inject 9 Units under the skin into the appropriate area as directed 2 (Two) Times a Day. Bkft and lunch  none preop    ipratropium-albuterol (DUO-NEB) 0.5-2.5 mg/3 ml nebulizer   Yes No    Inhale 3 mL.    lanreotide  acetate (SOMATULINE) 60 MG/0.2ML solution injection   Yes No    Inject 0.2 mL under the skin into the appropriate area as directed Every 30 (Thirty) Days.    lidocaine (LIDODERM) 5 %   Yes No    LUMIGAN 0.01 % ophthalmic drops   Yes No    Administer 1 drop to both eyes Every Night. Instill 1 drop into both eyes daily at bedtime    metoclopramide (REGLAN) 10 MG tablet   Yes No    Take 1 tablet by mouth 4 (Four) Times a Day. Morning and lunch  TAKE PREOP    Multiple Vitamins-Minerals (GWENDOLYN MULTIVITAMIN FOR MEN) tablet   Yes No    Take 1 tablet by mouth Daily. Take one daily  LD 4/20    pancrelipase, Lip-Prot-Amyl, (Creon) 61809-12347 units capsule delayed-release particles capsule   Yes No    Take 1 capsule by mouth 3 (Three) Times a Day With Meals.    rosuvastatin (CRESTOR) 10 MG tablet   No No    Take 1 tablet by mouth Daily.    tamsulosin (FLOMAX) 0.4 MG capsule 24 hr capsule   Yes No    Take 2 capsules by mouth every night at bedtime.    Velphoro 500 MG chewable tablet   Yes No    Chew 1 tablet 3 (Three) Times a Day Before Meals. Please see attached for detailed directions    vitamin C (ASCORBIC ACID) 500 MG tablet   Yes No    Take 1 tablet by mouth Daily. LD 4/20              Allergies:  No Known Allergies    Objective      Vitals:   Temp:  [97.7 °F (36.5 °C)] 97.7 °F (36.5 °C)  Heart Rate:  [78-84] 83  Resp:  [16] 16  BP: (109-122)/(58-67) 119/67  Body mass index is 27.48 kg/m².    Physical Exam  General: 75 yo WM, Alert and oriented, well nourished, no acute distress.  HENT: Normocephalic, normal hearing, moist oral mucosa, no scleral icterus.  Neck: Supple, nontender, no carotid bruits, no JVD, no LAD.  Lungs: Clear to auscultation, nonlabored respiration.  Heart: RRR, no murmur, gallop or edema.  Abdomen: Soft, nontender, nondistended, + bowel sounds.  Musculoskeletal: Fistula palpated in LUE, no thrill palpated. Normal range of motion and strength, no tenderness or swelling.  Skin: Skin is warm, dry and  pink, no rashes or lesions.  Psychiatric: Cooperative, appropriate mood and affect.      Diagnostic Data:  Lab Results (last 24 hours)       Procedure Component Value Units Date/Time    Hepatitis B Surface Antigen [[id number]]  (Normal) Collected: 03/28/25 1019    Specimen: Blood from Arm, Right Updated: 03/28/25 1059     Hepatitis B Surface Ag Non-Reactive    Protime-INR [[id number]]  (Abnormal) Collected: 03/28/25 1019    Specimen: Blood from Arm, Right Updated: 03/28/25 1053     Protime 14.6 Seconds      INR 1.15    aPTT [[id number]]  (Normal) Collected: 03/28/25 1019    Specimen: Blood from Arm, Right Updated: 03/28/25 1053     PTT 32.8 seconds     CBC & Differential [[id number]]  (Abnormal) Collected: 03/28/25 0842    Specimen: Blood Updated: 03/28/25 0920    Narrative:      The following orders were created for panel order CBC & Differential.  Procedure                               Abnormality         Status                     ---------                               -----------         ------                     CBC Auto Differential[[id number]]        Abnormal            Final result               Scan Slide[[id number]]                                       Final result                 Please view results for these tests on the individual orders.    CBC Auto Differential [[id number]]  (Abnormal) Collected: 03/28/25 0842    Specimen: Blood Updated: 03/28/25 0920     WBC 6.10 10*3/mm3      RBC 3.14 10*6/mm3      Hemoglobin 8.9 g/dL      Hematocrit 30.4 %      MCV 96.8 fL      MCH 28.3 pg      MCHC 29.3 g/dL      RDW 17.0 %      RDW-SD 59.6 fl      MPV 11.9 fL      Platelets 121 10*3/mm3      Comment: Slide Reviewed          Neutrophil % 45.0 %      Lymphocyte % 32.0 %      Monocyte % 18.7 %      Eosinophil % 3.3 %      Basophil % 0.7 %      Immature Grans % 0.3 %      Neutrophils, Absolute 2.75 10*3/mm3      Lymphocytes, Absolute 1.95 10*3/mm3      Monocytes, Absolute 1.14 10*3/mm3      Eosinophils, Absolute 0.20  10*3/mm3      Basophils, Absolute 0.04 10*3/mm3      Immature Grans, Absolute 0.02 10*3/mm3      nRBC 0.0 /100 WBC     Scan Slide [[id number]] Collected: 03/28/25 0842    Specimen: Blood Updated: 03/28/25 0920     Crenated RBC's Mod/2+     Schistocytes Slight/1+     WBC Morphology Normal     Platelet Estimate Decreased    Basic Metabolic Panel [[id number]]  (Abnormal) Collected: 03/28/25 0842    Specimen: Blood Updated: 03/28/25 0915     Glucose 145 mg/dL      BUN 39 mg/dL      Creatinine 5.96 mg/dL      Sodium 129 mmol/L      Potassium 4.2 mmol/L      Chloride 94 mmol/L      CO2 21.8 mmol/L      Calcium 9.1 mg/dL      BUN/Creatinine Ratio 6.5     Anion Gap 13.2 mmol/L      eGFR 9.3 mL/min/1.73     Narrative:      GFR Categories in Chronic Kidney Disease (CKD)      GFR Category          GFR (mL/min/1.73)    Interpretation  G1                     90 or greater         Normal or high (1)  G2                      60-89                Mild decrease (1)  G3a                   45-59                Mild to moderate decrease  G3b                   30-44                Moderate to severe decrease  G4                    15-29                Severe decrease  G5                    14 or less           Kidney failure          (1)In the absence of evidence of kidney disease, neither GFR category G1 or G2 fulfill the criteria for CKD.    eGFR calculation 2021 CKD-EPI creatinine equation, which does not include race as a factor    Magnesium [[id number]]  (Normal) Collected: 03/28/25 0842    Specimen: Blood Updated: 03/28/25 0915     Magnesium 1.8 mg/dL              Imaging Results (Last 24 Hours)       ** No results found for the last 24 hours. **              Assessment & Plan        This is a 74 y.o. male with:    Active and Resolved Problems  Active Hospital Problems    Diagnosis  POA    **Complication associated with dialysis catheter [T82.9XXA]  Yes      Resolved Hospital Problems   No resolved problems to display.        Complication associated with dialysis catheter  Thrombosis of arteriovenous dialysis fistula   A-V fistula  ESRD on dialysis   Vascular surgery to do declotting procedure when OR available, likely next week   Patients nephrologist request patient to be admitted for inpatient dialysis  IR to put temporary catheter in today for inpatient dialysis  HD schedule: M,W,F  Continue torsemide, furosemide  NPO in preparation for IR procedure today  Nephrology following  Vascular surgery following    Type 1 Diabetes Mellitus  Patient uses insulin pump, is not connected at this time  Initiate Glucommander protocol  Consistent carb diet, once IR procedure complete  Hypoglycemic protocol     Hyperlipidemia  Hypertension  Continue statin, losartan, amlodipine    Primary insomnia - continue Doxepin  BPH - continue Doxazosin  GERD - continue Reglan, Pepcid  Anxiety - continue Hydroxyzine     VTE Prophylaxis:  Mechanical VTE prophylaxis orders are present.        The patient desires to be as follows:    CODE STATUS:    Code Status (Patient has no pulse and is not breathing): CPR (Attempt to Resuscitate)  Medical Interventions (Patient has pulse or is breathing): Full Support        Ying Torres, who can be contacted at [phone], is the designated person to make medical decisions on the patient's behalf if He is incapable of doing so. This was clarified with patient and/or next of kin on 3/28/2025 during the course of this H&P.    Admission Status:  I believe this patient meets inpatient status.    Expected Length of Stay: 2-3 days    PDMP and Medication Dispenses via Sidebar reviewed and consistent with patient reported medications.    I discussed the patient's findings and my recommendations with patient.      Signature:     This document has been electronically signed by Yasmeen Storey PA-C on March 28, 2025 11:17 EDT   [facility] Hospitalist Team

## 2025-03-28 NOTE — CASE MANAGEMENT/SOCIAL WORK
Case Management/Social Work    Patient Name:  Gerry Torres  YOB: 1950  MRN: [id number]  Admit Date:  3/28/2025        1450 Went to patient's room to speak with patient and wife.  Pt and wife voiced they wanted to go home after HD and do not want to go back to [facility].  Informed providers Jame PAVON, Braxton BENJAMIN, in a secure chat.    Providers in agreement that the patient could go home after HD today.  Informed wife the patient needed to be at [facility] at 8:30am on Monday morning 3/31/25.  Informed wife patient needed to be NPO on Sunday night at midnight. Informed her to come to ED room 17 for discharge after patient.  Wife voiced understanding.  Wife wanted to know if patient would have HD while at [facility].    VM left for HD unit at [facility] but no return phone call.  Informed patient she would need to set up afternoon dialysis at [facility] where she normally takes him.  Ying-wife, voiced understanding and was going to call [facility].  Informed Michelle ORO of all information.  Pt to be discharge from ED.    Barby Newell RN    [address] [address]  Phone: [phone]  Fax: [phone]           Problem: Mobility Impaired (Adult and Pediatric)  Goal: *Acute Goals and Plan of Care (Insert Text)  Physical Therapy Goals  Updated 1/29/2018  Initiated 1/11/2018  Goals reassessed 1/18/18 and remain appropriate over next 7 days  1. Patient will move from supine to sit and sit to supine  in bed with supervision/set-up within 7 day(s). MET  2. Patient will transfer from bed to chair and chair to bed with supervision/set-up using the least restrictive device within 7 day(s). 3.  Patient will perform sit to stand with modified independence within 7 day(s). 4.  Patient will ambulate with supervision/set-up for 250 feet with the least restrictive device within 7 day(s). 5.  Patient will ascend/descend 12 stairs with 1 handrail(s) with supervision/set-up within 7 day(s). 6.  Patient will independently don and doff quick draw TLSO within 7 days. physical Therapy TREATMENT  Patient: Quita Lovelace (59 y.o. male)  Date: 1/31/2018  Diagnosis: Pneumonia  Altered mental status  Closed L1 vertebral fracture (HCC)  Back pain  Acute respiratory failure with hypoxemia (HCC)  COPD (chronic obstructive pulmonary disease) (HCC)  Lumbar spinal stenosis Pneumonia       Precautions: Back, Fall, Bed Alarm ([place] B: reads lips; smokes)  Chart, physical therapy assessment, plan of care and goals were reviewed. ASSESSMENT:  Patient in bed upon arrival and agreeable to PT. Patient performed bed mobility with SBA, brace donned with mod assist while seated EOB. Patient ambulated 180 ft without AD with CGA, continuous verbal cues for upright posterior due to altered center of gravity with little correction. Patient returned to bed at end of session. Recommend return to group home with HHPT at discharge.    Progression toward goals:  []    Improving appropriately and progressing toward goals  [x]    Improving slowly and progressing toward goals  []    Not making progress toward goals and plan of care will be adjusted PLAN:  Patient continues to benefit from skilled intervention to address the above impairments. Continue treatment per established plan of care. Discharge Recommendations:  Home Health at group home  Further Equipment Recommendations for Discharge:  none     SUBJECTIVE:   Patient stated I can walk.  Patient with flat affect throughout. OBJECTIVE DATA SUMMARY:   Critical Behavior:  Neurologic State: Alert  Orientation Level: Oriented X4  Cognition: Decreased attention/concentration, Impulsive  Safety/Judgement: Awareness of environment, Fall prevention, Home safety, Decreased awareness of need for safety  Functional Mobility Training:  Bed Mobility:     Supine to Sit: Supervision  Sit to Supine: Supervision           Transfers:  Sit to Stand: Stand-by asssistance  Stand to Sit: Stand-by asssistance                             Balance:  Sitting: Intact  Standing: Impaired; Without support  Standing - Static: Good  Standing - Dynamic : Fair  Ambulation/Gait Training:  Distance (ft): 180 Feet (ft)  Assistive Device: Brace/Splint;Gait belt  Ambulation - Level of Assistance: Contact guard assistance        Gait Abnormalities: Shuffling gait (flexed trunk)        Base of Support: Center of gravity altered     Speed/Courtney: Shuffled  Step Length: Left shortened;Right shortened          Pain:  Pain Scale 1: Adult Nonverbal Pain Scale  Pain Intensity 1: 0              Activity Tolerance:   VSS  Please refer to the flowsheet for vital signs taken during this treatment.   After treatment:   []    Patient left in no apparent distress sitting up in chair  [x]    Patient left in no apparent distress in bed  [x]    Call bell left within reach  [x]    Nursing notified  []    Caregiver present  [x]    Bed alarm activated    COMMUNICATION/COLLABORATION:   The patients plan of care was discussed with: Registered Nurse    King Emily PT   Time Calculation: 13 mins

## 2025-03-31 ENCOUNTER — HOSPITAL ENCOUNTER (OUTPATIENT)
Facility: HOSPITAL | Age: 75
Discharge: HOME | End: 2025-03-31
Attending: STUDENT IN AN ORGANIZED HEALTH CARE EDUCATION/TRAINING PROGRAM | Admitting: STUDENT IN AN ORGANIZED HEALTH CARE EDUCATION/TRAINING PROGRAM
Payer: MEDICARE

## 2025-03-31 ENCOUNTER — APPOINTMENT (OUTPATIENT)
Dept: GENERAL RADIOLOGY | Facility: HOSPITAL | Age: 75
End: 2025-03-31
Payer: MEDICARE

## 2025-03-31 ENCOUNTER — ANESTHESIA EVENT (OUTPATIENT)
Dept: PERIOP | Facility: HOSPITAL | Age: 75
End: 2025-03-31
Payer: MEDICARE

## 2025-03-31 ENCOUNTER — ANESTHESIA (OUTPATIENT)
Dept: PERIOP | Facility: HOSPITAL | Age: 75
End: 2025-03-31
Payer: MEDICARE

## 2025-03-31 VITALS
SYSTOLIC BLOOD PRESSURE: 98 MMHG | TEMPERATURE: 97.7 F | DIASTOLIC BLOOD PRESSURE: 67 MMHG | RESPIRATION RATE: 16 BRPM | HEART RATE: 76 BPM | OXYGEN SATURATION: 99 %

## 2025-03-31 DIAGNOSIS — T82.590D MALFUNCTION OF ARTERIOVENOUS DIALYSIS FISTULA, SUBSEQUENT ENCOUNTER: ICD-10-CM

## 2025-03-31 DIAGNOSIS — N18.6 END STAGE RENAL DISEASE: Primary | ICD-10-CM

## 2025-03-31 LAB
ANION GAP SERPL CALCULATED.3IONS-SCNC: 13.6 MMOL/L (ref 5–15)
BASOPHILS # BLD AUTO: 0.03 10*3/MM3 (ref 0–0.2)
BASOPHILS NFR BLD AUTO: 0.4 % (ref 0–1.5)
BUN SERPL-MCNC: 45 MG/DL (ref 8–23)
BUN/CREAT SERPL: 5.8 (ref 7–25)
CALCIUM SPEC-SCNC: 8.9 MG/DL (ref 8.6–10.5)
CHLORIDE SERPL-SCNC: 94 MMOL/L (ref 98–107)
CO2 SERPL-SCNC: 23.4 MMOL/L (ref 22–29)
CREAT SERPL-MCNC: 7.76 MG/DL (ref 0.76–1.27)
DEPRECATED RDW RBC AUTO: 49.1 FL (ref 37–54)
EGFRCR SERPLBLD CKD-EPI 2021: 6.8 ML/MIN/1.73
EOSINOPHIL # BLD AUTO: 0.06 10*3/MM3 (ref 0–0.4)
EOSINOPHIL NFR BLD AUTO: 0.9 % (ref 0.3–6.2)
ERYTHROCYTE [DISTWIDTH] IN BLOOD BY AUTOMATED COUNT: 14.8 % (ref 12.3–15.4)
GLUCOSE BLDC GLUCOMTR-MCNC: 101 MG/DL (ref 70–130)
GLUCOSE BLDC GLUCOMTR-MCNC: 174 MG/DL (ref 70–130)
GLUCOSE BLDC GLUCOMTR-MCNC: 189 MG/DL (ref 70–130)
GLUCOSE SERPL-MCNC: 199 MG/DL (ref 65–99)
HCT VFR BLD AUTO: 28 % (ref 37.5–51)
HGB BLD-MCNC: 9.1 G/DL (ref 13–17.7)
IMM GRANULOCYTES # BLD AUTO: 0.02 10*3/MM3 (ref 0–0.05)
IMM GRANULOCYTES NFR BLD AUTO: 0.3 % (ref 0–0.5)
LYMPHOCYTES # BLD AUTO: 0.95 10*3/MM3 (ref 0.7–3.1)
LYMPHOCYTES NFR BLD AUTO: 13.9 % (ref 19.6–45.3)
MCH RBC QN AUTO: 29.5 PG (ref 26.6–33)
MCHC RBC AUTO-ENTMCNC: 32.5 G/DL (ref 31.5–35.7)
MCV RBC AUTO: 90.9 FL (ref 79–97)
MONOCYTES # BLD AUTO: 0.96 10*3/MM3 (ref 0.1–0.9)
MONOCYTES NFR BLD AUTO: 14 % (ref 5–12)
NEUTROPHILS NFR BLD AUTO: 4.83 10*3/MM3 (ref 1.7–7)
NEUTROPHILS NFR BLD AUTO: 70.5 % (ref 42.7–76)
PLATELET # BLD AUTO: 117 10*3/MM3 (ref 140–450)
PMV BLD AUTO: 10.8 FL (ref 6–12)
POTASSIUM SERPL-SCNC: 3.8 MMOL/L (ref 3.5–5.2)
RBC # BLD AUTO: 3.08 10*6/MM3 (ref 4.14–5.8)
SODIUM SERPL-SCNC: 131 MMOL/L (ref 136–145)
WBC NRBC COR # BLD AUTO: 6.85 10*3/MM3 (ref 3.4–10.8)

## 2025-03-31 PROCEDURE — C1725 CATH, TRANSLUMIN NON-LASER: HCPCS | Performed by: STUDENT IN AN ORGANIZED HEALTH CARE EDUCATION/TRAINING PROGRAM

## 2025-03-31 PROCEDURE — 25510000001 IOPAMIDOL PER 1 ML: Performed by: STUDENT IN AN ORGANIZED HEALTH CARE EDUCATION/TRAINING PROGRAM

## 2025-03-31 PROCEDURE — 25010000002 HEPARIN (PORCINE) PER 1000 UNITS: Performed by: NURSE ANESTHETIST, CERTIFIED REGISTERED

## 2025-03-31 PROCEDURE — C1894 INTRO/SHEATH, NON-LASER: HCPCS | Performed by: STUDENT IN AN ORGANIZED HEALTH CARE EDUCATION/TRAINING PROGRAM

## 2025-03-31 PROCEDURE — C1769 GUIDE WIRE: HCPCS | Performed by: STUDENT IN AN ORGANIZED HEALTH CARE EDUCATION/TRAINING PROGRAM

## 2025-03-31 PROCEDURE — 25010000002 HEPARIN (PORCINE) PER 1000 UNITS: Performed by: STUDENT IN AN ORGANIZED HEALTH CARE EDUCATION/TRAINING PROGRAM

## 2025-03-31 PROCEDURE — G0257 UNSCHED DIALYSIS ESRD PT HOS: HCPCS

## 2025-03-31 PROCEDURE — 76937 US GUIDE VASCULAR ACCESS: CPT | Performed by: STUDENT IN AN ORGANIZED HEALTH CARE EDUCATION/TRAINING PROGRAM

## 2025-03-31 PROCEDURE — G0378 HOSPITAL OBSERVATION PER HR: HCPCS

## 2025-03-31 PROCEDURE — 25010000002 CEFAZOLIN PER 500 MG: Performed by: STUDENT IN AN ORGANIZED HEALTH CARE EDUCATION/TRAINING PROGRAM

## 2025-03-31 PROCEDURE — 25010000002 PROPOFOL 10 MG/ML EMULSION: Performed by: NURSE ANESTHETIST, CERTIFIED REGISTERED

## 2025-03-31 PROCEDURE — 25010000002 ALTEPLASE 2 MG RECONSTITUTED SOLUTION: Performed by: STUDENT IN AN ORGANIZED HEALTH CARE EDUCATION/TRAINING PROGRAM

## 2025-03-31 PROCEDURE — S0260 H&P FOR SURGERY: HCPCS | Performed by: STUDENT IN AN ORGANIZED HEALTH CARE EDUCATION/TRAINING PROGRAM

## 2025-03-31 PROCEDURE — C1757 CATH, THROMBECTOMY/EMBOLECT: HCPCS | Performed by: STUDENT IN AN ORGANIZED HEALTH CARE EDUCATION/TRAINING PROGRAM

## 2025-03-31 PROCEDURE — 25010000002 ONDANSETRON PER 1 MG: Performed by: NURSE ANESTHETIST, CERTIFIED REGISTERED

## 2025-03-31 PROCEDURE — 85025 COMPLETE CBC W/AUTO DIFF WBC: CPT | Performed by: STUDENT IN AN ORGANIZED HEALTH CARE EDUCATION/TRAINING PROGRAM

## 2025-03-31 PROCEDURE — 80048 BASIC METABOLIC PNL TOTAL CA: CPT | Performed by: STUDENT IN AN ORGANIZED HEALTH CARE EDUCATION/TRAINING PROGRAM

## 2025-03-31 PROCEDURE — 82948 REAGENT STRIP/BLOOD GLUCOSE: CPT

## 2025-03-31 PROCEDURE — 25810000003 SODIUM CHLORIDE 0.9 % SOLUTION: Performed by: STUDENT IN AN ORGANIZED HEALTH CARE EDUCATION/TRAINING PROGRAM

## 2025-03-31 PROCEDURE — 25010000002 LIDOCAINE 2% SOLUTION: Performed by: NURSE ANESTHETIST, CERTIFIED REGISTERED

## 2025-03-31 PROCEDURE — 36905 THRMBC/NFS DIALYSIS CIRCUIT: CPT | Performed by: STUDENT IN AN ORGANIZED HEALTH CARE EDUCATION/TRAINING PROGRAM

## 2025-03-31 RX ORDER — ATROPINE SULFATE 0.4 MG/ML
0.4 INJECTION, SOLUTION INTRAMUSCULAR; INTRAVENOUS; SUBCUTANEOUS ONCE AS NEEDED
Status: DISCONTINUED | OUTPATIENT
Start: 2025-03-31 | End: 2025-03-31

## 2025-03-31 RX ORDER — SODIUM CHLORIDE 0.9 % (FLUSH) 0.9 %
3 SYRINGE (ML) INJECTION EVERY 12 HOURS SCHEDULED
Status: DISCONTINUED | OUTPATIENT
Start: 2025-03-31 | End: 2025-03-31 | Stop reason: HOSPADM

## 2025-03-31 RX ORDER — INSULIN LISPRO 100 [IU]/ML
2-9 INJECTION, SOLUTION INTRAVENOUS; SUBCUTANEOUS
Status: DISCONTINUED | OUTPATIENT
Start: 2025-03-31 | End: 2025-03-31 | Stop reason: HOSPADM

## 2025-03-31 RX ORDER — ONDANSETRON 2 MG/ML
4 INJECTION INTRAMUSCULAR; INTRAVENOUS ONCE AS NEEDED
Status: DISCONTINUED | OUTPATIENT
Start: 2025-03-31 | End: 2025-03-31

## 2025-03-31 RX ORDER — NICOTINE POLACRILEX 4 MG
15 LOZENGE BUCCAL
Status: DISCONTINUED | OUTPATIENT
Start: 2025-03-31 | End: 2025-03-31 | Stop reason: HOSPADM

## 2025-03-31 RX ORDER — LABETALOL HYDROCHLORIDE 5 MG/ML
5 INJECTION, SOLUTION INTRAVENOUS
Status: DISCONTINUED | OUTPATIENT
Start: 2025-03-31 | End: 2025-03-31

## 2025-03-31 RX ORDER — SODIUM CHLORIDE 9 MG/ML
9 INJECTION, SOLUTION INTRAVENOUS CONTINUOUS
Status: DISCONTINUED | OUTPATIENT
Start: 2025-03-31 | End: 2025-03-31 | Stop reason: HOSPADM

## 2025-03-31 RX ORDER — HYDROMORPHONE HYDROCHLORIDE 1 MG/ML
0.25 INJECTION, SOLUTION INTRAMUSCULAR; INTRAVENOUS; SUBCUTANEOUS
Status: DISCONTINUED | OUTPATIENT
Start: 2025-03-31 | End: 2025-03-31

## 2025-03-31 RX ORDER — SODIUM CHLORIDE 9 MG/ML
9 INJECTION, SOLUTION INTRAVENOUS CONTINUOUS PRN
Status: DISCONTINUED | OUTPATIENT
Start: 2025-03-31 | End: 2025-03-31 | Stop reason: HOSPADM

## 2025-03-31 RX ORDER — HYDROXYZINE HYDROCHLORIDE 50 MG/1
50 TABLET, FILM COATED ORAL EVERY 8 HOURS PRN
Status: DISCONTINUED | OUTPATIENT
Start: 2025-03-31 | End: 2025-03-31 | Stop reason: HOSPADM

## 2025-03-31 RX ORDER — ROSUVASTATIN CALCIUM 10 MG/1
10 TABLET, COATED ORAL DAILY
Status: DISCONTINUED | OUTPATIENT
Start: 2025-03-31 | End: 2025-03-31 | Stop reason: HOSPADM

## 2025-03-31 RX ORDER — POLYETHYLENE GLYCOL 3350 17 G/17G
17 POWDER, FOR SOLUTION ORAL DAILY PRN
Status: DISCONTINUED | OUTPATIENT
Start: 2025-03-31 | End: 2025-03-31 | Stop reason: HOSPADM

## 2025-03-31 RX ORDER — OXYCODONE HYDROCHLORIDE 5 MG/1
5 TABLET ORAL EVERY 4 HOURS PRN
Refills: 0 | Status: DISCONTINUED | OUTPATIENT
Start: 2025-03-31 | End: 2025-03-31 | Stop reason: HOSPADM

## 2025-03-31 RX ORDER — METOCLOPRAMIDE 10 MG/1
10 TABLET ORAL 4 TIMES DAILY
Status: DISCONTINUED | OUTPATIENT
Start: 2025-03-31 | End: 2025-03-31 | Stop reason: HOSPADM

## 2025-03-31 RX ORDER — SUCCINYLCHOLINE/SOD CL,ISO/PF 200MG/10ML
SYRINGE (ML) INTRAVENOUS AS NEEDED
Status: DISCONTINUED | OUTPATIENT
Start: 2025-03-31 | End: 2025-03-31 | Stop reason: SURG

## 2025-03-31 RX ORDER — FAMOTIDINE 10 MG/ML
20 INJECTION, SOLUTION INTRAVENOUS ONCE
Status: COMPLETED | OUTPATIENT
Start: 2025-03-31 | End: 2025-03-31

## 2025-03-31 RX ORDER — MIDAZOLAM HYDROCHLORIDE 1 MG/ML
0.5 INJECTION, SOLUTION INTRAMUSCULAR; INTRAVENOUS
Status: DISCONTINUED | OUTPATIENT
Start: 2025-03-31 | End: 2025-03-31 | Stop reason: HOSPADM

## 2025-03-31 RX ORDER — SODIUM CHLORIDE 9 MG/ML
40 INJECTION, SOLUTION INTRAVENOUS AS NEEDED
Status: DISCONTINUED | OUTPATIENT
Start: 2025-03-31 | End: 2025-03-31 | Stop reason: HOSPADM

## 2025-03-31 RX ORDER — BUPIVACAINE HCL/0.9 % NACL/PF 0.125 %
PLASTIC BAG, INJECTION (ML) EPIDURAL AS NEEDED
Status: DISCONTINUED | OUTPATIENT
Start: 2025-03-31 | End: 2025-03-31 | Stop reason: SURG

## 2025-03-31 RX ORDER — LIDOCAINE HYDROCHLORIDE 10 MG/ML
0.5 INJECTION, SOLUTION INFILTRATION; PERINEURAL ONCE AS NEEDED
Status: DISCONTINUED | OUTPATIENT
Start: 2025-03-31 | End: 2025-03-31 | Stop reason: HOSPADM

## 2025-03-31 RX ORDER — NALOXONE HCL 0.4 MG/ML
0.2 VIAL (ML) INJECTION AS NEEDED
Status: DISCONTINUED | OUTPATIENT
Start: 2025-03-31 | End: 2025-03-31

## 2025-03-31 RX ORDER — CALCIUM ACETATE 667 MG/1
667 CAPSULE ORAL 3 TIMES DAILY
Status: DISCONTINUED | OUTPATIENT
Start: 2025-03-31 | End: 2025-03-31 | Stop reason: HOSPADM

## 2025-03-31 RX ORDER — IOPAMIDOL 510 MG/ML
100 INJECTION, SOLUTION INTRAVASCULAR
Status: COMPLETED | OUTPATIENT
Start: 2025-03-31 | End: 2025-03-31

## 2025-03-31 RX ORDER — DROPERIDOL 2.5 MG/ML
0.62 INJECTION, SOLUTION INTRAMUSCULAR; INTRAVENOUS
Status: DISCONTINUED | OUTPATIENT
Start: 2025-03-31 | End: 2025-03-31

## 2025-03-31 RX ORDER — HEPARIN SODIUM 1000 [USP'U]/ML
INJECTION, SOLUTION INTRAVENOUS; SUBCUTANEOUS AS NEEDED
Status: DISCONTINUED | OUTPATIENT
Start: 2025-03-31 | End: 2025-03-31 | Stop reason: SURG

## 2025-03-31 RX ORDER — FENTANYL CITRATE 50 UG/ML
25 INJECTION, SOLUTION INTRAMUSCULAR; INTRAVENOUS
Status: DISCONTINUED | OUTPATIENT
Start: 2025-03-31 | End: 2025-03-31

## 2025-03-31 RX ORDER — FENTANYL CITRATE 50 UG/ML
50 INJECTION, SOLUTION INTRAMUSCULAR; INTRAVENOUS ONCE AS NEEDED
Status: DISCONTINUED | OUTPATIENT
Start: 2025-03-31 | End: 2025-03-31 | Stop reason: HOSPADM

## 2025-03-31 RX ORDER — ONDANSETRON 2 MG/ML
INJECTION INTRAMUSCULAR; INTRAVENOUS AS NEEDED
Status: DISCONTINUED | OUTPATIENT
Start: 2025-03-31 | End: 2025-03-31 | Stop reason: SURG

## 2025-03-31 RX ORDER — ROCURONIUM BROMIDE 10 MG/ML
INJECTION, SOLUTION INTRAVENOUS AS NEEDED
Status: DISCONTINUED | OUTPATIENT
Start: 2025-03-31 | End: 2025-03-31 | Stop reason: SURG

## 2025-03-31 RX ORDER — HYDROCODONE BITARTRATE AND ACETAMINOPHEN 5; 325 MG/1; MG/1
1 TABLET ORAL ONCE AS NEEDED
Status: DISCONTINUED | OUTPATIENT
Start: 2025-03-31 | End: 2025-03-31

## 2025-03-31 RX ORDER — SODIUM CHLORIDE 0.9 % (FLUSH) 0.9 %
10 SYRINGE (ML) INJECTION EVERY 12 HOURS SCHEDULED
Status: DISCONTINUED | OUTPATIENT
Start: 2025-03-31 | End: 2025-03-31 | Stop reason: HOSPADM

## 2025-03-31 RX ORDER — BISACODYL 10 MG
10 SUPPOSITORY, RECTAL RECTAL DAILY PRN
Status: DISCONTINUED | OUTPATIENT
Start: 2025-03-31 | End: 2025-03-31 | Stop reason: HOSPADM

## 2025-03-31 RX ORDER — BISACODYL 5 MG/1
5 TABLET, DELAYED RELEASE ORAL DAILY PRN
Status: DISCONTINUED | OUTPATIENT
Start: 2025-03-31 | End: 2025-03-31 | Stop reason: HOSPADM

## 2025-03-31 RX ORDER — DEXTROSE MONOHYDRATE 25 G/50ML
25 INJECTION, SOLUTION INTRAVENOUS
Status: DISCONTINUED | OUTPATIENT
Start: 2025-03-31 | End: 2025-03-31 | Stop reason: HOSPADM

## 2025-03-31 RX ORDER — IBUPROFEN 600 MG/1
1 TABLET ORAL
Status: DISCONTINUED | OUTPATIENT
Start: 2025-03-31 | End: 2025-03-31 | Stop reason: HOSPADM

## 2025-03-31 RX ORDER — AMLODIPINE BESYLATE 10 MG/1
10 TABLET ORAL DAILY
Status: DISCONTINUED | OUTPATIENT
Start: 2025-03-31 | End: 2025-03-31 | Stop reason: HOSPADM

## 2025-03-31 RX ORDER — FLUMAZENIL 0.1 MG/ML
0.2 INJECTION INTRAVENOUS AS NEEDED
Status: DISCONTINUED | OUTPATIENT
Start: 2025-03-31 | End: 2025-03-31

## 2025-03-31 RX ORDER — EPHEDRINE SULFATE 50 MG/ML
INJECTION INTRAVENOUS AS NEEDED
Status: DISCONTINUED | OUTPATIENT
Start: 2025-03-31 | End: 2025-03-31 | Stop reason: SURG

## 2025-03-31 RX ORDER — PROMETHAZINE HYDROCHLORIDE 25 MG/1
25 SUPPOSITORY RECTAL ONCE AS NEEDED
Status: DISCONTINUED | OUTPATIENT
Start: 2025-03-31 | End: 2025-03-31

## 2025-03-31 RX ORDER — SODIUM CHLORIDE 0.9 % (FLUSH) 0.9 %
3-10 SYRINGE (ML) INJECTION AS NEEDED
Status: DISCONTINUED | OUTPATIENT
Start: 2025-03-31 | End: 2025-03-31 | Stop reason: HOSPADM

## 2025-03-31 RX ORDER — HYDROCODONE BITARTRATE AND ACETAMINOPHEN 7.5; 325 MG/1; MG/1
1 TABLET ORAL EVERY 4 HOURS PRN
Status: DISCONTINUED | OUTPATIENT
Start: 2025-03-31 | End: 2025-03-31

## 2025-03-31 RX ORDER — DIPHENHYDRAMINE HYDROCHLORIDE 50 MG/ML
12.5 INJECTION, SOLUTION INTRAMUSCULAR; INTRAVENOUS
Status: DISCONTINUED | OUTPATIENT
Start: 2025-03-31 | End: 2025-03-31

## 2025-03-31 RX ORDER — PROMETHAZINE HYDROCHLORIDE 25 MG/1
25 TABLET ORAL ONCE AS NEEDED
Status: DISCONTINUED | OUTPATIENT
Start: 2025-03-31 | End: 2025-03-31

## 2025-03-31 RX ORDER — FAMOTIDINE 20 MG/1
20 TABLET, FILM COATED ORAL DAILY
Status: DISCONTINUED | OUTPATIENT
Start: 2025-03-31 | End: 2025-03-31 | Stop reason: HOSPADM

## 2025-03-31 RX ORDER — HYDRALAZINE HYDROCHLORIDE 20 MG/ML
5 INJECTION INTRAMUSCULAR; INTRAVENOUS
Status: DISCONTINUED | OUTPATIENT
Start: 2025-03-31 | End: 2025-03-31

## 2025-03-31 RX ORDER — LIDOCAINE HYDROCHLORIDE 20 MG/ML
INJECTION, SOLUTION INFILTRATION; PERINEURAL AS NEEDED
Status: DISCONTINUED | OUTPATIENT
Start: 2025-03-31 | End: 2025-03-31 | Stop reason: SURG

## 2025-03-31 RX ORDER — TAMSULOSIN HYDROCHLORIDE 0.4 MG/1
0.8 CAPSULE ORAL DAILY
Status: DISCONTINUED | OUTPATIENT
Start: 2025-03-31 | End: 2025-03-31 | Stop reason: HOSPADM

## 2025-03-31 RX ORDER — PROPOFOL 10 MG/ML
VIAL (ML) INTRAVENOUS AS NEEDED
Status: DISCONTINUED | OUTPATIENT
Start: 2025-03-31 | End: 2025-03-31 | Stop reason: SURG

## 2025-03-31 RX ORDER — IPRATROPIUM BROMIDE AND ALBUTEROL SULFATE 2.5; .5 MG/3ML; MG/3ML
3 SOLUTION RESPIRATORY (INHALATION) ONCE AS NEEDED
Status: DISCONTINUED | OUTPATIENT
Start: 2025-03-31 | End: 2025-03-31

## 2025-03-31 RX ORDER — EPHEDRINE SULFATE 50 MG/ML
5 INJECTION, SOLUTION INTRAVENOUS ONCE AS NEEDED
Status: DISCONTINUED | OUTPATIENT
Start: 2025-03-31 | End: 2025-03-31

## 2025-03-31 RX ORDER — NITROGLYCERIN 0.4 MG/1
0.4 TABLET SUBLINGUAL
Status: DISCONTINUED | OUTPATIENT
Start: 2025-03-31 | End: 2025-03-31 | Stop reason: HOSPADM

## 2025-03-31 RX ORDER — AMOXICILLIN 250 MG
2 CAPSULE ORAL 2 TIMES DAILY PRN
Status: DISCONTINUED | OUTPATIENT
Start: 2025-03-31 | End: 2025-03-31 | Stop reason: HOSPADM

## 2025-03-31 RX ORDER — SODIUM CHLORIDE 0.9 % (FLUSH) 0.9 %
10 SYRINGE (ML) INJECTION AS NEEDED
Status: DISCONTINUED | OUTPATIENT
Start: 2025-03-31 | End: 2025-03-31 | Stop reason: HOSPADM

## 2025-03-31 RX ADMIN — Medication 100 MCG: at 11:13

## 2025-03-31 RX ADMIN — IOPAMIDOL 63 ML: 510 INJECTION, SOLUTION INTRAVASCULAR at 12:17

## 2025-03-31 RX ADMIN — HEPARIN SODIUM 7000 UNITS: 1000 INJECTION, SOLUTION INTRAVENOUS; SUBCUTANEOUS at 10:58

## 2025-03-31 RX ADMIN — ROCURONIUM BROMIDE 10 MG: 10 INJECTION, SOLUTION INTRAVENOUS at 10:58

## 2025-03-31 RX ADMIN — Medication 140 MG: at 10:58

## 2025-03-31 RX ADMIN — EPHEDRINE SULFATE 10 MG: 50 INJECTION INTRAVENOUS at 11:15

## 2025-03-31 RX ADMIN — CEFAZOLIN 2000 MG: 2 INJECTION, POWDER, FOR SOLUTION INTRAMUSCULAR; INTRAVENOUS at 10:39

## 2025-03-31 RX ADMIN — PROPOFOL 200 MG: 10 INJECTION, EMULSION INTRAVENOUS at 10:58

## 2025-03-31 RX ADMIN — LIDOCAINE HYDROCHLORIDE 100 MG: 20 INJECTION, SOLUTION INFILTRATION; PERINEURAL at 10:58

## 2025-03-31 RX ADMIN — SODIUM CHLORIDE 9 ML/HR: 9 INJECTION, SOLUTION INTRAVENOUS at 10:40

## 2025-03-31 RX ADMIN — SODIUM CHLORIDE: 9 INJECTION, SOLUTION INTRAVENOUS at 10:46

## 2025-03-31 RX ADMIN — FAMOTIDINE 20 MG: 10 INJECTION INTRAVENOUS at 10:41

## 2025-03-31 RX ADMIN — ONDANSETRON 4 MG: 2 INJECTION, SOLUTION INTRAMUSCULAR; INTRAVENOUS at 12:07

## 2025-03-31 NOTE — NURSING NOTE
HD WITHOUT INCIDENT OR C/O. TOLERATED WELL. REMOVED 2 L. NO MEDS ADMINISTERED. AVF NEEDLES REMOVED X 2. HEMOSTASIS ACHIEVED. STABLE POST COMPLETION OF HD.  
Received report from PACU. Pt to transport to HD before admission to 6E 667. Dr. Calvin contacted to review pt's home medications.  
Yes

## 2025-03-31 NOTE — PROGRESS NOTES
Name: Gerry Torres ADMIT: 3/31/2025   : 1950  PCP: Mary Le MD    MRN: [id number] LOS: 0 days   AGE/SEX: 74 y.o. male  ROOM:  [facility] OR/MAIN OR     Vascular Surgery Progress Note    Discussed patient's operation and postoperative plans with the patient's wife.  She states that he will not be able to get to dialysis at [facility] in time today now and does not feel he can wait until Wednesday to have dialysis again.  She is requesting hospital admission for possible dialysis.  Will place orders for admission for observation and consult nephrology.  Their normal nephrologist is Dr. Alaniz.        Josué Calvin II, MD  25  12:27 EDT  Office Number [phone]    [facility] [place] [facility]

## 2025-03-31 NOTE — PROGRESS NOTES
RENAL/KCC:    Patient presented for declot of AVF which was successful.  Plan for dialysis today via AVF.  HD orders have been entered.  Will be OK for D/C after dialysis.  Would keep TDC until sure that AVF functioning well outpatient at which time he can be sent for removal.     Jame Alaniz M.D.

## 2025-03-31 NOTE — ANESTHESIA PROCEDURE NOTES
Airway  Reason: elective    Date/Time: 3/31/2025 10:59 AM  Airway not difficult    General Information and Staff    Patient location during procedure: OR  CRNA/CAA: Mikayla Zambrano CRNA    Indications and Patient Condition  Indications for airway management: airway protection    Preoxygenated: yes  MILS not maintained throughout    Mask difficulty assessment: 0 - not attempted    Final Airway Details    Final airway type: endotracheal airway      Successful airway: ETT  Cuffed: yes   Successful intubation technique: direct laryngoscopy and RSI  Adjuncts used in placement: cricoid pressure  Endotracheal tube insertion site: oral  Blade: Ruth  Blade size: 4  ETT size (mm): 7.0  Cormack-Lehane Classification: grade I - full view of glottis  Placement verified by: chest auscultation and capnometry   Cuff volume (mL): 7  Measured from: lips  ETT/EBT  to lips (cm): 22  Number of attempts at approach: 1  Assessment: lips, teeth, and gum same as pre-op and atraumatic intubation    Additional Comments  Pt preoxygenated prior to induction, atraumatic intubation,+ ETCO2, + bs bilat,  ETT secured and connected to ventilator.

## 2025-03-31 NOTE — DISCHARGE INSTRUCTIONS
**Patient okay to attempt using graft for dialysis**    [facility]  Niraj Thomas, Claudia, Ernesto Harley,Omar, Mary  [address]  [place] [address]  [phone]        Discharge Instructions for Fistulogram/Shuntogram/Graft Thrombectomy      Go home, rest and take it easy today.       You may experience some dizziness or memory loss from the anesthesia.  This may last for the next 24 hours.  Someone should plan on staying with you for the first 24 hours for your safety.    Do not make any important legal decisions or sign any legal papers for the next 24 hours.      Eat and drink lightly today.  Start off with liquids, jello, soup, crackers or other bland foods at first. You may advance your diet tomorrow as tolerated as long as you do not experience any nausea or vomiting.    No driving for the remainder of the day.  You may resume limited driving tomorrow, if necessary.     You may resume routine medications including blood thinners. However, Glucophage should not be started until 72 hours after the dye is given.      No lifting over 10 pounds and no strenuous activity for the next 2-3 days with the involved arm.        Limit going up and down steps for 2 days.    Leave the dressing on until tomorrow morning.  You may then take this off, as well as the small see through dressing with gauze, tomorrow.  If this does not come off easily, do not pull this off.  If it is stuck, shower and let the warm water loosen it before removal.     Check your groin/ arm dressing regularly for bleeding through the dressing (under the pressure dressing).  A small amount of blood contained by the gauze is normal: the whole dressing should not be filled with blood, or leaking out under the sides.  If the bandage is saturated, you may remove it and replace it with a new dressing.      If you experience bleeding through the gauze/ clear dressing, lay flat and have someone apply direct pressure  for 15 minutes.  If bleeding has stopped after this, you may put a clean gauze and tape over the area.  Continue to lie flat for 1-2 hours.  If bleeding continues after 15 minutes of pressure, call us at [phone]. There is an MD available after hours for urgent matters.            If you experience heavy bleeding continue to hold firm pressure and call 911.    Do not call the MD on call.     If your doctor gave you a prescription for a narcotic pill (Tylenol #3, Vicodin, Hydrocodone, Oxycodone or Percocet), you may not drive a vehicle or operate machinery while taking this type of medication.     13. Severe pain is not expected after this procedure.  If you experience severe pain, please call our office at [phone].          Remember to contact our office for any of the following:    Fever > 101 degrees  Severe pain   Severe nausea or vomiting   Significant bleeding of your incisions  Drainage that has a bad smell or is yellow or green in appearance  Any other questions or concerns

## 2025-03-31 NOTE — OP NOTE
[facility]    Date of Admission:  3/31/2025  Today's Date:  03/31/25  Josué Calvin II, MD  [facility]    Preoperative Diagnosis:   Thrombosed left arm arteriovenous graft  End-stage renal disease on dialysis    Postoperative Diagnosis:   Same    Procedure Performed:   Ultrasound-guided access left arm arteriovenous graft x 2    Percutaneous pharmacomechanical thrombectomy of left arm arteriovenous graft utilizing  device with 4 mg of tPA, angioplasty balloon, over-the-wire Jazmin catheter    Balloon angioplasty venous limb with a 7 x 40 mm angioplasty balloon    Balloon angioplasty arterial limb with 6 x 40 mm angioplasty balloon    Central venogram      Surgeon:   Josué Calvin II, MD    Assistant:    Cary Jj  RN, Provided critical assistance in exposure, retraction, and suction that overall decrease blood loss and operative time.    Anesthesia:   General    Estimated Blood Loss:   50mL    Findings:    Successful percutaneous thrombectomy of left arm arteriovenous graft.   High-grade stenosis at venous anastomosis treated with 7 mm high-pressure balloon angioplasty with approximately 10% residual stenosis.  Moderate stenosis in midportion of arteriovenous graft treated with 7 mm angioplasty balloon.  Minimal residual stenosis.    Central venogram: Widely patent left axillary and subclavian veins, patent innominate vein, patent superior vena cava.  No evidence of central venous stenosis.    Implants:    Nothing was implanted during the procedure    Staff:   Circulator: Amaya Antoine RN  Scrub Person: Viviana Conde  Assistant: Cary Jj CSFA  Vascular Radiology Technician: James Feng    Specimen:   none    Complications:   None intraop    Dispo:   to PACU    Indication for procedure:   74 y.o. male with metastatic neuroendocrine tumor, and end-stage renal disease on dialysis which she gets via left arm arteriovenous graft.  Patient was seen in the ER at [facility] on Friday with  thrombosed graft.  Due to lack of OR availability at that time he had a tunneled dialysis catheter placed and plans made for outpatient thrombectomy today.  Patient tolerated dialysis well on Friday prior to discharge and has no acute complaints today.  Details of procedure including risks benefits and alternatives discussed with patient he verbalized understanding and agrees to proceed.    Description of procedure:   Patient was taken to the hybrid room placed supine on the table.  General anesthesia was initiated by the anesthesia service.  Once the patient was asleep his left arm was prepped with ChloraPrep circumferentially and he was draped in sterile fashion.  A timeout was performed.  I began procedure by evaluating the left arm AV graft with ultrasound.  It was completely thrombosed.  Under ultrasound guidance I accessed the perianastomotic segment of the arteriovenous graft with a microneedle and microwire was inserted.  Placement within the graft was confirmed with ultrasound.  We then exchanged microneedle for microsheath and a 0.035 stiff angled Glidewire was advanced through the sheath and into the left subclavian vein under fluoroscopic guidance.  We then exchanged microsheath for a 7 Croatian sheath. The patient was given 7000U heparin.  Wire was removed and then a 7 Croatian  device into the left subclavian vein under fluoroscopic guidance.  Angiography was performed through the  device to confirm intraluminal placement and that the subclavian vein was patent without thrombus.  A combination of 4 mg of tPA, heparinized saline, and contrast were infiltrated into the graft through the  device as the  was run pulling slowly from the subclavian vein back to the sheath.  The  device was removed and then we reinserted a Glidewire through the 7 Croatian sheath.  7 mm angioplasty balloon was advanced over the wire we performed balloon angioplasty of the entire venous limb of the  arteriovenous graft.  There was high-grade stenosis at the venous anastomosis.  The balloon ruptured here and had to be replaced with a high-pressure 7 mm angioplasty balloon.  We then completed balloon angioplasty of the venous anastomosis.  We then had good venous backbleeding at the level of our sheath and the venous limb of the graft looked patent on angiography.  We performed a central venogram which did not show any evidence of central venous stenosis.  I then accessed the graft in the proximal upper arm oriented towards the arterial anastomosis under ultrasound guidance with a microneedle.  Microwire was inserted and placement of the graft was confirmed with ultrasound and fluoroscopy.  We then changed microneedle for microsheath.  A 0.035 Glidewire was advanced through the micro sheath and then the microsheath was exchanged for a 6 Mexican sheath.  Kumpe catheter was used to direct the Glidewire into the brachial artery proximal to the anastomosis.  Angiography was performed through the Kumpe catheter here to confirm intraluminal placement.  We then advanced a 0.018 wire through the Kumpe catheter and it was removed.  #3 over-the-wire Jazmin catheter was then advanced over the wire and we performed Jazmin mechanical thrombectomy of the arterial anastomosis and arterial limb 3 consecutive times.  We then advanced a Kumpe catheter back over the wire and repeated angiography from the brachial artery.  This showed patent arterial limb with some questionable stenosis around the arterial anastomosis and perianastomotic graft.  Comfy was used to exchanged for a 0.035 Glidewire and then a 6 x 40 mm angioplasty balloon was obtained and we performed balloon angioplasty of the arterial anastomosis and perianastomotic segment of the graft.  Completion angiography following this showed widely patent perianastomotic graft and arterial anastomosis.  There appeared to potentially be some stenosis in the mid and distal  graft around our 6 Dutch sheath that was causing some sluggish flow through the graft.  We then reinserted the 7 mm angioplasty balloon through the sheath that was oriented towards the venous limb and performed balloon angioplasty of the midportion of the graft and the outflow of the venous limb.  Following this the graft appeared to be widely patent but there was some shaking hearing laminar thrombus in the midportion of the graft although not causing any flow-limiting stenosis.  We reinserted the  device after removing the wire and performed  rotational thrombectomy of this area again.  In doing so our 7 Dutch sheath was removed from the graft and not able to be reinserted and so this access site was closed with a 3-0 Prolene bolster stitch.  We then completed angiography through the 6 Dutch sheath which showed no residual stenosis.  Multiple oblique views were taken of the venous anastomosis to ensure no flow-limiting stenosis there and it appeared widely patent with perhaps approximately 10% stenosis.  Is excellent flow through the graft and had an excellent palpable thrill.  We then removed the 6 Dutch sheath and this access site was closed with a 3-0 Prolene bolster stitch as well.  Pressure was held until hemostasis was obtained.  The patient's arm was cleaned and then sterile dressings were placed.  The patient was then awaken from anesthesia and transported to PACU in good condition.  I discussed the outcome of the procedure with the patient's wife on the phone.  Patient tolerated the procedure well and there were no intraoperative complications  All wires, catheters, sheaths, and other devices were removed and found to be whole and intact prior to the conclusion of the procedure.       Josué Calvin II, MD  03/31/25     Active Hospital Problems    Diagnosis  POA    **ESRD (end stage renal disease) [N18.6]  Yes    Dialysis AV fistula malfunction [T82.590A]  Yes    End stage renal disease  [N18.6]  Yes      Resolved Hospital Problems   No resolved problems to display.

## 2025-03-31 NOTE — ANESTHESIA PREPROCEDURE EVALUATION
Anesthesia Evaluation     Patient summary reviewed and Nursing notes reviewed   NPO Solid Status: > 8 hours  NPO Liquid Status: > 8 hours           Airway   Mallampati: II  TM distance: >3 FB  Neck ROM: full  No difficulty expected  Dental    (+) upper dentures and partials    Pulmonary - normal exam   (+) ,sleep apnea on CPAP  Cardiovascular - normal exam    (+) hypertension, hyperlipidemia      Neuro/Psych- negative ROS  GI/Hepatic/Renal/Endo    (+) obesity, renal disease- CRI, diabetes mellitus type 1    ROS Comment: S/CP [facility] 5 years ago  Now with mets to liver.  Gastroparesis    Musculoskeletal (-) negative ROS    Abdominal  - normal exam    Bowel sounds: normal.   Substance History - negative use     OB/GYN negative ob/gyn ROS         Other                      Anesthesia Plan    ASA 4     general     intravenous induction     Anesthetic plan, risks, benefits, and alternatives have been provided, discussed and informed consent has been obtained with: patient.  Pre-procedure education provided  Plan discussed with CRNA.      CODE STATUS:

## 2025-03-31 NOTE — H&P
Name: Gerry Torres ADMIT: 3/31/2025   : 1950  PCP: Mary Le MD    MRN: [id number] LOS: 0 days   AGE/SEX: 74 y.o. male  ROOM: [facility] Main OR/MAIN OR     No chief complaint on file.      Subjective   Patient is a 74 y.o. male presents for left arm arteriovenous graft thrombectomy. He presented to [facility] on Driday with a thrombosed graft and had a TDC placed by IR. He was able to get dialysis Friday and discharge with plans for outpatient thrombectomy today. He feels well with no acute complaints.   He has made arrangements for dialysis later today after thrombectomy.       History of Present Illness    Past Medical History:   Diagnosis Date    Acute kidney failure, unspecified     B12 deficiency     BPH (benign prostatic hyperplasia)     Cancer 2020    PANCREAS AND KIDNEY    Diabetes mellitus     INSULIN DEPENDANT    End stage renal disease on dialysis     3/30/2023    Glaucoma     HLD (hyperlipidemia)     Hypertension     Peripheral edema     Sleep apnea     CPAP BRING DOS    Stomach disorder     NOT EMPTYING    Vitamin D deficiency, unspecified      Past Surgical History:   Procedure Laterality Date    ARTERIOVENOUS FISTULA/SHUNT SURGERY Left 2023    Procedure: ARTERIOVENOUS GRAFT;  Surgeon: Dodie Hernandez MD;  Location: [facility] OR;  Service: Vascular;  Laterality: Left;    GALLBLADDER SURGERY  2023    [facility]    HERNIA REPAIR      KNEE ARTHROSCOPY Left     NEPHRECTOMY PARTIAL Left     10%    SPLENECTOMY      TONSILLECTOMY  1974    VITRECTOMY PARS PLANA Right 10/22/2019    Procedure: 25G VITRECTOMY-ENDOLASER;  Surgeon: Lazarus, Howard S., MD;  Location: [facility];  Service: Ophthalmology    WHIPPLE PROCEDURE       History reviewed. No pertinent family history.  Social History     Tobacco Use    Smoking status: Former     Current packs/day: 0.00     Average packs/day: 1 pack/day for 49.0 years (49.0 ttl pk-yrs)     Types: Cigarettes     Start  date: 1969     Quit date: 2018     Years since quittin.3     Passive exposure: Past    Smokeless tobacco: Never   Vaping Use    Vaping status: Never Used   Substance Use Topics    Alcohol use: Not Currently     Comment: ocassional    Drug use: Yes     Frequency: 3.0 times per week     Types: Marijuana     Comment: occasional gummy  LAST DOSE      Medications Prior to Admission   Medication Sig Dispense Refill Last Dose/Taking    acetaminophen (TYLENOL) 325 MG tablet Take  by mouth.   Past Month    amLODIPine (NORVASC) 10 MG tablet Take 1 tablet by mouth Daily. TAKE PREOP   3/30/2025 Morning    B-D ULTRAFINE III SHORT PEN 31G X 8 MM misc    3/30/2025 Morning    Cholecalciferol (VITAMIN D3) 2000 units tablet Take 1 tablet by mouth Daily. LAST DOSE 4/20   3/30/2025 Morning    cyanocobalamin 1000 MCG/ML injection Inject 1 mL into the appropriate muscle as directed by prescriber Every 30 (Thirty) Days.   Past Month    calcitriol (ROCALTROL) 0.5 MCG capsule Take 1 capsule by mouth 3 (Three) Times a Week. Monday, Wednesday and Friday   3/26/2025    calcium acetate (PHOS BINDER,) 667 MG capsule capsule Take 1 capsule by mouth 3 (Three) Times a Day.       famotidine (PEPCID) 20 MG tablet Take 1 tablet by mouth 2 (Two) Times a Day.   Unknown    Ferric Citrate 1  MG(Fe) tablet Take 1 tablet by mouth Daily.   Unknown    furosemide (LASIX) 40 MG tablet Take 1 tablet by mouth Daily As Needed.       hydrALAZINE (APRESOLINE) 100 MG tablet Take 1 tablet by mouth 3 (Three) Times a Day.       hydrOXYzine (ATARAX) 50 MG tablet Take 1 tablet by mouth Every 8 (Eight) Hours As Needed for Itching or Anxiety.       Insulin Glargine, 2 Unit Dial, (Toujeo Max SoloStar) 300 UNIT/ML solution pen-injector injection Inject 20 Units under the skin into the appropriate area as directed Daily for 30 days. 2 mL 0     Insulin Lispro (HUMALOG IJ) Inject 13 Units as directed Every Evening.       Insulin Lispro (humaLOG) 100 UNIT/ML  injection Inject 9 Units under the skin into the appropriate area as directed 2 (Two) Times a Day. Bkft and lunch  none preop       ipratropium-albuterol (DUO-NEB) 0.5-2.5 mg/3 ml nebulizer Inhale 3 mL.       lanreotide acetate (SOMATULINE) 60 MG/0.2ML solution injection Inject 0.2 mL under the skin into the appropriate area as directed Every 30 (Thirty) Days.       lidocaine (LIDODERM) 5 %        LUMIGAN 0.01 % ophthalmic drops Administer 1 drop to both eyes Every Night. Instill 1 drop into both eyes daily at bedtime  2     metoclopramide (REGLAN) 10 MG tablet Take 1 tablet by mouth 4 (Four) Times a Day. Morning and lunch  TAKE PREOP       Multiple Vitamins-Minerals (GWENDOLYN MULTIVITAMIN FOR MEN) tablet Take 1 tablet by mouth Daily. Take one daily  LD 4/20       pancrelipase, Lip-Prot-Amyl, (Creon) 38522-38184 units capsule delayed-release particles capsule Take 1 capsule by mouth 3 (Three) Times a Day With Meals.       rosuvastatin (CRESTOR) 10 MG tablet Take 1 tablet by mouth Daily. 90 tablet 3     tamsulosin (FLOMAX) 0.4 MG capsule 24 hr capsule Take 2 capsules by mouth every night at bedtime.       Velphoro 500 MG chewable tablet Chew 1 tablet 3 (Three) Times a Day Before Meals. Please see attached for detailed directions       vitamin C (ASCORBIC ACID) 500 MG tablet Take 1 tablet by mouth Daily. LD 4/20        Allergies:  Patient has no known allergies.    Review of Systems     Objective    Vital Signs        on   ;      There is no height or weight on file to calculate BMI.    Physical Exam  R TDC in place   L arm AV graft with no thrill.       Results Review:   I reviewed the patient's new clinical results.  Imaging Results (Last 24 Hours)       ** No results found for the last 24 hours. **            Assessment & Plan       Dialysis AV fistula malfunction    End stage renal disease      Assessment & Plan    74-year-old man here for a left arm arteriovenous graft thrombectomy.  Details of this procedure  including risks benefits and alternatives discussed with the patient and he verbalized understanding and agrees to proceed.  Cefazolin for perioperative antibiotics.    I discussed the patients findings and my recommendations with patient and nursing staff.          Josué Calvin II, MD  [facility]  [phone]  03/31/25  09:22 EDT

## 2025-03-31 NOTE — ANESTHESIA POSTPROCEDURE EVALUATION
Patient: Gerry Torres    Procedure Summary       Date: 03/31/25 Room / Location: [facility] OR 18 INV / [facility] HYBRID OR; [facility] XRAY OR    Anesthesia Start: 1046 Anesthesia Stop: 1225    Procedures:       ARTERIOGRAM (AUTOFINALIZE)      ARTERIOVENOUS FISTULA THROMBECTOMY (Left)      FISTULOGRAM (Left) Diagnosis:       Malfunction of arteriovenous dialysis fistula, subsequent encounter      End stage renal disease      (l fistula)      (Malfunction of arteriovenous dialysis fistula, subsequent encounter [T82.590D])      (End stage renal disease [N18.6])    Scheduled Providers: Josué Calvin II, MD Provider: Mellisa Ellington MD    Anesthesia Type: MAC ASA Status: 4            Anesthesia Type: MAC    Vitals  Vitals Value Taken Time   /66 03/31/25 13:45   Temp 36.4 °C (97.6 °F) 03/31/25 12:22   Pulse 76 03/31/25 13:49   Resp 14 03/31/25 13:15   SpO2 96 % 03/31/25 13:49   Vitals shown include unfiled device data.        Post Anesthesia Care and Evaluation    Patient location during evaluation: PACU  Patient participation: complete - patient participated  Level of consciousness: awake  Pain management: adequate    Airway patency: patent  Anesthetic complications: No anesthetic complications    Cardiovascular status: acceptable  Respiratory status: acceptable  Hydration status: acceptable    Comments: /57 (BP Location: Right arm, Patient Position: Lying)   Pulse 75   Temp 36.6 °C (97.8 °F) (Temporal)   Resp 14   SpO2 94%

## 2025-04-01 NOTE — CASE MANAGEMENT/SOCIAL WORK
Case Management Discharge Note      Final Note: Per RN staff pt discharged home on 3/31.   BARBARA Otero RN         Selected Continued Care - Discharged on 3/31/2025 Admission date: 3/31/2025 - Discharge disposition: Home or Self Care      Destination    No services have been selected for the patient.                Durable Medical Equipment    No services have been selected for the patient.                Dialysis/Infusion    No services have been selected for the patient.                Home Medical Care    No services have been selected for the patient.                Therapy    No services have been selected for the patient.                Community Resources    No services have been selected for the patient.                Community & DME    No services have been selected for the patient.                    Transportation Services  Private: Car    Final Discharge Disposition Code: 01 - home or self-care

## 2025-08-05 ENCOUNTER — TELEPHONE (OUTPATIENT)
Dept: CARDIOLOGY | Facility: CLINIC | Age: 75
End: 2025-08-05
Payer: MEDICARE

## (undated) DEVICE — DRAPE,HAND,STERILE: Brand: MEDLINE

## (undated) DEVICE — SPNG LAP PREWSH SFTPK 18X18IN STRL PK/5

## (undated) DEVICE — PK AV FISTL/SHNT 40

## (undated) DEVICE — SHLD EYE ALUM W/GARTER

## (undated) DEVICE — RADIFOCUS GLIDEWIRE: Brand: GLIDEWIRE

## (undated) DEVICE — NDL HYPO PRECISIONGLIDE/REG 18G 1IN PNK

## (undated) DEVICE — SUT PROLN SURGILENE 5.0 24IN BLU 9702H

## (undated) DEVICE — GLV SURG BIOGEL LTX PF 7 1/2

## (undated) DEVICE — ANTIBACTERIAL UNDYED BRAIDED (POLYGLACTIN 910), SYNTHETIC ABSORBABLE SUTURE: Brand: COATED VICRYL

## (undated) DEVICE — PAD,EYE,1-5/8X2 5/8,STERILE,LF,1/PK: Brand: MEDLINE

## (undated) DEVICE — DRAPE,REIN 53X77,STERILE: Brand: MEDLINE

## (undated) DEVICE — PINNACLE R/O II INTRODUCER SHEATH WITH RADIOPAQUE MARKER: Brand: PINNACLE

## (undated) DEVICE — SYR LUERLOK 30CC

## (undated) DEVICE — SYR LL TP 10ML STRL

## (undated) DEVICE — COVER,MAYO STAND,STERILE: Brand: MEDLINE

## (undated) DEVICE — MICRO TIP WIPE: Brand: DEVON

## (undated) DEVICE — INFLATION DEVICE: Brand: ENCORE™ 26

## (undated) DEVICE — PK ANGIO 40

## (undated) DEVICE — PENCL SMOKE/EVAC MEGADYNE TELESCP 10FT

## (undated) DEVICE — SUT SILK 2/0 TIES 18IN A185H

## (undated) DEVICE — SYR LUERLOK 5CC

## (undated) DEVICE — SUT PROLN 6/0 BV1 D/A 30IN 8709H

## (undated) DEVICE — ST ACC MICROPUNCTURE STFF .018 ECHO/PLAT/TP 4F/10CM 21G/7CM

## (undated) DEVICE — SOLUTION,WATER,IRRIGATION,1000ML,STERILE: Brand: MEDLINE

## (undated) DEVICE — GLV SURG BIOGEL LTX PF 7

## (undated) DEVICE — PK VITRECT 50

## (undated) DEVICE — SUT SILK 3/0 TIES 18IN A184H

## (undated) DEVICE — Device

## (undated) DEVICE — CLEANER15™ ROTATIONAL THROMBECTOMY SYSTEM 7F X 65CM: Brand: CLEANER15™ ROTATIONAL THROMBECTOMY SYSTEM

## (undated) DEVICE — GLV SURG SENSICARE PI LF PF 7.5 GRN STRL

## (undated) DEVICE — NDL HYPO PRECISIONGLIDE REG 25G 1 1/2

## (undated) DEVICE — SUT SILK 3/0 SH CR8 18IN C013D

## (undated) DEVICE — UNDRGLV SURG BIOGEL PIMICROINDICATOR SYNTH SZ6.5PF STRL PR/2

## (undated) DEVICE — PK VITRECT EVA VGPC 25G

## (undated) DEVICE — PATIENT RETURN ELECTRODE, SINGLE-USE, CONTACT QUALITY MONITORING, ADULT, WITH 9FT CORD, FOR PATIENTS WEIGING OVER 33LBS. (15KG): Brand: MEGADYNE

## (undated) DEVICE — ARMADA 35 PTA CATHETER 7 MM X 40 MM X 80 CM / OVER-THE-WIRE: Brand: ARMADA

## (undated) DEVICE — LP VESL MAXI 2.5X1MM RED 2PK

## (undated) DEVICE — ADHS SKIN PREMIERPRO EXOFIN TOPICAL HI/VISC .5ML

## (undated) DEVICE — SOL NS 500ML

## (undated) DEVICE — 3F 80CM OVER-THE-WIRE EMBOLECTOMY CATHETER: Brand: LEMAITRE EMBOLECTOMY CATHETER

## (undated) DEVICE — SOL BSS SYS PLS GLS 500ML

## (undated) DEVICE — 3M™ IOBAN™ 2 ANTIMICROBIAL INCISE DRAPE 6650EZ: Brand: IOBAN™ 2

## (undated) DEVICE — BANDAGE,GAUZE,BULKEE II,4.5"X4.1YD,STRL: Brand: MEDLINE

## (undated) DEVICE — TRAP FLD MINIVAC MEGADYNE 100ML

## (undated) DEVICE — PENCL HND ROCKRSWTCH HOLSTR EZ CLEAN TP CRD 10FT

## (undated) DEVICE — CONQUEST® PTA BALLOON DILATATION CATHETER 7 MM X 40 MM, 75 CM CATHETER: Brand: CONQUEST®

## (undated) DEVICE — 1 ML TUBERCULIN SYRINGE REGULAR TIP: Brand: MONOJECT

## (undated) DEVICE — CVR PROB 96IN LF STRL

## (undated) DEVICE — SOL IRRIG H2O 1000ML STRL

## (undated) DEVICE — SOL IRRIG NACL 1000ML

## (undated) DEVICE — CANN RETINA SFT TP 25G

## (undated) DEVICE — SYR LUERLOK 20CC BX/50

## (undated) DEVICE — PCH INST SURG INVISISHIELD 2PCKT

## (undated) DEVICE — VESSEL LOOPS X-RAY DETECTABLE: Brand: DEROYAL

## (undated) DEVICE — SUT PROLN 5/0 C1 D/A 36IN 8720H

## (undated) DEVICE — PTA BALLOON DILATATION CATHETER: Brand: MUSTANG™

## (undated) DEVICE — RADIFOCUS GLIDEWIRE ADVANTAGE GUIDEWIRE: Brand: GLIDEWIRE ADVANTAGE

## (undated) DEVICE — PROB DIR LASR 25G STRL

## (undated) DEVICE — KT SURG TURNOVER 050

## (undated) DEVICE — SOL NACL 0.9PCT 1000ML

## (undated) DEVICE — PK MINOR VASC 50

## (undated) DEVICE — APPL CHLORAPREP HI/LITE TINTED 10.5ML ORNG

## (undated) DEVICE — CATH ANGIO BEACON TP/KMP 5F 65CM .038IN